# Patient Record
Sex: FEMALE | Race: WHITE
[De-identification: names, ages, dates, MRNs, and addresses within clinical notes are randomized per-mention and may not be internally consistent; named-entity substitution may affect disease eponyms.]

---

## 2017-01-17 ENCOUNTER — RX ONLY (RX ONLY)
Age: 52
End: 2017-01-17

## 2017-01-17 ENCOUNTER — DOCTOR'S OFFICE (OUTPATIENT)
Dept: URBAN - METROPOLITAN AREA CLINIC 136 | Facility: CLINIC | Age: 52
Setting detail: OPHTHALMOLOGY
End: 2017-01-17
Payer: COMMERCIAL

## 2017-01-17 DIAGNOSIS — H52.4: ICD-10-CM

## 2017-01-17 PROCEDURE — 92014 COMPRE OPH EXAM EST PT 1/>: CPT | Performed by: OPTOMETRIST

## 2017-01-17 ASSESSMENT — REFRACTION_OUTSIDERX
OS_CYLINDER: -0.50
OD_VA3: 20/
OS_SPHERE: PLANO
OD_SPHERE: +2.00
OD_CYLINDER: -0.50
OS_SPHERE: +2.00
OD_VA1: 20/20
OS_VA2: 20/
OS_VA3: PRA: -0.50
OD_AXIS: 180
OD_SPHERE: PLANO
OD_VA2: 20/
OD_VA2: 20/20 OU
OD_VA3: NRA: +0.50
OD_AXIS: 180
OU_VA: 20/20
OS_VA1: 20/
OS_VA1: 20/20
OU_VA: 20/
OD_VA1: 20/
OS_CYLINDER: -0.50
OD_CYLINDER: -0.50
OS_AXIS: 160
OS_ADD: +2.00
OD_ADD: +2.00
OS_VA3: 20/
OS_AXIS: 160
OS_VA2: 20/

## 2017-01-17 ASSESSMENT — AXIALLENGTH_DERIVED
OS_AL: 23.37
OD_AL: 23.1885

## 2017-01-17 ASSESSMENT — VISUAL ACUITY
OS_BCVA: 20/20-1
OD_BCVA: 20/20

## 2017-01-17 ASSESSMENT — REFRACTION_CURRENTRX
OS_OVR_VA: 20/
OS_CYLINDER: SPHERE
OS_OVR_VA: 20/
OD_OVR_VA: 20/
OD_OVR_VA: 20/
OD_VPRISM_DIRECTION: SV
OS_VPRISM_DIRECTION: SV
OD_SPHERE: +1.75
OS_SPHERE: +1.75
OD_CYLINDER: SPHERE
OS_OVR_VA: 20/
OD_OVR_VA: 20/

## 2017-01-17 ASSESSMENT — REFRACTION_MANIFEST
OD_VA2: 20/
OS_VA1: 20/
OS_VA3: 20/
OS_VA2: 20/
OU_VA: 20/
OD_VA3: 20/
OD_VA1: 20/

## 2017-01-17 ASSESSMENT — KERATOMETRY
METHOD_AUTO_MANUAL: AUTO
OD_K2POWER_DIOPTERS: 45.50
OS_K2POWER_DIOPTERS: 45.00
OD_K1POWER_DIOPTERS: 44.00
OS_K1POWER_DIOPTERS: 43.75

## 2017-01-17 ASSESSMENT — CONFRONTATIONAL VISUAL FIELD TEST (CVF)
OS_FINDINGS: FULL
OD_FINDINGS: FULL

## 2017-01-17 ASSESSMENT — TEAR BREAK UP TIME (TBUT)
OS_TBUT: 4S
OD_TBUT: 5S

## 2017-01-17 ASSESSMENT — SPHEQUIV_DERIVED
OS_SPHEQUIV: -0.25
OD_SPHEQUIV: -0.125

## 2017-01-17 ASSESSMENT — DECREASING TEAR LAKE - SEVERITY SCORE
OS_DEC_TEARLAKE: T
OD_DEC_TEARLAKE: T

## 2017-01-17 ASSESSMENT — SUPERFICIAL PUNCTATE KERATITIS (SPK)
OD_SPK: ABSENT
OS_SPK: ABSENT

## 2017-01-17 ASSESSMENT — REFRACTION_AUTOREFRACTION
OD_AXIS: 003
OS_CYLINDER: -1.00
OD_SPHERE: +0.25
OS_SPHERE: +0.25
OD_CYLINDER: -0.75
OS_AXIS: 164

## 2017-03-29 ENCOUNTER — GENERIC CONVERSION - ENCOUNTER (OUTPATIENT)
Dept: OTHER | Facility: OTHER | Age: 52
End: 2017-03-29

## 2017-04-03 ENCOUNTER — RX ONLY (RX ONLY)
Age: 52
End: 2017-04-03

## 2017-04-03 ENCOUNTER — DOCTOR'S OFFICE (OUTPATIENT)
Dept: URBAN - METROPOLITAN AREA CLINIC 136 | Facility: CLINIC | Age: 52
Setting detail: OPHTHALMOLOGY
End: 2017-04-03
Payer: COMMERCIAL

## 2017-04-03 DIAGNOSIS — H40.033: ICD-10-CM

## 2017-04-03 DIAGNOSIS — H04.123: ICD-10-CM

## 2017-04-03 DIAGNOSIS — H10.503: ICD-10-CM

## 2017-04-03 DIAGNOSIS — H52.4: ICD-10-CM

## 2017-04-03 PROCEDURE — 92014 COMPRE OPH EXAM EST PT 1/>: CPT | Performed by: OPHTHALMOLOGY

## 2017-04-03 PROCEDURE — 92133 CPTRZD OPH DX IMG PST SGM ON: CPT | Performed by: OPHTHALMOLOGY

## 2017-04-03 ASSESSMENT — REFRACTION_CURRENTRX
OS_OVR_VA: 20/
OD_VPRISM_DIRECTION: SV
OD_CYLINDER: SPHERE
OD_OVR_VA: 20/
OD_SPHERE: +1.75
OD_OVR_VA: 20/
OS_SPHERE: +1.75
OS_CYLINDER: SPHERE
OS_VPRISM_DIRECTION: SV
OS_OVR_VA: 20/
OS_OVR_VA: 20/
OD_OVR_VA: 20/

## 2017-04-03 ASSESSMENT — LID EXAM ASSESSMENTS
OD_BLEPHARITIS: 1+
OS_BLEPHARITIS: 1+

## 2017-04-03 ASSESSMENT — REFRACTION_OUTSIDERX
OS_CYLINDER: -0.50
OS_CYLINDER: -0.50
OU_VA: 20/20
OS_ADD: +2.00
OD_CYLINDER: -0.50
OD_AXIS: 180
OD_VA1: 20/20
OD_ADD: +2.00
OS_VA2: 20/
OS_AXIS: 160
OS_VA1: 20/20
OD_AXIS: 180
OD_CYLINDER: -0.50
OS_VA3: 20/
OS_VA1: 20/
OS_VA2: 20/
OS_SPHERE: +2.00
OD_VA2: 20/
OD_VA1: 20/
OD_SPHERE: PLANO
OD_VA3: NRA: +0.50
OD_SPHERE: +2.00
OU_VA: 20/
OD_VA2: 20/20 OU
OS_VA3: PRA: -0.50
OD_VA3: 20/
OS_SPHERE: PLANO
OS_AXIS: 160

## 2017-04-03 ASSESSMENT — TEAR BREAK UP TIME (TBUT)
OD_TBUT: 5S
OS_TBUT: 4S

## 2017-04-03 ASSESSMENT — VISUAL ACUITY
OS_BCVA: 20/20
OD_BCVA: 20/20

## 2017-04-03 ASSESSMENT — DECREASING TEAR LAKE - SEVERITY SCORE
OS_DEC_TEARLAKE: T
OD_DEC_TEARLAKE: T

## 2017-04-03 ASSESSMENT — REFRACTION_MANIFEST
OU_VA: 20/
OS_VA2: 20/
OS_VA3: 20/
OD_VA3: 20/
OD_VA2: 20/
OD_VA1: 20/
OS_VA1: 20/

## 2017-04-03 ASSESSMENT — SPHEQUIV_DERIVED
OD_SPHEQUIV: -0.125
OS_SPHEQUIV: -0.25

## 2017-04-03 ASSESSMENT — CONFRONTATIONAL VISUAL FIELD TEST (CVF)
OS_FINDINGS: FULL
OD_FINDINGS: FULL

## 2017-04-03 ASSESSMENT — SUPERFICIAL PUNCTATE KERATITIS (SPK)
OD_SPK: ABSENT
OS_SPK: ABSENT

## 2017-04-03 ASSESSMENT — REFRACTION_AUTOREFRACTION
OS_CYLINDER: -1.00
OD_CYLINDER: -0.75
OS_AXIS: 164
OS_SPHERE: +0.25
OD_SPHERE: +0.25
OD_AXIS: 003

## 2017-04-26 ENCOUNTER — AMBUL SURGICAL CARE (OUTPATIENT)
Dept: URBAN - METROPOLITAN AREA SURGERY 32 | Facility: SURGERY | Age: 52
Setting detail: OPHTHALMOLOGY
End: 2017-04-26
Payer: COMMERCIAL

## 2017-04-26 DIAGNOSIS — H40.031: ICD-10-CM

## 2017-04-26 PROCEDURE — 66761 REVISION OF IRIS: CPT | Performed by: OPHTHALMOLOGY

## 2017-04-26 PROCEDURE — G8918 PT W/O PREOP ORDER IV AB PRO: HCPCS | Performed by: OPHTHALMOLOGY

## 2017-04-26 PROCEDURE — G8907 PT DOC NO EVENTS ON DISCHARG: HCPCS | Performed by: OPHTHALMOLOGY

## 2017-04-26 ASSESSMENT — REFRACTION_OUTSIDERX
OS_VA2: 20/
OD_ADD: +2.00
OS_SPHERE: +2.00
OS_AXIS: 160
OD_VA2: 20/20 OU
OD_SPHERE: +2.00
OS_VA3: 20/
OS_VA3: PRA: -0.50
OD_VA2: 20/
OS_VA1: 20/
OS_ADD: +2.00
OD_CYLINDER: -0.50
OD_VA3: NRA: +0.50
OS_VA2: 20/
OS_SPHERE: PLANO
OS_VA1: 20/20
OU_VA: 20/
OS_CYLINDER: -0.50
OS_CYLINDER: -0.50
OD_VA3: 20/
OD_AXIS: 180
OD_SPHERE: PLANO
OU_VA: 20/20
OD_VA1: 20/
OS_AXIS: 160
OD_VA1: 20/20
OD_AXIS: 180
OD_CYLINDER: -0.50

## 2017-04-26 ASSESSMENT — REFRACTION_MANIFEST
OD_VA3: 20/
OU_VA: 20/
OS_VA3: 20/
OD_VA2: 20/
OS_VA2: 20/
OD_VA1: 20/
OS_VA1: 20/

## 2017-04-26 ASSESSMENT — REFRACTION_AUTOREFRACTION
OS_CYLINDER: -1.00
OD_SPHERE: +0.25
OS_AXIS: 164
OD_CYLINDER: -0.75
OS_SPHERE: +0.25
OD_AXIS: 003

## 2017-04-26 ASSESSMENT — REFRACTION_CURRENTRX
OD_VPRISM_DIRECTION: SV
OD_SPHERE: +1.75
OD_OVR_VA: 20/
OD_CYLINDER: SPHERE
OS_OVR_VA: 20/
OD_OVR_VA: 20/
OS_CYLINDER: SPHERE
OS_SPHERE: +1.75
OS_OVR_VA: 20/
OS_OVR_VA: 20/
OS_VPRISM_DIRECTION: SV
OD_OVR_VA: 20/

## 2017-04-26 ASSESSMENT — SUPERFICIAL PUNCTATE KERATITIS (SPK)
OD_SPK: ABSENT
OS_SPK: ABSENT

## 2017-04-26 ASSESSMENT — DECREASING TEAR LAKE - SEVERITY SCORE
OD_DEC_TEARLAKE: T
OS_DEC_TEARLAKE: T

## 2017-04-26 ASSESSMENT — TEAR BREAK UP TIME (TBUT)
OD_TBUT: 5S
OS_TBUT: 4S

## 2017-04-26 ASSESSMENT — VISUAL ACUITY
OS_BCVA: 20/20
OD_BCVA: 20/20

## 2017-04-26 ASSESSMENT — SPHEQUIV_DERIVED
OD_SPHEQUIV: -0.125
OS_SPHEQUIV: -0.25

## 2017-05-10 ENCOUNTER — AMBUL SURGICAL CARE (OUTPATIENT)
Dept: URBAN - METROPOLITAN AREA SURGERY 32 | Facility: SURGERY | Age: 52
Setting detail: OPHTHALMOLOGY
End: 2017-05-10
Payer: COMMERCIAL

## 2017-05-10 DIAGNOSIS — H40.032: ICD-10-CM

## 2017-05-10 PROCEDURE — G8918 PT W/O PREOP ORDER IV AB PRO: HCPCS | Performed by: OPHTHALMOLOGY

## 2017-05-10 PROCEDURE — 66761 REVISION OF IRIS: CPT | Performed by: OPHTHALMOLOGY

## 2017-05-10 PROCEDURE — G8907 PT DOC NO EVENTS ON DISCHARG: HCPCS | Performed by: OPHTHALMOLOGY

## 2017-05-10 ASSESSMENT — REFRACTION_OUTSIDERX
OD_VA1: 20/20
OS_SPHERE: +2.00
OS_VA2: 20/
OS_VA2: 20/
OS_VA1: 20/
OD_VA2: 20/
OS_CYLINDER: -0.50
OS_VA3: 20/
OD_VA3: 20/
OD_SPHERE: +2.00
OU_VA: 20/
OS_VA3: PRA: -0.50
OS_AXIS: 160
OS_SPHERE: PLANO
OS_ADD: +2.00
OD_ADD: +2.00
OD_AXIS: 180
OU_VA: 20/20
OD_CYLINDER: -0.50
OD_SPHERE: PLANO
OS_CYLINDER: -0.50
OD_VA2: 20/20 OU
OD_CYLINDER: -0.50
OD_VA3: NRA: +0.50
OD_AXIS: 180
OD_VA1: 20/
OS_VA1: 20/20
OS_AXIS: 160

## 2017-05-10 ASSESSMENT — REFRACTION_MANIFEST
OU_VA: 20/
OD_VA2: 20/
OD_VA1: 20/
OD_VA3: 20/
OS_VA1: 20/
OS_VA2: 20/
OS_VA3: 20/

## 2017-05-10 ASSESSMENT — REFRACTION_AUTOREFRACTION
OS_CYLINDER: -1.00
OS_AXIS: 164
OD_SPHERE: +0.25
OD_AXIS: 003
OS_SPHERE: +0.25
OD_CYLINDER: -0.75

## 2017-05-10 ASSESSMENT — REFRACTION_CURRENTRX
OD_SPHERE: +1.75
OD_OVR_VA: 20/
OD_CYLINDER: SPHERE
OS_SPHERE: +1.75
OS_OVR_VA: 20/
OD_VPRISM_DIRECTION: SV
OS_VPRISM_DIRECTION: SV
OS_OVR_VA: 20/
OS_CYLINDER: SPHERE
OD_OVR_VA: 20/
OS_OVR_VA: 20/
OD_OVR_VA: 20/

## 2017-05-10 ASSESSMENT — VISUAL ACUITY
OS_BCVA: 20/20
OD_BCVA: 20/20

## 2017-05-10 ASSESSMENT — TEAR BREAK UP TIME (TBUT)
OD_TBUT: 5S
OS_TBUT: 4S

## 2017-05-10 ASSESSMENT — DECREASING TEAR LAKE - SEVERITY SCORE
OS_DEC_TEARLAKE: T
OD_DEC_TEARLAKE: T

## 2017-05-10 ASSESSMENT — SPHEQUIV_DERIVED
OD_SPHEQUIV: -0.125
OS_SPHEQUIV: -0.25

## 2017-05-10 ASSESSMENT — SUPERFICIAL PUNCTATE KERATITIS (SPK)
OD_SPK: ABSENT
OS_SPK: ABSENT

## 2017-12-12 ENCOUNTER — ALLSCRIPTS OFFICE VISIT (OUTPATIENT)
Dept: OTHER | Facility: OTHER | Age: 52
End: 2017-12-12

## 2017-12-13 ENCOUNTER — TRANSCRIBE ORDERS (OUTPATIENT)
Dept: SLEEP CENTER | Facility: CLINIC | Age: 52
End: 2017-12-13

## 2017-12-13 DIAGNOSIS — E66.01 MORBID OBESITY (HCC): Primary | ICD-10-CM

## 2017-12-14 DIAGNOSIS — E66.01 MORBID (SEVERE) OBESITY DUE TO EXCESS CALORIES (HCC): ICD-10-CM

## 2017-12-14 DIAGNOSIS — J45.909 UNCOMPLICATED ASTHMA: ICD-10-CM

## 2017-12-14 DIAGNOSIS — R63.5 ABNORMAL WEIGHT GAIN: ICD-10-CM

## 2017-12-14 DIAGNOSIS — E78.2 MIXED HYPERLIPIDEMIA: ICD-10-CM

## 2017-12-14 DIAGNOSIS — R73.03 PREDIABETES: ICD-10-CM

## 2017-12-14 DIAGNOSIS — K21.9 GASTRO-ESOPHAGEAL REFLUX DISEASE WITHOUT ESOPHAGITIS: ICD-10-CM

## 2017-12-15 ENCOUNTER — GENERIC CONVERSION - ENCOUNTER (OUTPATIENT)
Dept: OTHER | Facility: OTHER | Age: 52
End: 2017-12-15

## 2018-01-03 ENCOUNTER — GENERIC CONVERSION - ENCOUNTER (OUTPATIENT)
Dept: OTHER | Facility: OTHER | Age: 53
End: 2018-01-03

## 2018-01-17 ENCOUNTER — DOCTOR'S OFFICE (OUTPATIENT)
Dept: URBAN - METROPOLITAN AREA CLINIC 136 | Facility: CLINIC | Age: 53
Setting detail: OPHTHALMOLOGY
End: 2018-01-17
Payer: COMMERCIAL

## 2018-01-17 DIAGNOSIS — H52.4: ICD-10-CM

## 2018-01-17 PROCEDURE — 92014 COMPRE OPH EXAM EST PT 1/>: CPT | Performed by: OPTOMETRIST

## 2018-01-17 ASSESSMENT — REFRACTION_MANIFEST
OD_VA1: 20/
OS_VA2: 20/
OS_VA1: 20/
OS_VA3: 20/
OD_VA2: 20/
OU_VA: 20/
OD_VA3: 20/

## 2018-01-17 ASSESSMENT — CONFRONTATIONAL VISUAL FIELD TEST (CVF)
OD_FINDINGS: FULL
OS_FINDINGS: FULL

## 2018-01-17 ASSESSMENT — REFRACTION_AUTOREFRACTION
OS_CYLINDER: -1.00
OS_SPHERE: +0.25
OS_AXIS: 165
OD_CYLINDER: -0.75
OD_AXIS: 012
OD_SPHERE: PLANO

## 2018-01-17 ASSESSMENT — REFRACTION_OUTSIDERX
OD_SPHERE: +2.00
OD_ADD: +2.25
OD_CYLINDER: -0.50
OD_VA2: 20/20 OU
OS_VA3: 20/
OS_SPHERE: +2.25
OS_CYLINDER: -0.75
OS_AXIS: 160
OS_VA1: 20/20
OS_SPHERE: +0.25
OS_AXIS: 160
OD_VA1: 20/
OD_CYLINDER: -0.50
OS_VA2: 20/
OD_VA3: NRA: +0.50
OS_VA3: PRA: -0.50
OS_ADD: +2.25
OS_VA2: 20/
OS_CYLINDER: -0.75
OD_VA2: 20/
OD_AXIS: 010
OU_VA: 20/
OS_VA1: 20/
OD_AXIS: 010
OD_SPHERE: PLANO
OU_VA: 20/20
OD_VA3: 20/
OD_VA1: 20/20

## 2018-01-17 ASSESSMENT — SUPERFICIAL PUNCTATE KERATITIS (SPK)
OD_SPK: ABSENT
OS_SPK: ABSENT

## 2018-01-17 ASSESSMENT — KERATOMETRY
OS_K1POWER_DIOPTERS: 43.75
METHOD_AUTO_MANUAL: AUTO
OS_K2POWER_DIOPTERS: 45.00
OD_K2POWER_DIOPTERS: 45.50
OD_K1POWER_DIOPTERS: 44.00

## 2018-01-17 ASSESSMENT — REFRACTION_CURRENTRX
OS_VPRISM_DIRECTION: SV
OD_SPHERE: +1.75
OD_OVR_VA: 20/
OD_VPRISM_DIRECTION: SV
OS_OVR_VA: 20/
OS_OVR_VA: 20/
OS_SPHERE: +1.75
OD_OVR_VA: 20/
OS_CYLINDER: SPHERE
OD_CYLINDER: SPHERE
OD_OVR_VA: 20/
OS_OVR_VA: 20/

## 2018-01-17 ASSESSMENT — SPHEQUIV_DERIVED: OS_SPHEQUIV: -0.25

## 2018-01-17 ASSESSMENT — DECREASING TEAR LAKE - SEVERITY SCORE
OS_DEC_TEARLAKE: T
OD_DEC_TEARLAKE: T

## 2018-01-17 ASSESSMENT — LID EXAM ASSESSMENTS
OD_BLEPHARITIS: 1+
OS_BLEPHARITIS: 1+

## 2018-01-17 ASSESSMENT — AXIALLENGTH_DERIVED: OS_AL: 23.37

## 2018-01-17 ASSESSMENT — TEAR BREAK UP TIME (TBUT)
OS_TBUT: 4S
OD_TBUT: 5S

## 2018-01-17 ASSESSMENT — VISUAL ACUITY
OD_BCVA: 20/25+2
OS_BCVA: 20/20-1

## 2018-01-23 VITALS
TEMPERATURE: 96.8 F | SYSTOLIC BLOOD PRESSURE: 116 MMHG | DIASTOLIC BLOOD PRESSURE: 84 MMHG | BODY MASS INDEX: 37.19 KG/M2 | RESPIRATION RATE: 16 BRPM | WEIGHT: 202.1 LBS | HEIGHT: 62 IN | HEART RATE: 92 BPM

## 2018-01-23 NOTE — RESULT NOTES
Message  Reviewed notes from GI  Patient had endoscopy in October of 2016 which was positive for H pylori  Stool for H pylori negative in March of 2017  She does have a history of Banks's esophagus without dysplasia  And his CT scan of the abdomen did show fatty liver

## 2018-01-23 NOTE — MISCELLANEOUS
Message  Spoke with the patient  She saw her PCP and had labs done yesterday  She was diagnosed with diabetes, hyperlipidemia, and hypertension and was started on medication for all three  She requested that the lab results be copy to us and will request the last office note be sent to our office  Past sent to insurance coordinators see if she has a candidate for bariatric surgery        Signatures   Electronically signed by : LUZ Hdez; Hernan  3 2018  3:51PM EST                       (Author)

## 2018-01-23 NOTE — MISCELLANEOUS
Message  Reviewed labs from March  A1c was 6 4%  We will recheck with a CMP and TSH  Her cholesterol was also elevated  She will follow up with her PCP for further evaluation and management  She is currently interested in bariatric surgery, however with out primary diagnosis she does not qualify  For now she will schedule an appointment for menu planning with the dietitian and follow in the conservative program  She was referred for sleep medicine consult as well  Plan  Abnormal weight gain, Hyperlipidemia, mixed, Prediabetes    · (1) TSH WITH FT4 REFLEX; Status:Active; Requested for:[id number];   Acid reflux, Asthma, Hyperlipidemia, mixed, Prediabetes, Severe obesity (BMI 35 0-39  9)    · (1) COMPREHENSIVE METABOLIC PANEL; Status:Active; Requested for:[id number];    · (1) HEMOGLOBIN A1C; Status:Active;  Requested for:[id number];     Signatures   Electronically signed by : Kim Leal, [facility]; Dec 14 2017  9:56AM EST                       (Author)

## 2018-01-23 NOTE — CONSULTS
Chief Complaint  Chief Complaint Free Text Note Form: New patient here for MWM consult; Stop [place] Garber   History of Present Illness  Free Text HPI: Obesity-  Severity: Severe  Onset: 15 years ago  Modifiers: follows a healthy diet  frequent steroid use due to asthma  Tried [employer], [employer], [place] [employer], paleo diet, and vegan diet and was unable to lose weight  Associated Symptoms: worsening asthma, GERD, fatigue  Goals: become more active, improve comorbid conditions, feel better/more energized, weigh 135 lbs    Drinks 8-9 glasses of water  Drinks 2-3 cups of black coffee  B: two eggs + 1 piece whole grain bread  L: salad and small chili  S: occasional fruit  D: protein + salad +starch  S: fruit  Past Medical History    1  No pertinent past medical history  Active Problems And Past Medical History Reviewed: The active problems and past medical history were reviewed and updated today  Assessment    1  Severe obesity (BMI 35 0-39 9) (278 01) (E66 01)   2  Asthma (493 90) (J45 909)   3  Acid reflux (530 81) (K21 9)   4  Prediabetes (790 29) (R73 03)    Plan   1  *1 -  [facility] Co-Management  *+STOPBAN/8  Status: Hold For -   Scheduling  Requested for: [id number]  Care Summary provided  : Yes    Discussion/Summary  Discussion Summary:   46 yo female w/pre diabetes, asthma, GERD, +STOP BANG, and severe obesity here to pursue medical weight management to improve weight and health  Obesity class 2:  -discussed options of conservative vs ANA MARIA program +/- meal replacement vs VLCD  -discussed the role of weight loss medication  -initial focus of 5-10% weight loss over 3-6 mos for improved health  -screening labs:  Will request from patient's PCP    Prediabetes:  -will request lab results from PCP    Asthma: Severe  -on daily prednisone, singular, and Ventolin  -continue management with pulmonary    GERD:  -?Banks's: will request notes studies from GI  -not well controlled  -taking omeprazole    Osteopenia:  -referred to rheumatology by PCP    +STOPBANG:  -referral to sleep medicine provided    Per patient, her PCP and specialist have recommended surgery  Reviewed indications for surgery  For now, she is interested in Healthy Core or VLCD  She will review her options and call to schedule  Goals and Barriers: The patient has the current Goals:     Goals:  1  Food log www  [employer] <[email or web address]>  Patient's Capacity to Self-Care: Patient is able to Self-Care  Understands and agrees with treatment plan: The treatment plan was reviewed with the patient/guardian  The patient/guardian understands and agrees with the treatment plan   Self Referrals:   Self Referrals: No      Review of Systems  Focused-Female:   Constitutional: no fever and no chills  ENT: no sore throat  Cardiovascular: no palpitations  Respiratory: shortness of breath, cough and Managed by Pulmonary  Gastrointestinal: GERD poorly controlled, but no abdominal pain, no nausea, no vomiting and no diarrhea  Genitourinary: no dysuria  Musculoskeletal: no arthralgias  Integumentary: no rashes  The patient presents with complaints of headache (managed by PCP)  Other Symptoms: PSYCH: denies depression and anxiety  ROS Reviewed:   ROS reviewed  Surgical History    1  Denied: History of Recent Surgery  Surgical History Reviewed: The surgical history was reviewed and updated today  Family History  Mother    1  Family history of diabetes mellitus (V18 0) (Z83 3)   2  Family history of hypothyroidism (V18 19) (Z83 49)  Family History Reviewed: The family history was reviewed and updated today  Social History    · Never a smoker   · No alcohol use  Social History Reviewed: The social history was reviewed and updated today  Current Meds  Medication List Reviewed: The medication list was reviewed and updated today        Vitals  Vital Signs    Recorded: [id number] 03:16PM Temperature 96 8 F   Heart Rate 92   Respiration 16   Systolic 583   Diastolic 84   Height 5 ft 2 in   Weight 202 lb 1 6 oz   BMI Calculated 36 96   BSA Calculated 1 92   Waist Circumference 49 75   Neck Circumference 14 5     Physical Exam    Constitutional   General appearance: Abnormal   well developed and obese  Eyes No conjunctival pallor  Ears, Nose, Mouth, and Throat Oral mucosa moist    Pulmonary   Respiratory effort: Abnormal   (Appears short of breath) Respiratory rate: normal    Auscultation of lungs: Abnormal   Auscultation of the lungs revealed decreased breath sounds diffusely, diffuse wheezing, expiratory wheezing and inspiratory wheezing  Cardiovascular   Auscultation of heart: Normal rate and rhythm, normal S1 and S2, without murmurs  Examination of extremities for edema and/or varicosities: Normal   no edema  Abdomen   Abdomen: Abnormal   The abdomen was obese  Bowel sounds were normal  The abdomen was soft and nontender     Musculoskeletal   Gait and station: Normal     Psychiatric   Orientation to person, place, and time: Normal          Results/Data  STOP BANG Questionnaire [id number] 03:28PM User, Ahs     Test Name Result Flag Reference   STOP BANG Questionnaire Risk of ART High Risk     Snoring: Yes  Tired: Yes  Observed: Yes  Blood Pressure: Yes  BMI: Yes  Age: Yes  Neck Circumference: No  Gender: No   STOP BANG Questionnaire ART Total Score 6     Snoring: Yes  Tired: Yes  Observed: Yes  Blood Pressure: Yes  BMI: Yes  Age: Yes  Neck Circumference: No  Gender: No       Signatures   Electronically signed by : LUZ Stanford; Dec 12 2017  4:03PM EST                       (Author)    Electronically signed by : SIDNEY Graves ; Dec 13 2017  9:11AM EST                       (Co-author)

## 2018-03-06 ENCOUNTER — RX ONLY (RX ONLY)
Age: 53
End: 2018-03-06

## 2018-03-06 ENCOUNTER — DOCTOR'S OFFICE (OUTPATIENT)
Dept: URBAN - METROPOLITAN AREA CLINIC 136 | Facility: CLINIC | Age: 53
Setting detail: OPHTHALMOLOGY
End: 2018-03-06
Payer: COMMERCIAL

## 2018-03-06 DIAGNOSIS — H04.123: ICD-10-CM

## 2018-03-06 PROCEDURE — 99213 OFFICE O/P EST LOW 20 MIN: CPT | Performed by: OPTOMETRIST

## 2018-03-06 PROCEDURE — 83861 MICROFLUID ANALY TEARS: CPT | Performed by: OPTOMETRIST

## 2018-03-06 ASSESSMENT — REFRACTION_OUTSIDERX
OD_VA3: NRA: +0.50
OD_ADD: +2.25
OD_VA2: 20/
OS_CYLINDER: -0.75
OD_VA1: 20/
OS_ADD: +2.25
OD_CYLINDER: -0.50
OD_VA2: 20/20 OU
OD_CYLINDER: -0.50
OD_AXIS: 010
OS_SPHERE: +2.25
OD_SPHERE: +2.00
OS_CYLINDER: -0.75
OU_VA: 20/20
OD_AXIS: 010
OD_SPHERE: PLANO
OD_VA1: 20/20
OS_VA3: 20/
OS_VA3: PRA: -0.50
OS_AXIS: 160
OS_VA1: 20/20
OS_AXIS: 160
OS_VA1: 20/
OS_SPHERE: +0.25
OS_VA2: 20/
OD_VA3: 20/
OU_VA: 20/
OS_VA2: 20/

## 2018-03-06 ASSESSMENT — REFRACTION_AUTOREFRACTION
OS_CYLINDER: -0.75
OD_SPHERE: PLANO
OS_AXIS: 162
OS_SPHERE: PLANO
OD_AXIS: 017
OD_CYLINDER: -0.50

## 2018-03-06 ASSESSMENT — DECREASING TEAR LAKE - SEVERITY SCORE
OS_DEC_TEARLAKE: T
OD_DEC_TEARLAKE: T

## 2018-03-06 ASSESSMENT — SUPERFICIAL PUNCTATE KERATITIS (SPK)
OS_SPK: ABSENT
OD_SPK: ABSENT

## 2018-03-06 ASSESSMENT — REFRACTION_MANIFEST
OS_VA1: 20/
OD_VA3: 20/
OD_VA1: 20/
OS_VA3: 20/
OD_VA2: 20/
OU_VA: 20/
OS_VA2: 20/

## 2018-03-06 ASSESSMENT — CONFRONTATIONAL VISUAL FIELD TEST (CVF)
OS_FINDINGS: FULL
OD_FINDINGS: FULL

## 2018-03-06 ASSESSMENT — LID EXAM ASSESSMENTS
OD_BLEPHARITIS: 1+
OS_BLEPHARITIS: 1+

## 2018-03-06 ASSESSMENT — REFRACTION_CURRENTRX
OS_CYLINDER: SPHERE
OD_OVR_VA: 20/
OS_OVR_VA: 20/
OD_OVR_VA: 20/
OD_VPRISM_DIRECTION: SV
OD_CYLINDER: SPHERE
OS_SPHERE: +1.75
OD_SPHERE: +1.75
OD_OVR_VA: 20/
OS_OVR_VA: 20/
OS_OVR_VA: 20/
OS_VPRISM_DIRECTION: SV

## 2018-03-06 ASSESSMENT — KERATOMETRY
OS_K2POWER_DIOPTERS: 45.00
OS_K1POWER_DIOPTERS: 43.75
OD_K2POWER_DIOPTERS: 45.50
OD_K1POWER_DIOPTERS: 44.00
METHOD_AUTO_MANUAL: AUTO

## 2018-03-06 ASSESSMENT — VISUAL ACUITY
OD_BCVA: 20/25
OS_BCVA: 20/20

## 2018-03-06 ASSESSMENT — TEAR BREAK UP TIME (TBUT)
OD_TBUT: 3+ 2 SEC
OS_TBUT: 3+ 2 SEC

## 2018-06-29 ENCOUNTER — DOCTOR'S OFFICE (OUTPATIENT)
Dept: URBAN - METROPOLITAN AREA CLINIC 136 | Facility: CLINIC | Age: 53
Setting detail: OPHTHALMOLOGY
End: 2018-06-29
Payer: COMMERCIAL

## 2018-06-29 DIAGNOSIS — H04.123: ICD-10-CM

## 2018-06-29 PROBLEM — H40.033 NARROW ANGLE; BOTH EYES: Status: ACTIVE | Noted: 2017-04-03

## 2018-06-29 PROBLEM — H52.4 PRESBYOPIA: Status: ACTIVE | Noted: 2017-04-03

## 2018-06-29 PROBLEM — H10.503 BLEPHAROCONJUNCTIVITS NOS; BOTH EYES: Status: ACTIVE | Noted: 2017-04-03

## 2018-06-29 PROCEDURE — 99213 OFFICE O/P EST LOW 20 MIN: CPT | Performed by: OPTOMETRIST

## 2018-06-29 ASSESSMENT — REFRACTION_MANIFEST
OS_VA3: 20/
OD_VA1: 20/
OD_VA2: 20/
OD_VA3: 20/
OS_VA1: 20/
OS_VA2: 20/
OU_VA: 20/

## 2018-06-29 ASSESSMENT — REFRACTION_OUTSIDERX
OD_AXIS: 010
OD_VA3: NRA: +0.50
OD_CYLINDER: -0.50
OS_VA2: 20/
OD_VA1: 20/20
OS_VA2: 20/
OD_VA2: 20/
OD_SPHERE: +2.00
OD_SPHERE: PLANO
OS_VA1: 20/20
OD_ADD: +2.25
OD_VA3: 20/
OS_VA3: 20/
OS_CYLINDER: -0.75
OS_VA1: 20/
OD_CYLINDER: -0.50
OS_VA3: PRA: -0.50
OS_ADD: +2.25
OS_AXIS: 160
OS_AXIS: 160
OD_AXIS: 010
OD_VA1: 20/
OS_CYLINDER: -0.75
OU_VA: 20/20
OS_SPHERE: +2.25
OD_VA2: 20/20 OU
OS_SPHERE: +0.25
OU_VA: 20/

## 2018-06-29 ASSESSMENT — REFRACTION_AUTOREFRACTION
OD_SPHERE: PLANO
OD_AXIS: 017
OS_SPHERE: PLANO
OS_AXIS: 162
OD_CYLINDER: -0.50
OS_CYLINDER: -0.75

## 2018-06-29 ASSESSMENT — DECREASING TEAR LAKE - SEVERITY SCORE
OD_DEC_TEARLAKE: T
OS_DEC_TEARLAKE: T

## 2018-06-29 ASSESSMENT — REFRACTION_CURRENTRX
OD_SPHERE: +1.75
OS_VPRISM_DIRECTION: SV
OS_CYLINDER: SPHERE
OD_CYLINDER: SPHERE
OD_VPRISM_DIRECTION: SV
OS_SPHERE: +1.75
OD_OVR_VA: 20/
OS_OVR_VA: 20/

## 2018-06-29 ASSESSMENT — TEAR BREAK UP TIME (TBUT)
OS_TBUT: 3+ 2 SEC
OD_TBUT: 3+ 2 SEC

## 2018-06-29 ASSESSMENT — SUPERFICIAL PUNCTATE KERATITIS (SPK)
OD_SPK: ABSENT
OS_SPK: ABSENT

## 2018-06-29 ASSESSMENT — CONFRONTATIONAL VISUAL FIELD TEST (CVF)
OD_FINDINGS: FULL
OS_FINDINGS: FULL

## 2018-06-29 ASSESSMENT — LID EXAM ASSESSMENTS
OD_BLEPHARITIS: 1+
OS_BLEPHARITIS: 1+

## 2018-06-29 ASSESSMENT — VISUAL ACUITY
OS_BCVA: 20/20
OD_BCVA: 20/20-1

## 2019-08-09 ENCOUNTER — TELEPHONE (OUTPATIENT)
Dept: FAMILY MEDICINE CLINIC | Facility: CLINIC | Age: 54
End: 2019-08-09

## 2019-08-09 PROBLEM — J32.4 CHRONIC PANSINUSITIS: Status: ACTIVE | Noted: 2019-08-09

## 2019-08-09 PROBLEM — Z01.818 PREOPERATIVE TESTING: Status: ACTIVE | Noted: 2019-08-09

## 2019-08-09 PROBLEM — R51.9 SEVERE FRONTAL HEADACHES: Status: ACTIVE | Noted: 2019-08-09

## 2019-08-09 PROBLEM — Z01.812 PRE-OPERATIVE LABORATORY EXAMINATION: Status: ACTIVE | Noted: 2019-08-09

## 2019-08-09 NOTE — TELEPHONE ENCOUNTER
[facility] from Dr Maddi Hall office called  Patient is coming in to see you to est care  She was a former pt of [facility]  [facility] would like to know if you would be willing to also do a pre op clearance on her that day  They wanted to schedule her for surgery on 8/27  Please advise and have MA call [facility] back    [phone]-

## 2019-08-09 NOTE — H&P (VIEW-ONLY)
Assessment/Plan:    Based upon the history given and the current physical findings I have recommended that the patient undergo bilateral image-guided functional endoscopic sinus surgery (FESS)  All risks, benefits, alternatives and complications of the procedure have been reviewed in detail  A risk sheet was signed with the patient  The risks of the surgery include but are not limited to:  Bleeding, infection, need for further surgery or treatment, recurrence of disease, inability to complete surgery (residual disease), injury to the olfactory nerve (inability to smell, phantom smell), injury to the orbit and muscles around the eye (double vision, decreased vision, blindness, orbital abscess), injury to the intracranial contents (CSF leak, brain injury, meningitis, encephalitis, brain abscess), cheek pain or numbness, scar formation  The patient / parent understands and accepts the risks of the surgical procedure  Pre-operative testing has been ordered  Medical clearance is required  Post operative medication and instructions have been given and the medication may be filled prior to the procedure  A post operative appointment has been scheduled  If the patient / parent has any questions prior to the date of the procedure they may call the office and I will be glad to discuss any questions or concerns with them  We discussed the possible role of a DRAF procedure and if this surgery is not successful or I am unable to complete this she will need to have this procedure  In the meantime she will use the Budesonide directly in the nose in the sniff position on the left side - this may help with the headache  The current headache is severe and I will start her on oral Gabapentin - she will also have some narcotic given for after the surgery  Encounter Diagnosis     ICD-10-CM    1  Chronic pansinusitis J32 4    2  Severe frontal headaches R51               Patient ID: Kusum Tapia is a 46 y  o  female  Kirkwood Mcardle has a long history of chronic sinusitis  She underwent endoscopic sinus surgery at [address] [place] [address] approximately age 44 with Dr Nabila Stuart  She was doing well up until approximately 5 years ago when she began to develop chronic sinus infections once again  She was developing approximately 8-9 infections per year which would respond to antibiotics but the symptoms would return almost immediately  She has recently been under the care of Dr Paco Washington at [facility] over the past year so and has had multiple endoscopies completed demonstrating polyp disease as well as pus  Cultures have been obtained in the past and were noted in the chart work  Her last exam by Dr Paco Washington was on 03/11/2019 at which point he noted that after a course of oral Bactrim and budesonide rinses there was smaller polyps on the left and the right sides but there was still pus in the right side as well  He recommended a CT scan of the sinuses and stated that if the polyps were only intranasal that she could have an in office polypectomy but if there was any other findings she might need revision endoscopic sinus surgery in the operating room  It appears as though she did not follow up with him after that point - she stopped going there because her asthma worsened and she started to see her allergist (Dr Faye Whitney)  She was recently in urgent care and was given a course of Bactrim for 10 days based on the prior culture  She is not senstive to asprin  She had a CT scan of the sinuses completed on 07/26/2019 which demonstrated polyps in the maxillary sinuses bilaterally with secretions in each of the sinuses well  There is evidence of a partial ethmoidectomy with some thickening in the unopened posterior ethmoids as well as the sphenoids that demonstrated a sphenoidotomy  Her left frontal sinus is completely opacified  Films were available to review today with the patient via disc    Prior to the visit today she was taking Advil sinus, tylenol because of a severe frontal headache over the left eye  It even hurts when she lies down  She has not had a sense of smell in the past three to four months  She has felt better when she was on the steroids  The following portions of the patient's history were reviewed and updated as appropriate: allergies, current medications, past family history, past medical history, past social history, past surgical history and problem list       Past Medical History:   Diagnosis Date    Asthma     Banks's esophagus     Diabetes ([address] )     GERD (gastroesophageal reflux disease)     Hyperlipidemia     Migraine     Nasal polyposis        Past Surgical History:   Procedure Laterality Date    FUNCTIONAL ENDOSCOPIC SINUS SURGERY  2006    [address] - Dr Liz Pires       Social History     Tobacco Use    Smoking status: Never Smoker    Smokeless tobacco: Never Used   Substance Use Topics    Alcohol use:  Yes     Alcohol/week: 1 0 standard drinks     Types: 1 Glasses of wine per week     Frequency: Monthly or less     Comment: No Use per [employer]    Drug use: Never     Comment: No use       Current Outpatient Medications on File Prior to Visit   Medication Sig Dispense Refill    ADVAIR DISKUS 500-50 MCG/DOSE inhaler INL 1 PUFF PO BID  3    albuterol (2 5 mg/3 mL) 0 083 % nebulizer solution TK 3 ML BY NEBULIZATION BID PRN FOR WHEEZING  11    albuterol (ACCUNEB) 1 25 MG/3ML nebulizer solution Inhale      BREO ELLIPTA 200-25 MCG/INH inhaler INL 1 PUFF PO D  6    budesonide (PULMICORT) 0 5 mg/2 mL nebulizer solution TK 2 ML VIA NEBLUIZER BID  6    DEXILANT 60 MG capsule TK 1 C PO 30 MIN B JOSELUIS  5    dupilumab (DUPIXENT) subcutaneous injection Inject under the skin once      EPINEPHrine (EPIPEN) 0 3 mg/0 3 mL SOAJ INJECT AT THE START OF A SEVERE ALLERGY REACTION  3    fexofenadine (ALLEGRA) 180 MG tablet Take 180 mg by mouth daily      fluticasone (FLONASE) 50 mcg/act nasal spray into each nostril      methylprednisolone (MEDROL) 4 mg tablet Take 1 tablet by mouth daily      montelukast (SINGULAIR) 10 mg tablet TK 1 T PO QD  0    SPIRIVA RESPIMAT 2 5 MCG/ACT AERS inhaler INL 2 PFS PO D  5    VENTOLIN  (90 Base) MCG/ACT inhaler INL 2 PFS PO Q 4 H PRF WHZ  5    amoxicillin (AMOXIL) 875 mg tablet TK 1 T PO BID  0    amoxicillin-clavulanate (AUGMENTIN) 875-125 mg per tablet TK 1 T PO IN THE MORNING AND IN THE RUEL WF  0    atorvastatin (LIPITOR) 20 mg tablet   3    clarithromycin (BIAXIN) 250 mg tablet TK 1 T PO QD  5    metFORMIN (GLUCOPHAGE) 500 mg tablet   5    montelukast (SINGULAIR) 10 mg tablet Take by mouth      Omeprazole 20 MG TBDD Take by mouth      predniSONE 10 mg tablet   0    ranitidine (ZANTAC) 150 mg tablet TK 1 T PO HS  6     No current facility-administered medications on file prior to visit  Allergies   Allergen Reactions    Cephalexin GI Intolerance    Clindamycin     Levofloxacin Hives    Metronidazole            Review of Systems   Constitutional: Negative for activity change, appetite change, fatigue, fever and unexpected weight change  HENT: Positive for congestion, postnasal drip, rhinorrhea, sinus pressure and sinus pain  Negative for ear discharge, ear pain, hearing loss, nosebleeds, sneezing, sore throat, tinnitus, trouble swallowing and voice change  Decreased sense of smell   Eyes: Negative  Negative for photophobia, pain, itching and visual disturbance  Respiratory: Negative  Negative for cough, chest tightness and shortness of breath  Cardiovascular: Negative  Negative for chest pain  Gastrointestinal: Negative  Negative for abdominal pain  Endocrine: Negative  Musculoskeletal: Negative  Negative for gait problem, neck pain and neck stiffness  Skin: Negative  Allergic/Immunologic: Negative  Negative for environmental allergies  Neurological: Negative    Negative for dizziness, speech difficulty, light-headedness and headaches  Hematological: Negative  Negative for adenopathy  Psychiatric/Behavioral: Negative  Negative for sleep disturbance  The patient is not nervous/anxious  /72   Pulse 68   Ht 5' 8" (1 727 m)   Wt 87 1 kg (192 lb)   BMI 29 19 kg/m²       PHYSICAL  EXAMINATION    CONSTITUTION:    Appears appropriate for age  No evidence of any acute distress  Communicates normally  Voice quality is clear  Alert and oriented  HEAD/FACE:    Atraumatic, normocephalic on inspection  No scars present  Salivary glands are normal in texture and size without any asymmetry  Facial nerve function is symmetric and normal     EYES:    Extraocular muscles intact in both eyes, normal gaze bilaterally and no evidence of nystagmus  Pupils equal, round, and accommodate to light bilaterally  EARS:    External ears normal     External canals are clear and dry  Tympanic membranes intact with normal mobility, no effusion, no retraction, no perforation  Post auricular area is normal    NOSE:    External nose without deformity  Internal mucosa pink and moist     Septum with a slight deflection to the left side    Inferior nasal turbinates normal in color and size bilaterally    ORAL CAVITY:    Lips normal and healthy in appearance  Dentition normal     Gums healthy, pink and moist     Tongue appears pink and moist with no lesions  Floor of mouth pink, moist, and smooth  Submandibular ducts patent with clear saliva  Parotid ducts patent with clear saliva  Oral mucosa pink and moist     Hard palate normal in appearance without any lesions  OROPHARYNX:    Soft palate pink and moist without any lesions  Uvula midline without any lesions  Tonsils grade 1 bilaterally  Posterior pharynx pink and moist without any lesions    NECK:    Supple and symmetric  No masses noted  Trachea midline      No thyromegaly or nodules noted     LYMPH:    No palpable adenopathy in left or right neck    SKIN:    No rashes  No lesions noted  HEART:   Regular rate and rhythm    LUNGS:  Clear to auscultation bilaterally    Nasal Endoscopy (Post Surgical)    Because only the anterior portion of the turbinates and the anterior septum could be visualized, it was elected to proceed with nasal endoscopy for visualization of the posterior nasal chamber, middle meatal area, sphenoid recess and nasopharynx  Verbal consent was obtained from the patient / parent  The nasal cavity was sprayed with topical phenylephrine:lidocaine (1:1)  The bilateral nasal cavity was sprayed  After waiting an appropriate time for the medication to take effect the nasal cavity was examined using an endoscope with the following findings:    Floor of Nose:  Normally mucosalized/unobstructed    Septum: slightly deviated to the left side    Inferior Turbinate:  Normal mucosa     Middle Turbinate:  Normal mucosa     Middle Meatus and Osteomeatal Unit:  Normal mucosa without mucopus or polyp formation - some thickening of mucosa    Maxillary Sinus:  Patent without any evidence of polyp formation - there is a glob of thick mucous or pus in the right side  Ethmoid Sinus:  Patent without any evidence of mucopus or polyp formation    Frontal Recess / Sinus:  Unable to see into either frontal sinus - large polyp left side blocking area and the right side with some scarring in the area  Sphenoid Os:   No obstruction or discharge from the site    Sphenoid Sinus:  Patent without any evidence of mucopus or polyp formation    Superior Turbinate:  Normal mucosa    Eustachian Tube Orifice:  Normal mucosa without any obstruction or edema    Nasopharynx:  Normal mucosa  No evidence of adenoid tissue, mass, ulcer, mucopus    After careful evaluation of the above structures, the scope was removed from the nasal cavity  The patient tolerated the procedure well

## 2019-08-12 NOTE — TELEPHONE ENCOUNTER
Yes, I would but it might be best to move her apt up in case we come across any issues and have her go for her blood work ordered by ENT before her apt  Apt would need to be updated to include pre op and an EKG done  Thank you

## 2019-08-13 NOTE — TELEPHONE ENCOUNTER
There are limited availability  Keep appt as is and if need be ENT can put off her surgery if something comes up

## 2019-08-20 ENCOUNTER — HOSPITAL ENCOUNTER (OUTPATIENT)
Dept: CT IMAGING | Facility: HOSPITAL | Age: 54
Discharge: HOME | End: 2019-08-20
Payer: COMMERCIAL

## 2019-08-20 DIAGNOSIS — J32.4 CHRONIC PANSINUSITIS: ICD-10-CM

## 2019-08-20 PROCEDURE — 70486 CT MAXILLOFACIAL W/O DYE: CPT

## 2019-08-22 ENCOUNTER — OFFICE VISIT (OUTPATIENT)
Dept: FAMILY MEDICINE CLINIC | Facility: CLINIC | Age: 54
End: 2019-08-22
Payer: COMMERCIAL

## 2019-08-22 VITALS
SYSTOLIC BLOOD PRESSURE: 132 MMHG | HEART RATE: 72 BPM | BODY MASS INDEX: 29.55 KG/M2 | DIASTOLIC BLOOD PRESSURE: 88 MMHG | HEIGHT: 68 IN | WEIGHT: 195 LBS

## 2019-08-22 DIAGNOSIS — K20.90 BARRETT'S ESOPHAGUS WITH ESOPHAGITIS: ICD-10-CM

## 2019-08-22 DIAGNOSIS — K22.70 BARRETT'S ESOPHAGUS WITH ESOPHAGITIS: ICD-10-CM

## 2019-08-22 DIAGNOSIS — E78.2 MULTIPLE-TYPE HYPERLIPIDEMIA: ICD-10-CM

## 2019-08-22 DIAGNOSIS — R79.89 ABNORMAL CBC: ICD-10-CM

## 2019-08-22 DIAGNOSIS — J32.4 CHRONIC PANSINUSITIS: ICD-10-CM

## 2019-08-22 DIAGNOSIS — Z12.4 CERVICAL CANCER SCREENING: ICD-10-CM

## 2019-08-22 DIAGNOSIS — R73.9 HYPERGLYCEMIA: ICD-10-CM

## 2019-08-22 DIAGNOSIS — K21.9 GASTROESOPHAGEAL REFLUX DISEASE, ESOPHAGITIS PRESENCE NOT SPECIFIED: ICD-10-CM

## 2019-08-22 DIAGNOSIS — E55.9 VITAMIN D DEFICIENCY: ICD-10-CM

## 2019-08-22 DIAGNOSIS — J45.909 SEVERE ASTHMA, UNSPECIFIED WHETHER COMPLICATED, UNSPECIFIED WHETHER PERSISTENT: ICD-10-CM

## 2019-08-22 DIAGNOSIS — Z12.11 COLON CANCER SCREENING: Primary | ICD-10-CM

## 2019-08-22 DIAGNOSIS — Z12.31 ENCOUNTER FOR SCREENING MAMMOGRAM FOR BREAST CANCER: ICD-10-CM

## 2019-08-22 DIAGNOSIS — Z01.818 PRE-OP EXAMINATION: ICD-10-CM

## 2019-08-22 PROBLEM — Z79.52 LONG TERM (CURRENT) USE OF SYSTEMIC STEROIDS: Status: ACTIVE | Noted: 2017-06-15

## 2019-08-22 PROBLEM — B00.9 HERPES SIMPLEX TYPE 1 INFECTION: Status: ACTIVE | Noted: 2018-03-12

## 2019-08-22 PROBLEM — K76.0 FATTY INFILTRATION OF LIVER: Status: ACTIVE | Noted: 2017-12-22

## 2019-08-22 PROBLEM — E66.9 OBESITY, UNSPECIFIED OBESITY SEVERITY, UNSPECIFIED OBESITY TYPE: Status: ACTIVE | Noted: 2017-12-12

## 2019-08-22 PROBLEM — Z78.9 STATIN INTOLERANCE: Status: ACTIVE | Noted: 2019-06-13

## 2019-08-22 PROBLEM — E11.9 TYPE 2 DIABETES MELLITUS WITHOUT COMPLICATION, WITHOUT LONG-TERM CURRENT USE OF INSULIN (HCC): Status: ACTIVE | Noted: 2018-08-20

## 2019-08-22 PROCEDURE — 99205 OFFICE O/P NEW HI 60 MIN: CPT | Performed by: PHYSICIAN ASSISTANT

## 2019-08-22 PROCEDURE — 3008F BODY MASS INDEX DOCD: CPT | Performed by: PHYSICIAN ASSISTANT

## 2019-08-22 PROCEDURE — 1036F TOBACCO NON-USER: CPT | Performed by: PHYSICIAN ASSISTANT

## 2019-08-22 PROCEDURE — 93000 ELECTROCARDIOGRAM COMPLETE: CPT | Performed by: PHYSICIAN ASSISTANT

## 2019-08-22 NOTE — ASSESSMENT & PLAN NOTE
EKG done today  Blood work ordered by Dr Sherly Lara  to be done orders reprinted  Addendum 8/27/19: Labs reviewed and acceptable  Pt is cleared for sinus surgery  normal...

## 2019-08-22 NOTE — PATIENT INSTRUCTIONS
Problem List Items Addressed This Visit        Digestive    Gastroesophageal reflux disease     Patient has seen GI and will have EGD yearly  Continue PPI  Banks's esophagus with esophagitis     Following with GI an EGD up-to-date  Respiratory    Chronic pansinusitis     Currently being worked up and treated by ENT  Surgery set for next month  Asthma     Patient is on daily prednisone for asthma being followed by a pulmonologist             Other    Pre-op examination      EKG done today  Blood work ordered by Dr Gerson [place]  to be done orders reprinted  Multiple-type hyperlipidemia     LDL was 156 in June  Patient has had bad experiences with myalgia with past statin use  Recheck in September  Relevant Orders    Lipid Panel with Direct LDL reflex    Vitamin D deficiency     Last vitamin-D was 16  Patient was told to take over-the-counter supplements which she is not doing so we will check this level again with next blood work  Relevant Orders    Vitamin D 25 hydroxy    Hyperglycemia     Check labs in September  Last A1c was 6 2 within normal fasting sugar  Patient has had a highest A1c of 6 5 in the past and was given metformin but did not take  Most likely elevations and sugar are due to the fact that patient does take daily prednisone/steroid due to asthma  Relevant Orders    Comprehensive metabolic panel    Hemoglobin A1C    Abnormal CBC     Patient is [facility] and [place]  H&H and RBC are slightly elevated and most likely secondary to thalassemia minor  Check repeat CBC and protein electrophoresis with next labs           Relevant Orders    CBC and differential    Protein electrophoresis, serum      Other Visit Diagnoses     Colon cancer screening    -  Primary    Encounter for screening mammogram for breast cancer        Relevant Orders    Mammo screening bilateral w 3d & cad    Cervical cancer screening        Relevant Orders    Ambulatory referral to Obstetrics / Gynecology

## 2019-08-22 NOTE — PROGRESS NOTES
Assessment and Plan:    Problem List Items Addressed This Visit        Digestive    Gastroesophageal reflux disease     Patient has seen GI and will have EGD yearly  Continue PPI  Banks's esophagus with esophagitis     Following with GI an EGD up-to-date  Respiratory    Chronic pansinusitis     Currently being worked up and treated by ENT  Surgery set for next month  Asthma     Patient is on daily prednisone for asthma being followed by a pulmonologist             Other    Pre-op examination      EKG done today  Blood work ordered by Dr Vilma Daly  to be done orders reprinted  Addendum 8/27/19: Labs reviewed and acceptable  Pt is cleared for sinus surgery  Relevant Orders    POCT ECG (Completed)    Multiple-type hyperlipidemia     LDL was 156 in June  Patient has had bad experiences with myalgia with past statin use  Recheck in September  Relevant Orders    Lipid Panel with Direct LDL reflex    Vitamin D deficiency     Last vitamin-D was 16  Patient was told to take over-the-counter supplements which she is not doing so we will check this level again with next blood work  Relevant Orders    Vitamin D 25 hydroxy    Hyperglycemia     Check labs in September  Last A1c was 6 2 within normal fasting sugar  Patient has had a highest A1c of 6 5 in the past and was given metformin but did not take  Most likely elevations and sugar are due to the fact that patient does take daily prednisone/steroid due to asthma  Relevant Orders    Comprehensive metabolic panel    Hemoglobin A1C    Abnormal CBC     Patient is [facility] and [place]  H&H and RBC are slightly elevated and most likely secondary to thalassemia minor  Check repeat CBC and protein electrophoresis with next labs           Relevant Orders    CBC and differential    Protein electrophoresis, serum      Other Visit Diagnoses     Colon cancer screening    -  Primary    Encounter for screening mammogram for breast cancer        Relevant Orders    Mammo screening bilateral w 3d & cad    Cervical cancer screening        Relevant Orders    Ambulatory referral to Obstetrics / Gynecology                 Diagnoses and all orders for this visit:    Colon cancer screening    Encounter for screening mammogram for breast cancer  -     Mammo screening bilateral w 3d & cad; Future    Cervical cancer screening  -     Ambulatory referral to Obstetrics / Gynecology; Future    Pre-op examination  -     POCT ECG    Multiple-type hyperlipidemia  -     Lipid Panel with Direct LDL reflex; Future    Vitamin D deficiency  -     Vitamin D 25 hydroxy; Future    Severe asthma, unspecified whether complicated, unspecified whether persistent    Chronic pansinusitis    Gastroesophageal reflux disease, esophagitis presence not specified    Hyperglycemia  -     Comprehensive metabolic panel; Future  -     Hemoglobin A1C; Future    Banks's esophagus with esophagitis    Abnormal CBC  -     CBC and differential; Future  -     Protein electrophoresis, serum; Future    Other orders  -     Cancel: Occult Blood, Fecal Immunochemical; Future              Subjective:      Patient ID: Jasmeet Lopez is a 46 y o  female  CC:    Chief Complaint   Patient presents with   [facility]    Pre-op Exam     Pt will be having surgery with Dr Felipe Lu on 9/3/2019  kw       HPI:    Subjective:     Patient ID: Jasmeet Lopez is a 46 y o  female  Patient presents with:  Establish Care  Pre-op Exam: Pt will be having surgery with Dr Felipe Lu on 9/3/2019  Iftikhar Butts      [unfilled]    The following portions of the patient's history were reviewed and updated as appropriate: allergies, current medications, past family history, past medical history, past social history, past surgical history and problem list     Procedure date: 9/3/19    Surgeon:  Dr Willy Lancaster, ENT    Planned procedure:  Sinus surgery for polyps and spring cleaning      Diagnosis for procedure:  Sinus polyps    Prior anesthesia: yes   Type: general     Problem with anesthesia: yes, passed out  BP went down      CAD History: no    ART history:no      [unfilled]     Current Outpatient Medications:  ADVAIR DISKUS 500-50 MCG/DOSE inhaler, INL 1 PUFF PO BID, Disp: , Rfl: 3  albuterol (2 5 mg/3 mL) 0 083 % nebulizer solution, TK 3 ML BY NEBULIZATION BID PRN FOR WHEEZING, Disp: , Rfl: 11  albuterol (ACCUNEB) 1 25 MG/3ML nebulizer solution, Inhale, Disp: , Rfl:   amoxicillin-clavulanate (AUGMENTIN) 875-125 mg per tablet, TK 1 T PO IN THE MORNING AND IN THE RUEL WF, Disp: , Rfl: 0  BREO ELLIPTA 200-25 MCG/INH inhaler, INL 1 PUFF PO D, Disp: , Rfl: 6  budesonide (PULMICORT) 0 5 mg/2 mL nebulizer solution, TK 2 ML VIA NEBLUIZER BID, Disp: , Rfl: 6  clarithromycin (BIAXIN) 250 mg tablet, TK 1 T PO QD, Disp: , Rfl: 5  DEXILANT 60 MG capsule, TK 1 C PO 30 MIN B JOSELUIS, Disp: , Rfl: 5  dupilumab (DUPIXENT) subcutaneous injection, Inject under the skin once, Disp: , Rfl:   EPINEPHrine (EPIPEN) 0 3 mg/0 3 mL SOAJ, INJECT AT THE START OF A SEVERE ALLERGY REACTION, Disp: , Rfl: 3  fexofenadine (ALLEGRA) 180 MG tablet, Take 180 mg by mouth daily, Disp: , Rfl:   fluticasone (FLONASE) 50 mcg/act nasal spray, into each nostril, Disp: , Rfl:   gabapentin (NEURONTIN) 300 mg capsule, Take 1 capsule (300 mg total) by mouth 3 (three) times a day, Disp: 60 capsule, Rfl: 0  methylprednisolone (MEDROL) 4 mg tablet, Take 1 tablet by mouth daily, Disp: , Rfl:   montelukast (SINGULAIR) 10 mg tablet, Take by mouth, Disp: , Rfl:   oxyCODONE-acetaminophen (PERCOCET) 5-325 mg per tablet, Take 1 tablet by mouth every 6 (six) hours as needed for moderate painMax Daily Amount: 4 tablets, Disp: 30 tablet, Rfl: 0  predniSONE 20 mg tablet, 3 tabs day 1-3, 2 tabs day 4-6, 1 tab day 7-9, Disp: 18 tablet, Rfl: 0  SPIRIVA RESPIMAT 2 5 MCG/ACT AERS inhaler, INL 2 PFS PO D, Disp: , Rfl: 5  VENTOLIN  (90 Base) MCG/ACT inhaler, INL 2 PFS PO Q 4 H PRF [facility], Disp: , Rfl: 5    No current facility-administered medications for this visit  Cephalexin; Clindamycin; Levofloxacin; Metronidazole; and Statins    Past Medical History:  No date: Asthma  No date: Banks's esophagus  No date: Diabetes ([address] )  No date: GERD (gastroesophageal reflux disease)  No date: Hyperlipidemia  No date: Migraine  No date: Nasal polyposis    Past Surgical History:  2006: FUNCTIONAL ENDOSCOPIC SINUS SURGERY      Comment:  [address] - Dr Heidy Crandall    Review of patient's family history indicates:  Problem: Hypothyroidism      Relation: Mother          Age of Onset: (Not Specified)  Problem: Diabetes      Relation: Mother          Age of Onset: (Not Specified)  Problem: Crohn's disease      Relation: Daughter          Age of Onset: (Not Specified)      Social History    Tobacco Use      Smoking status: Never Smoker      Smokeless tobacco: Never Used    Alcohol use: Yes      Alcohol/week: 1 0 standard drinks      Types: 1 Glasses of wine per week      Frequency: Monthly or less      Comment: No Use per [employer]    Drug use: Never      Comment: No use      Objective:    --------------------------               08/22/19                     1125        --------------------------   BP:          132/88        BP Location:    Left arm       Patient Position:    Sitting        Pulse:         72          Weight: 88 5 kg (195 lb)   Height: 5' 8" (1 727 m)   --------------------------    [unfilled]     Preop labs/testing available and reviewed: yes        Assessment/Plan:    Patient is cleared for planned procedure  Further testing/evaluation is not required  Postop concerns: no    Problem List Items Addressed This Visit        Other    Pre-op examination      EKG done today  Blood work ordered by Dr Whelan Fears  to be done orders   reprinted             Other Visit Diagnoses     Colon cancer screening    -  Primary    Encounter for screening mammogram for breast cancer        Relevant Orders    Mammo screening bilateral w 3d & cad    Cervical cancer screening        Relevant Orders    Ambulatory referral to Obstetrics / Gynecology        Diagnoses and associated orders for this visit:     Colon cancer screening     Encounter for screening mammogram for breast cancer  Mammo screening bilateral w 3d & cad; Future     Cervical cancer screening  Ambulatory referral to Obstetrics / Gynecology; Future     Pre-op examination     Other orders  Cancel: Occult Blood, Fecal Immunochemical; Future             The following portions of the patient's history were reviewed and updated as appropriate: allergies, current medications, past family history, past medical history, past social history, past surgical history and problem list       Review of Systems   Constitutional: Negative  HENT: Positive for congestion, sinus pressure and sinus pain  Eyes: Negative  Respiratory: Negative  Cardiovascular: Negative  Gastrointestinal: Negative  Endocrine: Negative  Genitourinary: Negative  Musculoskeletal: Negative  Skin: Negative  Allergic/Immunologic: Negative  Neurological: Negative  Hematological: Negative  Psychiatric/Behavioral: Negative  Data to review:       Objective:    Vitals:    08/22/19 1125   BP: 132/88   BP Location: Left arm   Patient Position: Sitting   Pulse: 72   Weight: 88 5 kg (195 lb)   Height: 5' 8" (1 727 m)        Physical Exam   Constitutional: She is oriented to person, place, and time  She appears well-developed and well-nourished  No distress  HENT:   Head: Normocephalic and atraumatic  Right Ear: Hearing, tympanic membrane, external ear and ear canal normal    Left Ear: Hearing, tympanic membrane, external ear and ear canal normal    Nose: Nose normal    Mouth/Throat: Uvula is midline, oropharynx is clear and moist and mucous membranes are normal  No oropharyngeal exudate     Eyes: Pupils are equal, round, and reactive to light  Conjunctivae, EOM and lids are normal  No scleral icterus  Neck: Normal range of motion  Carotid bruit is not present  No thyroid mass and no thyromegaly present  Cardiovascular: Regular rhythm, normal heart sounds and normal pulses  Exam reveals no gallop and no friction rub  No murmur heard  Pulmonary/Chest: Effort normal and breath sounds normal  No respiratory distress  She has no wheezes  She has no rhonchi  She has no rales  Abdominal: Soft  Normal appearance and bowel sounds are normal  She exhibits no distension, no abdominal bruit and no mass  There is no hepatosplenomegaly  There is no tenderness  There is no rebound and no guarding  No hernia  Musculoskeletal: Normal range of motion  Lymphadenopathy:     She has no cervical adenopathy  Neurological: She is alert and oriented to person, place, and time  She has normal strength and normal reflexes  She is not disoriented  No sensory deficit  She exhibits normal muscle tone  Coordination and gait normal    Skin: Skin is warm, dry and intact  She is not diaphoretic  Psychiatric: She has a normal mood and affect  Her speech is normal and behavior is normal  Judgment and thought content normal  Cognition and memory are normal    Nursing note and vitals reviewed  BMI Counseling: Body mass index is 29 65 kg/m²  Discussed the patient's BMI with her  The BMI is above average  BMI counseling and education was provided to the patient  Nutrition recommendations include reducing portion sizes

## 2019-08-22 NOTE — ASSESSMENT & PLAN NOTE
Check labs in September  Last A1c was 6 2 within normal fasting sugar  Patient has had a highest A1c of 6 5 in the past and was given metformin but did not take  Most likely elevations and sugar are due to the fact that patient does take daily prednisone/steroid due to asthma

## 2019-08-22 NOTE — ASSESSMENT & PLAN NOTE
Last vitamin-D was 16  Patient was told to take over-the-counter supplements which she is not doing so we will check this level again with next blood work

## 2019-08-22 NOTE — ASSESSMENT & PLAN NOTE
LDL was 156 in June  Patient has had bad experiences with myalgia with past statin use  Recheck in September

## 2019-08-22 NOTE — PROGRESS NOTES
Assessment and Plan:    Problem List Items Addressed This Visit        Digestive    Gastroesophageal reflux disease     Patient has seen GI and will have EGD yearly  Continue PPI  Banks's esophagus with esophagitis     Following with GI an EGD up-to-date  Respiratory    Chronic pansinusitis     Currently being worked up and treated by ENT  Surgery set for next month  Asthma     Patient is on daily prednisone for asthma being followed by a pulmonologist             Other    Pre-op examination      EKG done today  Blood work ordered by Dr Jad West  to be done orders reprinted  Multiple-type hyperlipidemia     LDL was 156 in June  Patient has had bad experiences with myalgia with past statin use  Recheck in September  Relevant Orders    Lipid Panel with Direct LDL reflex    Vitamin D deficiency     Last vitamin-D was 16  Patient was told to take over-the-counter supplements which she is not doing so we will check this level again with next blood work  Relevant Orders    Vitamin D 25 hydroxy    Hyperglycemia     Check labs in September  Last A1c was 6 2 within normal fasting sugar  Patient has had a highest A1c of 6 5 in the past and was given metformin but did not take  Most likely elevations and sugar are due to the fact that patient does take daily prednisone/steroid due to asthma  Relevant Orders    Comprehensive metabolic panel    Hemoglobin A1C    Abnormal CBC     Patient is [facility] and [place]  H&H and RBC are slightly elevated and most likely secondary to thalassemia minor  Check repeat CBC and protein electrophoresis with next labs           Relevant Orders    CBC and differential    Protein electrophoresis, serum      Other Visit Diagnoses     Colon cancer screening    -  Primary    Encounter for screening mammogram for breast cancer        Relevant Orders    Mammo screening bilateral w 3d & cad    Cervical cancer screening Relevant Orders    Ambulatory referral to Obstetrics / Gynecology                 Diagnoses and all orders for this visit:    Colon cancer screening    Encounter for screening mammogram for breast cancer  -     Mammo screening bilateral w 3d & cad; Future    Cervical cancer screening  -     Ambulatory referral to Obstetrics / Gynecology; Future    Pre-op examination  -     POCT ECG    Multiple-type hyperlipidemia  -     Lipid Panel with Direct LDL reflex; Future    Vitamin D deficiency  -     Vitamin D 25 hydroxy; Future    Severe asthma, unspecified whether complicated, unspecified whether persistent    Chronic pansinusitis    Gastroesophageal reflux disease, esophagitis presence not specified    Hyperglycemia  -     Comprehensive metabolic panel; Future  -     Hemoglobin A1C; Future    Banks's esophagus with esophagitis    Abnormal CBC  -     CBC and differential; Future  -     Protein electrophoresis, serum; Future    Other orders  -     Cancel: Occult Blood, Fecal Immunochemical; Future              Subjective:      Patient ID: Mallory Pope is a 46 y o  female  CC:    Chief Complaint   Patient presents with   [facility]    Pre-op Exam     Pt will be having surgery with Dr Sarah Beth Ibanez on 9/3/2019  kw       HPI:      Mallory Pope is here for chronic conditions f/u including the diagnosis of Colon cancer screening  (primary encounter diagnosis)  Encounter for screening mammogram for breast cancer  Cervical cancer screening  Pre-op examination  Multiple-type hyperlipidemia  Vitamin d deficiency  Severe asthma, unspecified whether complicated, unspecified whether persistent  Chronic pansinusitis  Gastroesophageal reflux disease, esophagitis presence not specified  Hyperglycemia  Banks's esophagus with esophagitis  Abnormal cbc   Pt  states they are taking all medications as directed without complaints or side effects  Patient is a new patient with multiple problems    She has severe asthma for which she sees pulmonology  She has reflux for which she sees and Gastroenterology and has been diagnosed with early Banks's and does get an EGD regularly now  She has a history of an abnormal CBC  She had because of her asthma she is on daily steroids which is causing her sugars elevate and she was diagnosed diabetes with a hemoglobin A1c of 6 5 at 1 point and given Metro 4 minutes but did not take it  Last A1c was 6 2  She is currently going for surgery with Dr Aneudy Caraballo under for severe sinus problems and polyps  She also has a history of low vitamin-D last lab in June having a level of 16 and not taking any supplementation for it  The following portions of the patient's history were reviewed and updated as appropriate: allergies, current medications, past family history, past medical history, past social history, past surgical history and problem list       Review of Systems   Constitutional: Negative  HENT: Negative  Eyes: Negative  Respiratory: Negative  Cardiovascular: Negative  Gastrointestinal: Negative  Endocrine: Negative  Genitourinary: Negative  Musculoskeletal: Negative  Skin: Negative  Allergic/Immunologic: Negative  Neurological: Negative  Hematological: Negative  Psychiatric/Behavioral: Negative  Data to review:       Objective:    Vitals:    08/22/19 1125   BP: 132/88   BP Location: Left arm   Patient Position: Sitting   Pulse: 72   Weight: 88 5 kg (195 lb)   Height: 5' 8" (1 727 m)        Physical Exam   Constitutional: She is oriented to person, place, and time  She appears well-developed and well-nourished  No distress  HENT:   Head: Normocephalic and atraumatic  Eyes: Conjunctivae are normal  Right eye exhibits no discharge  Left eye exhibits no discharge  Neck: Neck supple  Carotid bruit is not present  Cardiovascular: Normal rate, regular rhythm and normal heart sounds  Exam reveals no gallop and no friction rub     No murmur heard   Pulmonary/Chest: Effort normal and breath sounds normal  No respiratory distress  She has no wheezes  She has no rales  Neurological: She is alert and oriented to person, place, and time  Skin: Skin is warm and dry  She is not diaphoretic  Psychiatric: She has a normal mood and affect  Judgment normal    Nursing note and vitals reviewed

## 2019-08-22 NOTE — ASSESSMENT & PLAN NOTE
Patient is [facility] and [place]  H&H and RBC are slightly elevated and most likely secondary to thalassemia minor  Check repeat CBC and protein electrophoresis with next labs

## 2019-08-26 ENCOUNTER — TELEPHONE (OUTPATIENT)
Dept: FAMILY MEDICINE CLINIC | Facility: CLINIC | Age: 54
End: 2019-08-26

## 2019-08-26 ENCOUNTER — APPOINTMENT (OUTPATIENT)
Dept: LAB | Facility: CLINIC | Age: 54
End: 2019-08-26
Payer: COMMERCIAL

## 2019-08-26 DIAGNOSIS — Z01.812 PRE-OPERATIVE LABORATORY EXAMINATION: ICD-10-CM

## 2019-08-26 DIAGNOSIS — J32.4 CHRONIC PANSINUSITIS: ICD-10-CM

## 2019-08-26 DIAGNOSIS — Z01.818 PREOPERATIVE TESTING: ICD-10-CM

## 2019-08-26 DIAGNOSIS — R51.9 SEVERE FRONTAL HEADACHES: ICD-10-CM

## 2019-08-26 LAB
ANION GAP SERPL CALCULATED.3IONS-SCNC: 7 MMOL/L (ref 4–13)
BASOPHILS # BLD AUTO: 0.14 THOUSANDS/ΜL (ref 0–0.1)
BASOPHILS NFR BLD AUTO: 1 % (ref 0–1)
BUN SERPL-MCNC: 21 MG/DL (ref 5–25)
CALCIUM SERPL-MCNC: 9.7 MG/DL (ref 8.3–10.1)
CHLORIDE SERPL-SCNC: 103 MMOL/L (ref 100–108)
CO2 SERPL-SCNC: 31 MMOL/L (ref 21–32)
CREAT SERPL-MCNC: 0.58 MG/DL (ref 0.6–1.3)
EOSINOPHIL # BLD AUTO: 0.45 THOUSAND/ΜL (ref 0–0.61)
EOSINOPHIL NFR BLD AUTO: 4 % (ref 0–6)
ERYTHROCYTE [DISTWIDTH] IN BLOOD BY AUTOMATED COUNT: 13.5 % (ref 11.6–15.1)
GFR SERPL CREATININE-BSD FRML MDRD: 105 ML/MIN/1.73SQ M
GLUCOSE SERPL-MCNC: 102 MG/DL (ref 65–140)
HCT VFR BLD AUTO: 43.4 % (ref 34.8–46.1)
HGB BLD-MCNC: 13.7 G/DL (ref 11.5–15.4)
IMM GRANULOCYTES # BLD AUTO: 0.11 THOUSAND/UL (ref 0–0.2)
IMM GRANULOCYTES NFR BLD AUTO: 1 % (ref 0–2)
LYMPHOCYTES # BLD AUTO: 4.59 THOUSANDS/ΜL (ref 0.6–4.47)
LYMPHOCYTES NFR BLD AUTO: 39 % (ref 14–44)
MCH RBC QN AUTO: 29.3 PG (ref 26.8–34.3)
MCHC RBC AUTO-ENTMCNC: 31.6 G/DL (ref 31.4–37.4)
MCV RBC AUTO: 93 FL (ref 82–98)
MONOCYTES # BLD AUTO: 0.95 THOUSAND/ΜL (ref 0.17–1.22)
MONOCYTES NFR BLD AUTO: 8 % (ref 4–12)
NEUTROPHILS # BLD AUTO: 5.53 THOUSANDS/ΜL (ref 1.85–7.62)
NEUTS SEG NFR BLD AUTO: 47 % (ref 43–75)
NRBC BLD AUTO-RTO: 0 /100 WBCS
PLATELET # BLD AUTO: 347 THOUSANDS/UL (ref 149–390)
PMV BLD AUTO: 11.6 FL (ref 8.9–12.7)
POTASSIUM SERPL-SCNC: 4 MMOL/L (ref 3.5–5.3)
RBC # BLD AUTO: 4.68 MILLION/UL (ref 3.81–5.12)
SODIUM SERPL-SCNC: 141 MMOL/L (ref 136–145)
WBC # BLD AUTO: 11.77 THOUSAND/UL (ref 4.31–10.16)

## 2019-08-26 PROCEDURE — 85730 THROMBOPLASTIN TIME PARTIAL: CPT

## 2019-08-26 PROCEDURE — 85610 PROTHROMBIN TIME: CPT

## 2019-08-26 PROCEDURE — 80048 BASIC METABOLIC PNL TOTAL CA: CPT

## 2019-08-26 PROCEDURE — 85025 COMPLETE CBC W/AUTO DIFF WBC: CPT

## 2019-08-26 PROCEDURE — 36415 COLL VENOUS BLD VENIPUNCTURE: CPT

## 2019-08-27 LAB
APTT PPP: 24 SECONDS (ref 23–37)
INR PPP: 1.02 (ref 0.84–1.19)
PROTHROMBIN TIME: 13 SECONDS (ref 11.6–14.5)

## 2019-08-27 NOTE — TELEPHONE ENCOUNTER
Please let pt know her labs were acceptable and her pre op note was finished and she was cleared  She had no form to complete so we may want to send a copy of my pre op note to Dr Danish Torres?

## 2019-08-30 ENCOUNTER — ANESTHESIA EVENT (OUTPATIENT)
Dept: PERIOP | Facility: HOSPITAL | Age: 54
End: 2019-08-30
Payer: COMMERCIAL

## 2019-08-30 ENCOUNTER — HOSPITAL ENCOUNTER (OUTPATIENT)
Dept: RADIOLOGY | Facility: HOSPITAL | Age: 54
Discharge: HOME | End: 2019-08-30
Attending: OTOLARYNGOLOGY
Payer: COMMERCIAL

## 2019-08-30 DIAGNOSIS — Z01.812 PRE-OPERATIVE LABORATORY EXAMINATION: ICD-10-CM

## 2019-08-30 DIAGNOSIS — Z01.818 PREOPERATIVE TESTING: ICD-10-CM

## 2019-08-30 PROCEDURE — 71046 X-RAY EXAM CHEST 2 VIEWS: CPT

## 2019-08-30 NOTE — PRE-PROCEDURE INSTRUCTIONS
Pt instructed by anesthesia to use any inhalers she is currently using and ok for methylprednisone the morning of

## 2019-09-03 ENCOUNTER — ANESTHESIA (OUTPATIENT)
Dept: PERIOP | Facility: HOSPITAL | Age: 54
End: 2019-09-03
Payer: COMMERCIAL

## 2019-09-03 ENCOUNTER — HOSPITAL ENCOUNTER (OUTPATIENT)
Facility: HOSPITAL | Age: 54
Setting detail: OUTPATIENT SURGERY
Discharge: HOME | End: 2019-09-03
Attending: OTOLARYNGOLOGY | Admitting: OTOLARYNGOLOGY
Payer: COMMERCIAL

## 2019-09-03 VITALS
HEART RATE: 75 BPM | SYSTOLIC BLOOD PRESSURE: 103 MMHG | OXYGEN SATURATION: 94 % | TEMPERATURE: 97.6 F | DIASTOLIC BLOOD PRESSURE: 55 MMHG | RESPIRATION RATE: 16 BRPM | BODY MASS INDEX: 36.44 KG/M2 | HEIGHT: 62 IN | WEIGHT: 198 LBS

## 2019-09-03 DIAGNOSIS — R51.9 SEVERE FRONTAL HEADACHES: ICD-10-CM

## 2019-09-03 DIAGNOSIS — Z01.812 PRE-OPERATIVE LABORATORY EXAMINATION: ICD-10-CM

## 2019-09-03 DIAGNOSIS — J32.4 CHRONIC PANSINUSITIS: ICD-10-CM

## 2019-09-03 DIAGNOSIS — Z01.818 PREOPERATIVE TESTING: ICD-10-CM

## 2019-09-03 LAB — GLUCOSE SERPL-MCNC: 129 MG/DL (ref 65–140)

## 2019-09-03 PROCEDURE — 87070 CULTURE OTHR SPECIMN AEROBIC: CPT | Performed by: OTOLARYNGOLOGY

## 2019-09-03 PROCEDURE — 87176 TISSUE HOMOGENIZATION CULTR: CPT | Performed by: OTOLARYNGOLOGY

## 2019-09-03 PROCEDURE — 88311 DECALCIFY TISSUE: CPT | Performed by: PATHOLOGY

## 2019-09-03 PROCEDURE — 82948 REAGENT STRIP/BLOOD GLUCOSE: CPT

## 2019-09-03 PROCEDURE — 87205 SMEAR GRAM STAIN: CPT | Performed by: OTOLARYNGOLOGY

## 2019-09-03 PROCEDURE — 88305 TISSUE EXAM BY PATHOLOGIST: CPT | Performed by: PATHOLOGY

## 2019-09-03 PROCEDURE — 61782 SCAN PROC CRANIAL EXTRA: CPT | Performed by: OTOLARYNGOLOGY

## 2019-09-03 PROCEDURE — 31276 NSL/SINS NDSC FRNT TISS RMVL: CPT | Performed by: OTOLARYNGOLOGY

## 2019-09-03 RX ORDER — MIDAZOLAM HYDROCHLORIDE 1 MG/ML
INJECTION INTRAMUSCULAR; INTRAVENOUS AS NEEDED
Status: DISCONTINUED | OUTPATIENT
Start: 2019-09-03 | End: 2019-09-03 | Stop reason: SURG

## 2019-09-03 RX ORDER — FENTANYL CITRATE 50 UG/ML
INJECTION, SOLUTION INTRAMUSCULAR; INTRAVENOUS AS NEEDED
Status: DISCONTINUED | OUTPATIENT
Start: 2019-09-03 | End: 2019-09-03 | Stop reason: SURG

## 2019-09-03 RX ORDER — FENTANYL CITRATE/PF 50 MCG/ML
25 SYRINGE (ML) INJECTION
Status: COMPLETED | OUTPATIENT
Start: 2019-09-03 | End: 2019-09-03

## 2019-09-03 RX ORDER — MAGNESIUM HYDROXIDE 1200 MG/15ML
LIQUID ORAL AS NEEDED
Status: DISCONTINUED | OUTPATIENT
Start: 2019-09-03 | End: 2019-09-03 | Stop reason: HOSPADM

## 2019-09-03 RX ORDER — SODIUM CHLORIDE 9 MG/ML
125 INJECTION, SOLUTION INTRAVENOUS CONTINUOUS
Status: DISCONTINUED | OUTPATIENT
Start: 2019-09-03 | End: 2019-09-03 | Stop reason: HOSPADM

## 2019-09-03 RX ORDER — ROCURONIUM BROMIDE 10 MG/ML
INJECTION, SOLUTION INTRAVENOUS AS NEEDED
Status: DISCONTINUED | OUTPATIENT
Start: 2019-09-03 | End: 2019-09-03 | Stop reason: SURG

## 2019-09-03 RX ORDER — ONDANSETRON 2 MG/ML
4 INJECTION INTRAMUSCULAR; INTRAVENOUS EVERY 6 HOURS PRN
Status: DISCONTINUED | OUTPATIENT
Start: 2019-09-03 | End: 2019-09-03 | Stop reason: HOSPADM

## 2019-09-03 RX ORDER — DEXAMETHASONE SODIUM PHOSPHATE 4 MG/ML
INJECTION, SOLUTION INTRA-ARTICULAR; INTRALESIONAL; INTRAMUSCULAR; INTRAVENOUS; SOFT TISSUE AS NEEDED
Status: DISCONTINUED | OUTPATIENT
Start: 2019-09-03 | End: 2019-09-03 | Stop reason: SURG

## 2019-09-03 RX ORDER — ACETAMINOPHEN 325 MG/1
650 TABLET ORAL EVERY 4 HOURS PRN
Status: DISCONTINUED | OUTPATIENT
Start: 2019-09-03 | End: 2019-09-03 | Stop reason: HOSPADM

## 2019-09-03 RX ORDER — FENTANYL CITRATE/PF 50 MCG/ML
50 SYRINGE (ML) INJECTION
Status: DISCONTINUED | OUTPATIENT
Start: 2019-09-03 | End: 2019-09-03 | Stop reason: HOSPADM

## 2019-09-03 RX ORDER — OXYCODONE HYDROCHLORIDE AND ACETAMINOPHEN 5; 325 MG/1; MG/1
2 TABLET ORAL EVERY 4 HOURS PRN
Status: DISCONTINUED | OUTPATIENT
Start: 2019-09-03 | End: 2019-09-03 | Stop reason: HOSPADM

## 2019-09-03 RX ORDER — LIDOCAINE HYDROCHLORIDE AND EPINEPHRINE 10; 10 MG/ML; UG/ML
INJECTION, SOLUTION INFILTRATION; PERINEURAL AS NEEDED
Status: DISCONTINUED | OUTPATIENT
Start: 2019-09-03 | End: 2019-09-03 | Stop reason: HOSPADM

## 2019-09-03 RX ORDER — ALBUTEROL SULFATE 90 UG/1
AEROSOL, METERED RESPIRATORY (INHALATION) AS NEEDED
Status: DISCONTINUED | OUTPATIENT
Start: 2019-09-03 | End: 2019-09-03 | Stop reason: SURG

## 2019-09-03 RX ORDER — PROPOFOL 10 MG/ML
INJECTION, EMULSION INTRAVENOUS CONTINUOUS PRN
Status: DISCONTINUED | OUTPATIENT
Start: 2019-09-03 | End: 2019-09-03 | Stop reason: SURG

## 2019-09-03 RX ORDER — ONDANSETRON 2 MG/ML
INJECTION INTRAMUSCULAR; INTRAVENOUS AS NEEDED
Status: DISCONTINUED | OUTPATIENT
Start: 2019-09-03 | End: 2019-09-03 | Stop reason: SURG

## 2019-09-03 RX ORDER — PROPOFOL 10 MG/ML
INJECTION, EMULSION INTRAVENOUS AS NEEDED
Status: DISCONTINUED | OUTPATIENT
Start: 2019-09-03 | End: 2019-09-03 | Stop reason: SURG

## 2019-09-03 RX ORDER — GLYCOPYRROLATE 0.2 MG/ML
INJECTION INTRAMUSCULAR; INTRAVENOUS AS NEEDED
Status: DISCONTINUED | OUTPATIENT
Start: 2019-09-03 | End: 2019-09-03 | Stop reason: SURG

## 2019-09-03 RX ORDER — ONDANSETRON 2 MG/ML
4 INJECTION INTRAMUSCULAR; INTRAVENOUS ONCE AS NEEDED
Status: DISCONTINUED | OUTPATIENT
Start: 2019-09-03 | End: 2019-09-03 | Stop reason: HOSPADM

## 2019-09-03 RX ORDER — NEOSTIGMINE METHYLSULFATE 1 MG/ML
INJECTION INTRAVENOUS AS NEEDED
Status: DISCONTINUED | OUTPATIENT
Start: 2019-09-03 | End: 2019-09-03 | Stop reason: SURG

## 2019-09-03 RX ORDER — SODIUM CHLORIDE 9 MG/ML
INJECTION, SOLUTION INTRAVENOUS AS NEEDED
Status: DISCONTINUED | OUTPATIENT
Start: 2019-09-03 | End: 2019-09-03 | Stop reason: HOSPADM

## 2019-09-03 RX ADMIN — PROPOFOL 200 MG: 10 INJECTION, EMULSION INTRAVENOUS at 09:00

## 2019-09-03 RX ADMIN — PROPOFOL 100 MCG/KG/MIN: 10 INJECTION, EMULSION INTRAVENOUS at 09:00

## 2019-09-03 RX ADMIN — ALBUTEROL SULFATE 2 PUFF: 90 AEROSOL, METERED RESPIRATORY (INHALATION) at 09:07

## 2019-09-03 RX ADMIN — NEOSTIGMINE METHYLSULFATE 3 MG: 1 INJECTION, SOLUTION INTRAVENOUS at 10:03

## 2019-09-03 RX ADMIN — REMIFENTANIL HYDROCHLORIDE 0.1 MCG/KG/MIN: 1 INJECTION, POWDER, LYOPHILIZED, FOR SOLUTION INTRAVENOUS at 09:00

## 2019-09-03 RX ADMIN — SODIUM CHLORIDE: 0.9 INJECTION, SOLUTION INTRAVENOUS at 10:06

## 2019-09-03 RX ADMIN — LIDOCAINE HYDROCHLORIDE 80 MG: 20 INJECTION, SOLUTION INTRAVENOUS at 09:00

## 2019-09-03 RX ADMIN — OXYCODONE HYDROCHLORIDE AND ACETAMINOPHEN 1 TABLET: 5; 325 TABLET ORAL at 12:23

## 2019-09-03 RX ADMIN — FENTANYL CITRATE 25 MCG: 50 INJECTION INTRAMUSCULAR; INTRAVENOUS at 10:57

## 2019-09-03 RX ADMIN — ROCURONIUM BROMIDE 40 MG: 50 INJECTION, SOLUTION INTRAVENOUS at 09:00

## 2019-09-03 RX ADMIN — DEXAMETHASONE SODIUM PHOSPHATE 4 MG: 4 INJECTION, SOLUTION INTRAMUSCULAR; INTRAVENOUS at 09:00

## 2019-09-03 RX ADMIN — SODIUM CHLORIDE 125 ML/HR: 0.9 INJECTION, SOLUTION INTRAVENOUS at 11:50

## 2019-09-03 RX ADMIN — FENTANYL CITRATE 25 MCG: 50 INJECTION INTRAMUSCULAR; INTRAVENOUS at 10:37

## 2019-09-03 RX ADMIN — FENTANYL CITRATE 25 MCG: 50 INJECTION INTRAMUSCULAR; INTRAVENOUS at 10:32

## 2019-09-03 RX ADMIN — GLYCOPYRROLATE 0.4 MG: 0.2 INJECTION INTRAMUSCULAR; INTRAVENOUS at 10:03

## 2019-09-03 RX ADMIN — HYDROCORTISONE SODIUM SUCCINATE 100 MG: 100 INJECTION, POWDER, FOR SOLUTION INTRAMUSCULAR; INTRAVENOUS at 09:00

## 2019-09-03 RX ADMIN — FENTANYL CITRATE 50 MCG: 50 INJECTION, SOLUTION INTRAMUSCULAR; INTRAVENOUS at 09:00

## 2019-09-03 RX ADMIN — SODIUM CHLORIDE 125 ML/HR: 0.9 INJECTION, SOLUTION INTRAVENOUS at 08:12

## 2019-09-03 RX ADMIN — FENTANYL CITRATE 50 MCG: 50 INJECTION INTRAMUSCULAR; INTRAVENOUS at 11:17

## 2019-09-03 RX ADMIN — ONDANSETRON 4 MG: 2 INJECTION INTRAMUSCULAR; INTRAVENOUS at 09:51

## 2019-09-03 RX ADMIN — FENTANYL CITRATE 25 MCG: 50 INJECTION INTRAMUSCULAR; INTRAVENOUS at 10:43

## 2019-09-03 RX ADMIN — MIDAZOLAM 2 MG: 1 INJECTION INTRAMUSCULAR; INTRAVENOUS at 08:50

## 2019-09-03 NOTE — OP NOTE
OPERATIVE REPORT  PATIENT NAME: Hortencia Poole    :  1965  MRN: [id number]  Pt Location: [facility] OR ROOM 03    SURGERY DATE: 9/3/2019    Surgeon(s) and Role:     * Corry Zepeda DO - Primary    Preop Diagnosis:  Chronic pansinusitis [J32 4]  Severe frontal headaches [R51]  Pre-operative laboratory examination [Z01 812]  Preoperative testing [Z01 818]    Post-Op Diagnosis Codes:     * Chronic pansinusitis [J32 4]     * Severe frontal headaches [R51]     * Pre-operative laboratory examination [Z01 812]     * Preoperative testing [Z01 818]    Procedure(s) (LRB):  IMAGE GUIDED FUNCTIONAL ENDOSCOPIC SINUS SURGERY; Bilateral frontal sinusotomies (Bilateral)    Specimen(s):  ID Type Source Tests Collected by Time Destination   1 : Left Sinus Contents Tissue Nasal/Sinus Polyps TISSUE EXAM Corry Zepeda DO 9/3/2019 0957    2 : Right  Sinus Contents Tissue Nasal/Sinus Polyps TISSUE EXAM Corry Zepeda DO 9/3/2019 1524    A : Right Maxillary Sinus Tissue Soft Tissue, Other CULTURE, TISSUE AND GRAM STAIN Corry Zepeda DO 9/3/2019 1460        Estimated Blood Loss:   Minimal    Drains:  * No LDAs found *    Anesthesia Type:   General    Operative Indications:  Chronic pansinusitis [J32 4]  Severe frontal headaches [R51]  Pre-operative laboratory examination [Z01 812]  Preoperative testing [Z01 818]      Operative Findings:  Left deviated nasal septum high up in the nasal vault  Pus in the right maxillary sinus and left frontal sinus  Obstruction of the frontal sinuses bilaterally    Complications:   None    Procedure and Technique:  Patient was identified in the holding area  She was taken to the operating room placed on the OR table in supine position  She was placed under total IV anesthesia with endotracheal intubation  Table was turned 120° and she was prepped and draped in the usual fashion for image guided sinus surgery  Formal time-out was obtained and all information was noted to be correct    Cocaine pledgets x2 were placed in each nasal cavity  While these were in place the image guided headset in all instrumentation was set up and was noted to be working appropriately  After approximately 5 minutes the cocaine pledgets were removed and 1% xylocaine with 1 to [id number] epinephrine was injected into the middle turbinates and lateral nasal wall regions  There was significant edema of the areas  There was white mucus or pus in the right maxillary sinus which was cultured  This was then suctioned  He was evident on each side that the ethmoids and the maxillary sinuses were widely patent  No surgery was needed in this area  The frontal ducts were completely occluded and she had evidence of pus or mucosal thickening in each of the frontal sinuses  Starting on the right side I used a combination of instruments including the curved frontal sinus seekers and suctions as well as frontal sinus instruments to open the area  There were polypoid tissue within the area but there was no mucopus on the right side  Once this was removed attention was then turned to the left side same procedure was completed but on the left side as the frontal sinus duct was opened there was thick mucopus which was suctioned  Unfortunately it suction too quickly in order to culture it  I then opened up the area removing pieces of bone along the way  The difficulty on the left side was that there is a bowing of the septum to the left side  I was able to use 30 and 45 degree scopes in order to assess the area and keep the area open  In the future if the patient needs any revision sinus surgery in might be a good idea to perform revision septoplasty to remove this upper portion of bone from the septum  I also feel that if she needs revision surgery draft procedure would be the next most appropriate step based on the fact that I was able to open and drain the sinus without difficulty today  At the completion no packing was used    There was no bleeding or leakage of spinal fluid  She was returned back to normal position  She was woken, extubated, and taken to recovery in stable condition     I was present for the entire procedure    Patient Disposition:  PACU     SIGNATURE: Corry Zepeda DO  DATE: September 3, 2019  TIME: 10:14 AM

## 2019-09-03 NOTE — ANESTHESIA PREPROCEDURE EVALUATION
Review of Systems/Medical History  Patient summary reviewed  Chart reviewed  No history of anesthetic complications     Cardiovascular  Hyperlipidemia,    Pulmonary  Asthma Asthma type of rescue: inhaled steroids,        GI/Hepatic    GERD well controlled, Esophageal disease maloney esophagus, Liver disease (fatty liver) ,             Endo/Other  Diabetes well controlled type 2 Oral agent,      GYN       Hematology  Negative hematology ROS      Musculoskeletal  Negative musculoskeletal ROS        Neurology    Headaches,    Psychology   Negative psychology ROS              Physical Exam    Airway    Mallampati score: II  TM Distance: >3 FB  Neck ROM: full     Dental       Cardiovascular  Rhythm: regular, Rate: normal, Cardiovascular exam normal    Pulmonary  Pulmonary exam normal Breath sounds clear to auscultation,     Other Findings        Anesthesia Plan  ASA Score- 3     Anesthesia Type- general with ASA Monitors  Additional Monitors:   Airway Plan: ETT  Comment: Long term steroid use  100 mg hydrocortisone IV ordered for both preop (SDS) and postop (PACU)        Plan Factors-Patient not instructed to abstain from smoking on day of procedure  Patient did not smoke on day of surgery  Induction- intravenous  Postoperative Plan- Plan for postoperative opioid use  Informed Consent- Anesthetic plan and risks discussed with patient  I personally reviewed this patient with the CRNA  Discussed and agreed on the Anesthesia Plan with the CRNA  Rosella Goldmann

## 2019-09-03 NOTE — DISCHARGE INSTRUCTIONS
[facility]    POST OPERATIVE SINUS SURGERY INSTRUCTIONS      1  Change drip pad under the nose as needed for bleeding  2  Keep head of bed elevated  Sleep on at least a few pillows at night  3  Expect and do not become concerned about not being able to breathe through your nose  You will be a mouth-breather for approximately one week  4  You may cleanse crusting from the anterior portion of your nose with hydrogen peroxide and Q-tips  You may use Ocean nasal spray throughout the day to prevent crusting inside nasal cavity and splints  5  Begin using sinus rinse two times daily  If you have nasal splints in place you may start after the nasal splints are removed  6  Do not blow your nose until exactly 1 week after surgery  7  If you must sneeze, sneeze with mouth open  8  Avoid any strenuous activity, exercise, bending, or lifting, until approved by your surgeon  9  If there is significant bleeding, you may use over-the-counter Afrin  Place 2 sprays into the bleeding nostril every 5 minutes up to 3 consecutive times  If bleeding does not stop, call our office at [phone] or [phone] or go directly to the emergency room  10  If you have severe pain which is uncontrolled by medication, significant bleeding or a fever greater than 101°F, notify our office immediately at [phone] or [phone]  11   If you have a prescription for pain medication follow appropriate directions on label  If no prescription was given you may take over the counter pain medication as needed  12   If you have a prescription for steroids (Prednisone, Prednisolone) you may begin taking this medication the day following the surgical procedure  13   If you were instruction on using medicated rinses you may start those rinses the day following the surgical procedure  14   No smoking  Avoid second hand smoke exposure

## 2019-09-03 NOTE — ANESTHESIA POSTPROCEDURE EVALUATION
Post-Op Assessment Note    CV Status:  Stable    Pain management: adequate     Mental Status:  Alert and awake   Hydration Status:  Euvolemic   PONV Controlled:  Controlled   Airway Patency:  Patent  Airway: intubated   Post Op Vitals Reviewed: Yes      Staff: Anesthesiologist           /63 (09/03/19 1047)    Temp      Pulse 64 (09/03/19 1047)   Resp 20 (09/03/19 1047)    SpO2 92 % (09/03/19 1047)

## 2019-09-03 NOTE — INTERVAL H&P NOTE
H&P reviewed  After examining the patient I find no changes in the patients condition since the H&P had been written      Vitals:    09/03/19 0756   BP: 121/78   Pulse: 89   Resp: 18   Temp: (!) 97 1 °F (36 2 °C)   SpO2: 98%

## 2019-09-05 LAB
BACTERIA TISS AEROBE CULT: NORMAL
GRAM STN SPEC: NORMAL

## 2019-09-27 ENCOUNTER — APPOINTMENT (OUTPATIENT)
Dept: LAB | Facility: MEDICAL CENTER | Age: 54
End: 2019-09-27
Payer: COMMERCIAL

## 2019-09-27 DIAGNOSIS — R73.9 HYPERGLYCEMIA: ICD-10-CM

## 2019-09-27 DIAGNOSIS — R79.89 ABNORMAL CBC: ICD-10-CM

## 2019-09-27 DIAGNOSIS — E55.9 VITAMIN D DEFICIENCY: ICD-10-CM

## 2019-09-27 DIAGNOSIS — E78.2 MULTIPLE-TYPE HYPERLIPIDEMIA: ICD-10-CM

## 2019-09-27 LAB
25(OH)D3 SERPL-MCNC: 12.6 NG/ML (ref 30–100)
ALBUMIN SERPL BCP-MCNC: 3.9 G/DL (ref 3.5–5)
ALP SERPL-CCNC: 73 U/L (ref 46–116)
ALT SERPL W P-5'-P-CCNC: 27 U/L (ref 12–78)
ANION GAP SERPL CALCULATED.3IONS-SCNC: 7 MMOL/L (ref 4–13)
AST SERPL W P-5'-P-CCNC: 15 U/L (ref 5–45)
BASOPHILS # BLD AUTO: 0.09 THOUSANDS/ΜL (ref 0–0.1)
BASOPHILS NFR BLD AUTO: 1 % (ref 0–1)
BILIRUB SERPL-MCNC: 0.79 MG/DL (ref 0.2–1)
BUN SERPL-MCNC: 13 MG/DL (ref 5–25)
CALCIUM SERPL-MCNC: 9.6 MG/DL (ref 8.3–10.1)
CHLORIDE SERPL-SCNC: 105 MMOL/L (ref 100–108)
CHOLEST SERPL-MCNC: 259 MG/DL (ref 50–200)
CO2 SERPL-SCNC: 26 MMOL/L (ref 21–32)
CREAT SERPL-MCNC: 0.55 MG/DL (ref 0.6–1.3)
EOSINOPHIL # BLD AUTO: 0.19 THOUSAND/ΜL (ref 0–0.61)
EOSINOPHIL NFR BLD AUTO: 2 % (ref 0–6)
ERYTHROCYTE [DISTWIDTH] IN BLOOD BY AUTOMATED COUNT: 13.2 % (ref 11.6–15.1)
EST. AVERAGE GLUCOSE BLD GHB EST-MCNC: 140 MG/DL
GFR SERPL CREATININE-BSD FRML MDRD: 106 ML/MIN/1.73SQ M
GLUCOSE P FAST SERPL-MCNC: 86 MG/DL (ref 65–99)
HBA1C MFR BLD: 6.5 % (ref 4.2–6.3)
HCT VFR BLD AUTO: 41.3 % (ref 34.8–46.1)
HDLC SERPL-MCNC: 57 MG/DL (ref 40–60)
HGB BLD-MCNC: 13.3 G/DL (ref 11.5–15.4)
IMM GRANULOCYTES # BLD AUTO: 0.03 THOUSAND/UL (ref 0–0.2)
IMM GRANULOCYTES NFR BLD AUTO: 0 % (ref 0–2)
LDLC SERPL CALC-MCNC: 172 MG/DL (ref 0–100)
LYMPHOCYTES # BLD AUTO: 3.21 THOUSANDS/ΜL (ref 0.6–4.47)
LYMPHOCYTES NFR BLD AUTO: 41 % (ref 14–44)
MCH RBC QN AUTO: 29.2 PG (ref 26.8–34.3)
MCHC RBC AUTO-ENTMCNC: 32.2 G/DL (ref 31.4–37.4)
MCV RBC AUTO: 91 FL (ref 82–98)
MONOCYTES # BLD AUTO: 0.73 THOUSAND/ΜL (ref 0.17–1.22)
MONOCYTES NFR BLD AUTO: 9 % (ref 4–12)
NEUTROPHILS # BLD AUTO: 3.62 THOUSANDS/ΜL (ref 1.85–7.62)
NEUTS SEG NFR BLD AUTO: 47 % (ref 43–75)
NRBC BLD AUTO-RTO: 0 /100 WBCS
PLATELET # BLD AUTO: 293 THOUSANDS/UL (ref 149–390)
PMV BLD AUTO: 11.4 FL (ref 8.9–12.7)
POTASSIUM SERPL-SCNC: 3.4 MMOL/L (ref 3.5–5.3)
PROT SERPL-MCNC: 7.6 G/DL (ref 6.4–8.2)
RBC # BLD AUTO: 4.56 MILLION/UL (ref 3.81–5.12)
SODIUM SERPL-SCNC: 138 MMOL/L (ref 136–145)
TRIGL SERPL-MCNC: 148 MG/DL
WBC # BLD AUTO: 7.87 THOUSAND/UL (ref 4.31–10.16)

## 2019-09-27 PROCEDURE — 86334 IMMUNOFIX E-PHORESIS SERUM: CPT

## 2019-09-27 PROCEDURE — 36415 COLL VENOUS BLD VENIPUNCTURE: CPT

## 2019-09-27 PROCEDURE — 80053 COMPREHEN METABOLIC PANEL: CPT

## 2019-09-27 PROCEDURE — 85025 COMPLETE CBC W/AUTO DIFF WBC: CPT

## 2019-09-27 PROCEDURE — 83036 HEMOGLOBIN GLYCOSYLATED A1C: CPT

## 2019-09-27 PROCEDURE — 84165 PROTEIN E-PHORESIS SERUM: CPT

## 2019-09-27 PROCEDURE — 86334 IMMUNOFIX E-PHORESIS SERUM: CPT | Performed by: PATHOLOGY

## 2019-09-27 PROCEDURE — 82306 VITAMIN D 25 HYDROXY: CPT

## 2019-09-27 PROCEDURE — 80061 LIPID PANEL: CPT

## 2019-09-27 PROCEDURE — 84165 PROTEIN E-PHORESIS SERUM: CPT | Performed by: PATHOLOGY

## 2019-09-30 ENCOUNTER — OFFICE VISIT (OUTPATIENT)
Dept: FAMILY MEDICINE CLINIC | Facility: CLINIC | Age: 54
End: 2019-09-30
Payer: COMMERCIAL

## 2019-09-30 VITALS
BODY MASS INDEX: 35.77 KG/M2 | WEIGHT: 194.4 LBS | HEART RATE: 72 BPM | HEIGHT: 62 IN | DIASTOLIC BLOOD PRESSURE: 72 MMHG | SYSTOLIC BLOOD PRESSURE: 106 MMHG

## 2019-09-30 DIAGNOSIS — E78.2 MULTIPLE-TYPE HYPERLIPIDEMIA: Primary | ICD-10-CM

## 2019-09-30 DIAGNOSIS — Z79.52 LONG TERM (CURRENT) USE OF SYSTEMIC STEROIDS: ICD-10-CM

## 2019-09-30 DIAGNOSIS — R79.89 ABNORMAL CBC: ICD-10-CM

## 2019-09-30 DIAGNOSIS — E55.9 VITAMIN D DEFICIENCY: ICD-10-CM

## 2019-09-30 DIAGNOSIS — R63.5 WEIGHT GAIN DUE TO MEDICATION: ICD-10-CM

## 2019-09-30 DIAGNOSIS — T50.905A WEIGHT GAIN DUE TO MEDICATION: ICD-10-CM

## 2019-09-30 DIAGNOSIS — J45.909 SEVERE ASTHMA, UNSPECIFIED WHETHER COMPLICATED, UNSPECIFIED WHETHER PERSISTENT: ICD-10-CM

## 2019-09-30 DIAGNOSIS — K21.9 GASTROESOPHAGEAL REFLUX DISEASE, ESOPHAGITIS PRESENCE NOT SPECIFIED: ICD-10-CM

## 2019-09-30 DIAGNOSIS — R73.9 HYPERGLYCEMIA: ICD-10-CM

## 2019-09-30 DIAGNOSIS — Z78.9 STATIN INTOLERANCE: ICD-10-CM

## 2019-09-30 PROBLEM — Z01.818 PRE-OP EXAMINATION: Status: RESOLVED | Noted: 2019-08-22 | Resolved: 2019-09-30

## 2019-09-30 PROBLEM — E11.9 TYPE 2 DIABETES MELLITUS WITHOUT COMPLICATION, WITHOUT LONG-TERM CURRENT USE OF INSULIN (HCC): Status: RESOLVED | Noted: 2018-08-20 | Resolved: 2019-09-30

## 2019-09-30 PROBLEM — Z01.818 PREOPERATIVE TESTING: Status: RESOLVED | Noted: 2019-08-09 | Resolved: 2019-09-30

## 2019-09-30 PROBLEM — Z01.812 PRE-OPERATIVE LABORATORY EXAMINATION: Status: RESOLVED | Noted: 2019-08-09 | Resolved: 2019-09-30

## 2019-09-30 PROCEDURE — 99214 OFFICE O/P EST MOD 30 MIN: CPT | Performed by: PHYSICIAN ASSISTANT

## 2019-09-30 PROCEDURE — 3008F BODY MASS INDEX DOCD: CPT | Performed by: PHYSICIAN ASSISTANT

## 2019-09-30 RX ORDER — COLESEVELAM 180 1/1
625 TABLET ORAL 2 TIMES DAILY WITH MEALS
Qty: 180 TABLET | Refills: 1 | Status: SHIPPED | OUTPATIENT
Start: 2019-09-30 | End: 2022-01-28 | Stop reason: ALTCHOICE

## 2019-09-30 RX ORDER — ERGOCALCIFEROL 1.25 MG/1
50000 CAPSULE ORAL WEEKLY
Qty: 4 CAPSULE | Refills: 0 | Status: SHIPPED | OUTPATIENT
Start: 2019-09-30 | End: 2022-01-28

## 2019-09-30 RX ORDER — ERGOCALCIFEROL 1.25 MG/1
50000 CAPSULE ORAL WEEKLY
Qty: 12 CAPSULE | Refills: 0 | Status: SHIPPED | OUTPATIENT
Start: 2019-09-30 | End: 2019-09-30 | Stop reason: SDUPTHER

## 2019-09-30 RX ORDER — CLARITHROMYCIN 250 MG/1
TABLET, FILM COATED ORAL EVERY 12 HOURS SCHEDULED
COMMUNITY
End: 2022-01-28

## 2019-09-30 NOTE — ASSESSMENT & PLAN NOTE
Due to chronic steroid use for Asthma/sinus diagnosis  Fasting glucose is 86 with a hemoglobin A1c of 6 5  I am starting pt on Welchol for her elevated LDL chol  Which should also help to bring her glucose numbers down  Recheck in 4 months

## 2019-09-30 NOTE — PROGRESS NOTES
Assessment and Plan:    Problem List Items Addressed This Visit        Digestive    Gastroesophageal reflux disease       Respiratory    Asthma     Continue with pulmonary f/u  Other    Multiple-type hyperlipidemia - Primary       Patient is intolerant to statin medication but LDL is elevated at 172  I will trial Welchol 625 mg twice daily with meals and recheck in 4 months  Relevant Medications    colesevelam (WELCHOL) 625 mg tablet    Other Relevant Orders    Lipid Panel with Direct LDL reflex    Long term (current) use of systemic steroids    Statin intolerance     Pt does not remember why this is a diagnosis or even being on a statin in the past           Vitamin D deficiency     Vitamin-D is 12 6  Patient needs 50,000 units once   Weekly and recheck in 4 months  Relevant Medications    ergocalciferol (VITAMIN D2) 50,000 units    Other Relevant Orders    Vitamin D 25 hydroxy    Hyperglycemia     Due to chronic steroid use for Asthma/sinus diagnosis  Fasting glucose is 86 with a hemoglobin A1c of 6 5  I am starting pt on Welchol for her elevated LDL chol  Which should also help to bring her glucose numbers down  Recheck in 4 months  Relevant Orders    Hemoglobin A1C    Comprehensive metabolic panel    Abnormal CBC     Repeat CBC WNL  Serum electrophoresis is still pending  Weight gain due to medication      Patient is experiencing weight gain secondary to steroid use for her medical conditions  She was prompted to undergo weight loss surgery in the past but I really do not think this is appropriate for this patient  Once patient is off steroids and feeling better after her surgery at the next visit if patient is still having problems with weight then I could possibly recommend medications  Patient did lose 25 lb at the beginning of this year before steroid use just by limiting her carbs                        Diagnoses and all orders for this visit:    Multiple-type hyperlipidemia  -     Lipid Panel with Direct LDL reflex; Future  -     colesevelam (WELCHOL) 625 mg tablet; Take 1 tablet (625 mg total) by mouth 2 (two) times a day with meals    Vitamin D deficiency  -     Discontinue: ergocalciferol (VITAMIN D2) 50,000 units; Take 1 capsule (50,000 Units total) by mouth once a week  -     Vitamin D 25 hydroxy; Future  -     ergocalciferol (VITAMIN D2) 50,000 units; Take 1 capsule (50,000 Units total) by mouth once a week    Hyperglycemia  -     Hemoglobin A1C; Future  -     Comprehensive metabolic panel; Future    Abnormal CBC    Gastroesophageal reflux disease, esophagitis presence not specified    Severe asthma, unspecified whether complicated, unspecified whether persistent    Long term (current) use of systemic steroids    Statin intolerance    Weight gain due to medication    Other orders  -     Cancel: metFORMIN (GLUCOPHAGE) 500 mg tablet; Take 1 tablet (500 mg total) by mouth daily with breakfast  -     clarithromycin (BIAXIN) 250 mg tablet; Take by mouth every 12 (twelve) hours              Subjective:      Patient ID: Jaspal Terrazas is a 53 y o  female  CC:    Chief Complaint   Patient presents with    Follow-up     Follow up and discuss lab results  kw       HPI:      Jaspal Terrazas is here for chronic conditions f/u including the diagnosis of Multiple-type hyperlipidemia  (primary encounter diagnosis)  Vitamin d deficiency  Hyperglycemia  Abnormal cbc  Gastroesophageal reflux disease, esophagitis presence not specified  Severe asthma, unspecified whether complicated, unspecified whether persistent  Long term (current) use of systemic steroids   Pt  states they are taking all medications as directed without complaints or side effects   Pt  had labs done prior to today's visit which included Recent Results (from the past 672 hour(s))  -Culture, tissue and Gram stain  Collection Time: 09/03/19  9:31 AM       Result Value             Ref Range           Tissue Culture                                                2+ Growth of - Mixed Oralpharyngeal milady including        Gram Stain Result                                             No Polys or Bacteria seen  -Tissue Exam  Collection Time: 09/03/19  9:57 AM       Result                      Value             Ref Range           Case Report                                                   Surgical Pathology Report                         Case: [id number]                                  Authorizing Provider:  Aneudy Roca DO          Collected:           09/03/2019 0957             Ordering Location:     [facility]        Received:            09/03/2019 [address] [place] [address] Operating Room                                                    Pathologist:           Phyllis Wall MD                                                             Specimens:   A) - Frontal Sinus, Left Sinus Contents                                                            B) - Frontal Sinus, Right Sinus Contents                                                     Final Diagnosis                                               A  Frontal Sinus, Left Sinus Contents: -Benign respiratory type mucosa with underlying bone showing stromal edema  and moderate chronic inflammation with eosinophilic infiltrate, consistent  with chronic sinusitis  B  Frontal Sinus, Right Sinus Contents: -Benign respiratory type mucosa with underlying bone showing stromal edema  and moderate chronic inflammation, consistent with chronic sinusitis   (FORMATTING REMOVED)       Note                                                          Interpretation performed at [address] ([address])       Additional Information                                        All controls performed with the immunohistochemical stains reported above  reacted appropriately  These tests were developed and their performance  characteristics determined by [facility] or  [address] [place], [place] [address]n [place]  They may not be cleared or approved by the [place]  Food and Drug Administration  The FDA has determined that such clearance  or approval is not necessary  These tests are used for clinical purposes  They should not be regarded as investigational or for research  This  laboratory has been approved by [facility], designated as a high-complexity  laboratory and is qualified to perform these tests  (FORMATTING REMOVED)       Gross Description                                             A  The specimen is received in formalin, labeled with the patient's name  and hospital number, and is designated "left sinus content"  The specimen  consists of multiple colorless to tan soft tissue fragments measuring in  aggregate of 2 5 x 0 9 x 0 3 cm  There are focal hard tissue fragments  After proper fixation the specimen is placed into DeCal   The specimen is  drained into an embedding bag  Entirely submitted  One cassette  B  The specimen is received in formalin, labeled with the patient's name  and hospital number, and is designated "right sinus contents"  The  specimen consists of multiple tan white rubbery soft tissue fragments  measuring in aggregate of 1 0 x 0 8 x 0 2 cm  The specimen is drained  into an embedding bag  Entirely submitted  One cassette  Note: The estimated total formalin fixation time based upon information  provided by the submitting clinician and the standard processing schedule  is under 72 hours   Danny    (FORMATTING REMOVED)  -Fingerstick Glucose (POCT)  Collection Time: 09/03/19 10:29 AM       Result                      Value             Ref Range           POC Glucose                 129               65 - 140 mg/*  -Comprehensive metabolic panel  Collection Time: 09/27/19  5:56 PM       Result                      Value             Ref Range           Sodium                      138               136 - 145 mm*       Potassium                   3 4 (L)           3 5 - 5 3 mm*       Chloride                    105               100 - 108 mm*       CO2                         26                21 - 32 mmol*       ANION GAP                   7                 4 - 13 mmol/L       BUN                         13                5 - 25 mg/dL        Creatinine                  0 55 (L)          0 60 - 1 30 *       Glucose, Fasting            86                65 - 99 mg/dL       Calcium                     9 6               8 3 - 10 1 m*       AST                         15                5 - 45 U/L          ALT                         27                12 - 78 U/L         Alkaline Phosphatase        73                46 - 116 U/L        Total Protein               7 6               6 4 - 8 2 g/*       Albumin                     3 9               3 5 - 5 0 g/*       Total Bilirubin             0 79              0 20 - 1 00 *       eGFR                        106               ml/min/1 73s*  -Hemoglobin A1C  Collection Time: 09/27/19  5:56 PM       Result                      Value             Ref Range           Hemoglobin A1C              6 5 (H)           4 2 - 6 3 %         EAG                         140               mg/dl          -Lipid Panel with Direct LDL reflex  Collection Time: 09/27/19  5:56 PM       Result                      Value             Ref Range           Cholesterol                 259 (H)           50 - 200 mg/*       Triglycerides               148               <=150 mg/dL         HDL, Direct                 57                40 - 60 mg/dL       LDL Calculated              172 (H)           0 - 100 mg/dL  -Vitamin D 25 hydroxy  Collection Time: 09/27/19  5:56 PM       Result                      Value             Ref Range           Vit D, 25-Hydroxy           12 6 (L)          30 0 - 100 0*  -CBC and differential  Collection Time: 09/27/19  5:56 PM       Result                      Value             Ref Range           WBC                         7 87              4 31 - 10 16*       RBC                         4 56              3 81 - 5 12 *       Hemoglobin                  13 3              11 5 - 15 4 *       Hematocrit                  41 3              34 8 - 46 1 %       MCV                         91                82 - 98 fL          MCH                         29 2              26 8 - 34 3 *       MCHC                        32 2              31 4 - 37 4 *       RDW                         13 2              11 6 - 15 1 %       MPV                         11 4              8 9 - 12 7 fL       Platelets                   293               149 - 390 Th*       nRBC                        0                 /100 WBCs           Neutrophils Relative        47                43 - 75 %           Immat GRANS %               0                 0 - 2 %             Lymphocytes Relative        41                14 - 44 %           Monocytes Relative          9                 4 - 12 %            Eosinophils Relative        2                 0 - 6 %             Basophils Relative          1                 0 - 1 %             Neutrophils Absolute        3 62              1 85 - 7 62 *       Immature Grans Absolute     0 03              0 00 - 0 20 *       Lymphocytes Absolute        3 21              0 60 - 4 47 *       Monocytes Absolute          0 73              0 17 - 1 22 *       Eosinophils Absolute        0 19              0 00 - 0 61 *       Basophils Absolute          0 09              0 00 - 0 10 *      Pt  does not remember why her allergy list has statin on it  She does not remember having a reaction or even being on a med for this in the past  She did have recent sinus surgery and is still having headaches         The following portions of the patient's history were reviewed and updated as appropriate: allergies, current medications, past family history, past medical history, past social history, past surgical history and problem list       Review of Systems   Constitutional: Negative  HENT: Negative  Eyes: Negative  Respiratory: Negative  Cardiovascular: Negative  Gastrointestinal: Negative  Endocrine: Negative  Genitourinary: Negative  Musculoskeletal: Negative  Skin: Negative  Allergic/Immunologic: Negative  Neurological: Negative  Hematological: Negative  Psychiatric/Behavioral: Negative  Data to review:       Objective:    Vitals:    09/30/19 1104   BP: 106/72   BP Location: Left arm   Patient Position: Sitting   Pulse: 72   Weight: 88 2 kg (194 lb 6 4 oz)   Height: 5' 2" (1 575 m)        Physical Exam   Constitutional: She is oriented to person, place, and time  She appears well-developed and well-nourished  No distress  HENT:   Head: Normocephalic and atraumatic  Eyes: Conjunctivae are normal  Right eye exhibits no discharge  Left eye exhibits no discharge  Neck: Neck supple  Carotid bruit is not present  Cardiovascular: Normal rate, regular rhythm and normal heart sounds  Exam reveals no gallop and no friction rub  No murmur heard  Pulmonary/Chest: Effort normal and breath sounds normal  No respiratory distress  She has no wheezes  She has no rales  Neurological: She is alert and oriented to person, place, and time  Skin: Skin is warm and dry  She is not diaphoretic  Psychiatric: She has a normal mood and affect  Judgment normal    Nursing note and vitals reviewed

## 2019-09-30 NOTE — ASSESSMENT & PLAN NOTE
Patient is intolerant to statin medication but LDL is elevated at 172  I will trial Welchol 625 mg twice daily with meals and recheck in 4 months

## 2019-09-30 NOTE — ASSESSMENT & PLAN NOTE
Patient is experiencing weight gain secondary to steroid use for her medical conditions  She was prompted to undergo weight loss surgery in the past but I really do not think this is appropriate for this patient  Once patient is off steroids and feeling better after her surgery at the next visit if patient is still having problems with weight then I could possibly recommend medications  Patient did lose 25 lb at the beginning of this year before steroid use just by limiting her carbs

## 2019-09-30 NOTE — PATIENT INSTRUCTIONS
Problem List Items Addressed This Visit        Digestive    Gastroesophageal reflux disease       Respiratory    Asthma     Continue with pulmonary f/u  Other    Multiple-type hyperlipidemia - Primary       Patient is intolerant to statin medication but LDL is elevated at 172  I will trial Welchol 625 mg twice daily with meals and recheck in 4 months  Relevant Medications    colesevelam (WELCHOL) 625 mg tablet    Other Relevant Orders    Lipid Panel with Direct LDL reflex    Long term (current) use of systemic steroids    Statin intolerance     Pt does not remember why this is a diagnosis or even being on a statin in the past           Vitamin D deficiency     Vitamin-D is 12 6  Patient needs 50,000 units once   Weekly and recheck in 4 months  Relevant Medications    ergocalciferol (VITAMIN D2) 50,000 units    Other Relevant Orders    Vitamin D 25 hydroxy    Hyperglycemia     Due to chronic steroid use for Asthma/sinus diagnosis  Fasting glucose is 86 with a hemoglobin A1c of 6 5  I am starting pt on Welchol for her elevated LDL chol  Which should also help to bring her glucose numbers down  Recheck in 4 months  Relevant Orders    Hemoglobin A1C    Comprehensive metabolic panel    Abnormal CBC     Repeat CBC WNL  Serum electrophoresis is still pending  Hyperglycemia, Non-Diabetic   WHAT YOU NEED TO KNOW:   What is hyperglycemia? Hyperglycemia is a blood glucose (sugar) level that is higher than normal  Hyperglycemia can be short-term, or it can become a long-term condition that leads to diabetes  What increases my risk for hyperglycemia?    · Medical problems, such as a stroke or heart attack, or pancreatitis (inflammation of your pancreas)     · Trauma, such as a burn or injury    · Infections, such as pneumonia or a urinary tract infection     · Medicines, such as steroids and diuretics, or illegal drugs, such as cocaine and ecstasy     · Surgery · Polycystic ovary syndrome (PCOS) or a history of gestational diabetes     · Family history of diabetes     · Obesity or a sedentary lifestyle  What are the signs and symptoms of hyperglycemia? · More thirst than usual     · Frequent urination     · Weight loss without trying     · Blurred vision     · Nausea and vomiting     · Abdominal pain  How is hyperglycemia diagnosed? · A random blood glucose test  may be done at any time of day to test the amount of sugar in your blood  · A fasting plasma glucose  tests the amount of sugar in your blood after you have fasted for 8 hours  · An oral glucose tolerance test  checks how much your blood sugar level increases over a few hours  After you have fasted for 8 hours, you are given a glucose drink  Your blood sugar level is checked after 1 hour and again 2 hours after you drink the glucose  Healthcare providers look at how much your blood sugar level increases from the first check  · An A1c test  shows the average amount of sugar in your blood over the past 2 to 3 months  How is hyperglycemia treated? Treatment depends on your blood sugar level  You may need any of the following:  · Hypoglycemic medicine  helps to decrease the amount of sugar in your blood  This medicine helps your body move the sugar to your cells, where it is needed for energy  Your healthcare provider will tell you how often to take this medicine and how long to take it  · Insulin  helps to decrease the amount of sugar in your blood  You may need 1 or more shots of insulin each day  You or a family member will be taught how to give the insulin shots  Your healthcare provider will tell you how often you need to inject insulin each day  He will also tell you how long you will need to take it  What are the risks of hyperglycemia? · Treatment may cause your blood sugar level to become too low  The levels of your electrolytes (minerals) may become too high or too low   For example, your potassium level may decrease  · Without treatment, high blood sugar levels can lead to severe dehydration  If you have surgery, you may develop an infection in your surgery wound, or it may not heal well  You may get a blood clot in your leg or arm  The clot may travel to your heart or brain and cause life-threatening problems, such as a heart attack or stroke  Hyperglycemia may cause pancreatitis  Hyperglycemia can also lead to diabetes  Hyperglycemia can damage your nerves, veins, arteries, and organs over time  Damage to arteries may increase your risk for a heart attack or stroke  How can I manage my hyperglycemia? Ask your healthcare provider about these and other ways to help lower your blood sugar level or keep it steady:  · Exercise regularly  This can help to lower your blood sugar levels  It can also improve your heart health and help you stay at a healthy weight  Get at least 30 minutes of exercise 5 days each week  Ask your healthcare provider about the best exercise plan for you  · Lose weight if you are overweight  Even a small loss of 5% to 10% of your body weight can help to decrease your blood sugar levels  Weight loss can also improve your heart health  · Eat healthy foods  Include foods that are high in fiber, such as vegetables, fruits, whole grains, and beans  Also include foods that are low in fat, such as low-fat dairy (milk, yogurt, and cheese), fish, and lean meat  Limit foods that are high in calories and sugar, such as sweet desserts, potato chips, and candy  Limit foods that are high in sodium, such as table salt and salty foods  Your healthcare provider may suggest that you limit carbohydrates to lower your blood sugar levels  · Do not smoke  If you smoke, it is never too late to quit  Smoking can worsen the problems that can occur with hyperglycemia  Ask your healthcare provider for information if you need help quitting  · Limit alcohol    Women should limit alcohol to 1 drink a day  Men should limit alcohol to 2 drinks a day  A drink of alcohol is 12 ounces of beer, 5 ounces of wine, or 1½ ounces of liquor  Do I need to check my blood sugar level? You may need to check your blood sugar level with a blood glucose meter  If you take insulin, you may need to check your blood sugar level at least 3 times each day  Ask your healthcare provider when and how often to check during the day  Ask what your blood sugar levels should be before and after you eat  You may need to check for ketones in your urine if your blood sugar level is high  Write down your results and show them to your healthcare provider  When should I contact my healthcare provider? · You have a fever  · Your blood sugar levels continue to be higher than you were told they should be  · You continue to urinate more often than usual      · You continue to be more thirsty than usual      · You continue to have nausea and vomiting  · You have a wound that has signs of infection, such as redness, swelling, and pus  · You have questions or concerns about your condition or care  When should I seek immediate care or call 911? · Your arm or leg feels warm, tender, and painful  It may look swollen and red  · You feel lightheaded, short of breath, and have chest pain  · You cough up blood  CARE AGREEMENT:   You have the right to help plan your care  Learn about your health condition and how it may be treated  Discuss treatment options with your caregivers to decide what care you want to receive  You always have the right to refuse treatment  The above information is an  only  It is not intended as medical advice for individual conditions or treatments  Talk to your doctor, nurse or pharmacist before following any medical regimen to see if it is safe and effective for you    © 2017 [facility] Information is for End User's use only and may not be sold, redistributed or otherwise used for commercial purposes  All illustrations and images included in CareNotes® are the copyrighted property of [employer] M [employer]  or Dieudonne Hernández  Hyperlipidemia   WHAT YOU NEED TO KNOW:   What is hyperlipidemia? Hyperlipidemia is a high level of lipids (fats) in your blood  These lipids include cholesterol or triglycerides  Lipids are made by your body  They also come from the foods you eat  Your body needs lipids to work properly, but high levels increase your risk for heart disease, heart attack, and stroke  What increases my risk for hyperlipidemia? · Family history of high lipid levels    · Diet high in saturated fats, cholesterol, or calories     · High alcohol intake or smoking    · Lack of regular physical activity    · Medical conditions such as hypothyroidism, obesity, or type 2 diabetes    · Certain medicines, such as blood pressure medicines, hormones, and steroids  How is hyperlipidemia managed and treated? Your healthcare provider may first recommend that you make lifestyle changes to help decrease your lipid levels  You may also need to take medicine to lower your lipid levels  Some of the lifestyle changes you may need to make include the following:  · Maintain a healthy weight  Ask your healthcare provider how much you should weigh  Ask him or her to help you create a weight loss plan if you are overweight  Weight loss can decrease your cholesterol and triglyceride levels  · Exercise as directed  Exercise lowers your cholesterol levels and helps you maintain a healthy weight  Get 40 minutes or more of moderate exercise 3 to 4 days each week  You can split your exercise into four 10-minute workouts instead of 40 minutes at one time  Examples of moderate exercises include walking briskly, swimming, or riding a bike  Work with your healthcare provider to plan the best exercise program for you  · Do not smoke    Nicotine and other chemicals in cigarettes and cigars can increase your risk for a heart attack and stroke  Ask your healthcare provider for information if you currently smoke and need help to quit  E-cigarettes or smokeless tobacco still contain nicotine  Talk to your healthcare provider before you use these products  · Eat heart-healthy foods  Talk to your dietitian about a heart-healthy diet  The following will help you manage hyperlipidemia:     ¨ Decrease the total amount of fat you eat  Choose lean meats, fat-free or 1% fat milk, and low-fat dairy products, such as yogurt and cheese  Limit or do not eat red meat  Red meats are high in fat and cholesterol  ¨ Replace unhealthy fats with healthy fats  Unhealthy fats include saturated fat, trans fat, and cholesterol  Choose soft margarines that are low in saturated fat and have little or no trans fat  Monounsaturated fats are healthy fats  These are found in olive oil, canola oil, avocado, and nuts  Polyunsaturated fats are also healthy  These are found in fish, flaxseed, walnuts, and soybeans  ¨ Eat fruits and vegetables every day  They are low in calories and fat and a good source of essential vitamins  Include dark green, red, and orange vegetables  Examples include spinach, kale, broccoli, and carrots  ¨ Eat foods high in fiber  Choose whole grain, high-fiber foods  Good choices include whole-wheat breads or cereals, beans, peas, fruits, and vegetables  · Ask your healthcare provider if it is safe for you to drink alcohol  Alcohol can increase your cholesterol and triglyceride levels  A drink of alcohol is 12 ounces of beer, 5 ounces of wine, or 1½ ounces of liquor    Call 911 for any of the following:   · You have any of the following signs of a heart attack:      ¨ Squeezing, pressure, or pain in your chest that lasts longer than 5 minutes or returns    ¨ Discomfort or pain in your back, neck, jaw, stomach, or arm     ¨ Trouble breathing    ¨ Nausea or vomiting    ¨ Lightheadedness or a sudden cold sweat, especially with chest pain or trouble breathing    · You have any of the following signs of a stroke:      ¨ Numbness or drooping on one side of your face     ¨ Weakness in an arm or leg    ¨ Confusion or difficulty speaking    ¨ Dizziness, a severe headache, or vision loss  When should I contact my healthcare provider? · You have questions or concerns about your condition or care  CARE AGREEMENT:   You have the right to help plan your care  Learn about your health condition and how it may be treated  Discuss treatment options with your caregivers to decide what care you want to receive  You always have the right to refuse treatment  The above information is an  only  It is not intended as medical advice for individual conditions or treatments  Talk to your doctor, nurse or pharmacist before following any medical regimen to see if it is safe and effective for you  © 2017 [address] Information is for End User's use only and may not be sold, redistributed or otherwise used for commercial purposes  All illustrations and images included in CareNotes® are the copyrighted property of A [employer] A M [employer] Inc  or Dieudonne Hernández

## 2019-10-01 LAB
ALBUMIN SERPL ELPH-MCNC: 4.28 G/DL (ref 3.5–5)
ALBUMIN SERPL ELPH-MCNC: 57 % (ref 52–65)
ALPHA1 GLOB SERPL ELPH-MCNC: 0.29 G/DL (ref 0.1–0.4)
ALPHA1 GLOB SERPL ELPH-MCNC: 3.9 % (ref 2.5–5)
ALPHA2 GLOB SERPL ELPH-MCNC: 0.86 G/DL (ref 0.4–1.2)
ALPHA2 GLOB SERPL ELPH-MCNC: 11.4 % (ref 7–13)
BETA GLOB ABNORMAL SERPL ELPH-MCNC: 0.47 G/DL (ref 0.4–0.8)
BETA1 GLOB SERPL ELPH-MCNC: 6.2 % (ref 5–13)
BETA2 GLOB SERPL ELPH-MCNC: 7.1 % (ref 2–8)
BETA2+GAMMA GLOB SERPL ELPH-MCNC: 0.53 G/DL (ref 0.2–0.5)
GAMMA GLOB ABNORMAL SERPL ELPH-MCNC: 1.08 G/DL (ref 0.5–1.6)
GAMMA GLOB SERPL ELPH-MCNC: 14.4 % (ref 12–22)
IGG/ALB SER: 1.33 {RATIO} (ref 1.1–1.8)
INTERPRETATION UR IFE-IMP: NORMAL
M PROTEIN 1 MFR SERPL ELPH: 3.3 %
M PROTEIN 1 SERPL ELPH-MCNC: 0.25 G/DL
PROT PATTERN SERPL ELPH-IMP: ABNORMAL
PROT SERPL-MCNC: 7.5 G/DL (ref 6.4–8.2)

## 2019-10-03 DIAGNOSIS — D47.2 IGG GAMMOPATHY: Primary | ICD-10-CM

## 2019-11-18 ENCOUNTER — TELEPHONE (OUTPATIENT)
Dept: HEMATOLOGY ONCOLOGY | Facility: CLINIC | Age: 54
End: 2019-11-18

## 2019-11-18 NOTE — TELEPHONE ENCOUNTER
Patient called to cancel her 11/19 Consult with Dr Orquidea Ortiz due to a set back with her insurance  She stated that she will call back to reschedule

## 2020-01-24 ENCOUNTER — OFFICE VISIT (OUTPATIENT)
Dept: FAMILY MEDICINE CLINIC | Facility: CLINIC | Age: 55
End: 2020-01-24
Payer: COMMERCIAL

## 2020-01-24 VITALS
TEMPERATURE: 97.1 F | HEIGHT: 62 IN | WEIGHT: 203 LBS | DIASTOLIC BLOOD PRESSURE: 76 MMHG | BODY MASS INDEX: 37.36 KG/M2 | SYSTOLIC BLOOD PRESSURE: 120 MMHG | HEART RATE: 86 BPM

## 2020-01-24 DIAGNOSIS — J06.9 UPPER RESPIRATORY TRACT INFECTION, UNSPECIFIED TYPE: ICD-10-CM

## 2020-01-24 DIAGNOSIS — J32.1 FRONTAL SINUSITIS, UNSPECIFIED CHRONICITY: ICD-10-CM

## 2020-01-24 DIAGNOSIS — J30.9 ALLERGIC RHINITIS, UNSPECIFIED SEASONALITY, UNSPECIFIED TRIGGER: ICD-10-CM

## 2020-01-24 DIAGNOSIS — Z11.4 SCREENING FOR HIV (HUMAN IMMUNODEFICIENCY VIRUS): Primary | ICD-10-CM

## 2020-01-24 DIAGNOSIS — J45.909 SEVERE ASTHMA, UNSPECIFIED WHETHER COMPLICATED, UNSPECIFIED WHETHER PERSISTENT: ICD-10-CM

## 2020-01-24 DIAGNOSIS — Z01.419 ENCOUNTER FOR GYNECOLOGICAL EXAMINATION: ICD-10-CM

## 2020-01-24 PROCEDURE — 99214 OFFICE O/P EST MOD 30 MIN: CPT | Performed by: PHYSICIAN ASSISTANT

## 2020-01-24 PROCEDURE — 1036F TOBACCO NON-USER: CPT | Performed by: PHYSICIAN ASSISTANT

## 2020-01-24 PROCEDURE — 3008F BODY MASS INDEX DOCD: CPT | Performed by: PHYSICIAN ASSISTANT

## 2020-01-24 RX ORDER — DOXYCYCLINE 100 MG/1
100 TABLET ORAL 2 TIMES DAILY
Qty: 20 TABLET | Refills: 0 | Status: SHIPPED | OUTPATIENT
Start: 2020-01-24 | End: 2020-02-03

## 2020-01-24 RX ORDER — FLUTICASONE PROPIONATE 50 MCG
1 SPRAY, SUSPENSION (ML) NASAL DAILY
Qty: 3 BOTTLE | Refills: 3 | Status: SHIPPED | OUTPATIENT
Start: 2020-01-24 | End: 2022-01-28 | Stop reason: SDUPTHER

## 2020-01-24 RX ORDER — ATORVASTATIN CALCIUM 20 MG/1
20 TABLET, FILM COATED ORAL DAILY
COMMUNITY
Start: 2019-01-04 | End: 2022-01-28 | Stop reason: ALTCHOICE

## 2020-01-24 NOTE — PATIENT INSTRUCTIONS
Problem List Items Addressed This Visit        Respiratory    Asthma     Currently exacerbated  Patient does see an allergist and is on a plethora of oral and inhalant with neb  Continue all as directed  Atopic rhinitis       Restart Flonase  Refills given  Relevant Medications    fluticasone (FLONASE) 50 mcg/act nasal spray    Frontal sinusitis       Antibiotic as directed  Patient is status post surgery in September with Dr Carla Schaffer under screw  She did have quite a bit of complications with remaining clot headaches and continued left-sided congestion  Patient to make an appointment with her old ENT who is now back on her insurance list   Patient may want to consider generic Mucinex D as directed  Relevant Medications    doxycycline (ADOXA) 100 MG tablet    fluticasone (FLONASE) 50 mcg/act nasal spray    Upper respiratory tract infection       Doxycycline as directed  Increase fluids           Relevant Medications    doxycycline (ADOXA) 100 MG tablet      Other Visit Diagnoses     Screening for HIV (human immunodeficiency virus)    -  Primary    Relevant Orders    [facility] HIV 1/2 AG-AB combo    Encounter for gynecological examination        Relevant Orders    Ambulatory referral to Obstetrics / Gynecology

## 2020-01-24 NOTE — ASSESSMENT & PLAN NOTE
Antibiotic as directed  Patient is status post surgery in September with Dr Young Reveal under screw  She did have quite a bit of complications with remaining clot headaches and continued left-sided congestion  Patient to make an appointment with her old ENT who is now back on her insurance list   Patient may want to consider generic Mucinex D as directed

## 2020-01-24 NOTE — PROGRESS NOTES
Assessment and Plan:    Problem List Items Addressed This Visit        Respiratory    Asthma     Currently exacerbated  Patient does see an allergist and is on a plethora of oral and inhalant with neb  Continue all as directed  Atopic rhinitis       Restart Flonase  Refills given  Relevant Medications    fluticasone (FLONASE) 50 mcg/act nasal spray    Frontal sinusitis       Antibiotic as directed  Patient is status post surgery in September with Dr Otto Gustafson under screw  She did have quite a bit of complications with remaining clot headaches and continued left-sided congestion  Patient to make an appointment with her old ENT who is now back on her insurance list   Patient may want to consider generic Mucinex D as directed  Relevant Medications    doxycycline (ADOXA) 100 MG tablet    fluticasone (FLONASE) 50 mcg/act nasal spray    Upper respiratory tract infection       Doxycycline as directed  Increase fluids  Relevant Medications    doxycycline (ADOXA) 100 MG tablet      Other Visit Diagnoses     Screening for HIV (human immunodeficiency virus)    -  Primary    Relevant Orders    [facility] HIV 1/2 AG-AB combo    Encounter for gynecological examination        Relevant Orders    Ambulatory referral to Obstetrics / Gynecology                 Diagnoses and all orders for this visit:    Screening for HIV (human immunodeficiency virus)  -     [facility] HIV 1/2 AG-AB combo; Future    Upper respiratory tract infection, unspecified type  -     doxycycline (ADOXA) 100 MG tablet; Take 1 tablet (100 mg total) by mouth 2 (two) times a day for 10 days    Frontal sinusitis, unspecified chronicity  -     doxycycline (ADOXA) 100 MG tablet;  Take 1 tablet (100 mg total) by mouth 2 (two) times a day for 10 days  -     fluticasone (FLONASE) 50 mcg/act nasal spray; 1 spray into each nostril daily    Allergic rhinitis, unspecified seasonality, unspecified trigger  -     fluticasone (FLONASE) 50 mcg/act nasal spray; 1 spray into each nostril daily    Severe asthma, unspecified whether complicated, unspecified whether persistent    Encounter for gynecological examination  -     Ambulatory referral to Obstetrics / Gynecology; Future    Other orders  -     atorvastatin (LIPITOR) 20 mg tablet; Take 20 mg by mouth daily              Subjective:      Patient ID: Reji Navas is a 53 y o  female  CC:    Chief Complaint   Patient presents with    Sinus infection       HPI:    Patient here today because she in the past 3-4 days has been experiencing more than usual head congestion ear pain bilaterally nasal congestion postnasal drip cough and wheezing despite doing all of her multiple nebulizers and inhalers and oral medications and injections  She does have an allergist   She did have sinus surgery in September which had a lot of complications with clotting and continued swelling  She does not like Dr Joey hurtado and would never go back and she was only there because of her insurance  Her insurance changed this year now she can go back to her normal ENT which she did see the PA were advanced provider on Monday and they did absolutely nothing for her  She does do nebulizers regularly and did 1 before coming in today  She is not experiencing any fever nausea vomiting or diarrhea  She was told that she would stop all of her nasal inhalers for her surgery and then asked if she could restart them but was never told the answer on Monday even know the provider said that they were going to check with that doctor  Mucus is colored and copious  The following portions of the patient's history were reviewed and updated as appropriate: allergies, current medications, past family history, past medical history, past social history, past surgical history and problem list       Review of Systems   Constitutional: Negative      HENT: Positive for congestion, ear pain, rhinorrhea, sinus pressure and sinus pain  Eyes: Negative  Respiratory: Positive for cough, chest tightness, shortness of breath and wheezing  Cardiovascular: Negative  Gastrointestinal: Negative  Endocrine: Negative  Genitourinary: Negative  Musculoskeletal: Negative  Skin: Negative  Allergic/Immunologic: Negative  Neurological: Negative  Hematological: Negative  Psychiatric/Behavioral: Negative  Data to review:       Objective:    Vitals:    01/24/20 0958   BP: 120/76   BP Location: Left arm   Patient Position: Sitting   Cuff Size: Adult   Pulse: 86   Temp: (!) 97 1 °F (36 2 °C)   TempSrc: Tympanic   Weight: 92 1 kg (203 lb)   Height: 5' 2" (1 575 m)        Physical Exam   Constitutional: She is oriented to person, place, and time  She appears well-developed and well-nourished  No distress  HENT:   Head: Normocephalic and atraumatic  Right Ear: Hearing, external ear and ear canal normal  Tympanic membrane is erythematous  A middle ear effusion is present  Left Ear: Hearing, external ear and ear canal normal  Tympanic membrane is erythematous  A middle ear effusion is present  Nose: Mucosal edema and rhinorrhea present  Mouth/Throat: Uvula is midline  Posterior oropharyngeal edema and posterior oropharyngeal erythema present  Patient has bilateral retracted serous erythematous TMs  Bilateral nasal turbinates extremely edematous and erythematous more so on the left than the right  In passable and tight  Eyes: Conjunctivae are normal  Right eye exhibits no discharge  Left eye exhibits no discharge  Neck: Neck supple  Carotid bruit is not present  Cardiovascular: Normal rate, regular rhythm and normal heart sounds  Exam reveals no gallop and no friction rub  No murmur heard  Pulmonary/Chest: Effort normal  No respiratory distress   She has wheezes in the right upper field, the right middle field, the right lower field, the left upper field, the left middle field and the left lower field  She has no rales  End expiratory wheeze all lung fields posteriorly  Lymphadenopathy:     She has cervical adenopathy  Right cervical: Superficial cervical adenopathy present  Left cervical: Superficial cervical adenopathy present  Neurological: She is alert and oriented to person, place, and time  Skin: Skin is warm and dry  She is not diaphoretic  Psychiatric: She has a normal mood and affect  Judgment normal    Nursing note and vitals reviewed

## 2020-01-24 NOTE — ASSESSMENT & PLAN NOTE
Currently exacerbated  Patient does see an allergist and is on a plethora of oral and inhalant with neb  Continue all as directed

## 2020-08-26 ENCOUNTER — TELEPHONE (OUTPATIENT)
Dept: FAMILY MEDICINE CLINIC | Facility: CLINIC | Age: 55
End: 2020-08-26

## 2020-08-26 NOTE — TELEPHONE ENCOUNTER
On 4/22/2020 patient established care with Kaylin Green, [facility] [address], [address] [address]   [phone] (Fax)

## 2020-09-08 NOTE — TELEPHONE ENCOUNTER
09/08/20 3:54 PM     Thank you for your request  Your request has been received, reviewed, and the patient chart updated  The PCP has successfully been removed with a patient attribution note  This message will now be completed      Thank you  Elham Goldman

## 2020-12-04 DIAGNOSIS — R73.9 HYPERGLYCEMIA: ICD-10-CM

## 2020-12-04 DIAGNOSIS — R79.89 ABNORMAL CBC: ICD-10-CM

## 2020-12-04 DIAGNOSIS — E55.9 VITAMIN D DEFICIENCY: Primary | ICD-10-CM

## 2020-12-04 DIAGNOSIS — E78.2 MULTIPLE-TYPE HYPERLIPIDEMIA: ICD-10-CM

## 2020-12-04 RX ORDER — COLESEVELAM 180 1/1
TABLET ORAL
Qty: 180 TABLET | Refills: 1 | OUTPATIENT
Start: 2020-12-04

## 2020-12-04 NOTE — TELEPHONE ENCOUNTER
Patient has not had blood work done in over 1 year and needs blood work and office visit before refills  I did place orders for blood work

## 2021-03-10 DIAGNOSIS — Z23 ENCOUNTER FOR IMMUNIZATION: ICD-10-CM

## 2022-01-27 PROBLEM — J06.9 UPPER RESPIRATORY TRACT INFECTION: Status: RESOLVED | Noted: 2020-01-24 | Resolved: 2022-01-27

## 2022-01-27 PROBLEM — J32.1 FRONTAL SINUSITIS: Status: RESOLVED | Noted: 2020-01-24 | Resolved: 2022-01-27

## 2022-01-27 PROBLEM — I82.401 ACUTE DEEP VEIN THROMBOSIS (DVT) OF RIGHT LOWER EXTREMITY (HCC): Status: ACTIVE | Noted: 2020-07-09

## 2022-01-28 ENCOUNTER — OFFICE VISIT (OUTPATIENT)
Dept: FAMILY MEDICINE CLINIC | Facility: CLINIC | Age: 57
End: 2022-01-28
Payer: COMMERCIAL

## 2022-01-28 VITALS
WEIGHT: 208 LBS | BODY MASS INDEX: 36.86 KG/M2 | HEART RATE: 96 BPM | HEIGHT: 63 IN | DIASTOLIC BLOOD PRESSURE: 82 MMHG | SYSTOLIC BLOOD PRESSURE: 120 MMHG | TEMPERATURE: 97.6 F

## 2022-01-28 DIAGNOSIS — E78.2 MULTIPLE-TYPE HYPERLIPIDEMIA: ICD-10-CM

## 2022-01-28 DIAGNOSIS — J32.1 FRONTAL SINUSITIS, UNSPECIFIED CHRONICITY: ICD-10-CM

## 2022-01-28 DIAGNOSIS — Z00.00 HEALTHCARE MAINTENANCE: ICD-10-CM

## 2022-01-28 DIAGNOSIS — Z11.4 SCREENING FOR HIV (HUMAN IMMUNODEFICIENCY VIRUS): ICD-10-CM

## 2022-01-28 DIAGNOSIS — Z12.31 ENCOUNTER FOR SCREENING MAMMOGRAM FOR MALIGNANT NEOPLASM OF BREAST: ICD-10-CM

## 2022-01-28 DIAGNOSIS — Z11.59 NEED FOR HEPATITIS C SCREENING TEST: ICD-10-CM

## 2022-01-28 DIAGNOSIS — J30.9 ALLERGIC RHINITIS, UNSPECIFIED SEASONALITY, UNSPECIFIED TRIGGER: ICD-10-CM

## 2022-01-28 DIAGNOSIS — R68.89 HEAT INTOLERANCE: ICD-10-CM

## 2022-01-28 DIAGNOSIS — K21.9 GASTROESOPHAGEAL REFLUX DISEASE, UNSPECIFIED WHETHER ESOPHAGITIS PRESENT: Primary | ICD-10-CM

## 2022-01-28 DIAGNOSIS — R73.9 HYPERGLYCEMIA: ICD-10-CM

## 2022-01-28 DIAGNOSIS — K22.70 BARRETT'S ESOPHAGUS WITH ESOPHAGITIS: ICD-10-CM

## 2022-01-28 DIAGNOSIS — Z91.018 ALLERGY TO PINEAPPLE: ICD-10-CM

## 2022-01-28 DIAGNOSIS — M81.0 AGE-RELATED OSTEOPOROSIS WITHOUT CURRENT PATHOLOGICAL FRACTURE: ICD-10-CM

## 2022-01-28 DIAGNOSIS — E55.9 VITAMIN D DEFICIENCY: ICD-10-CM

## 2022-01-28 DIAGNOSIS — J45.909 SEVERE ASTHMA, UNSPECIFIED WHETHER COMPLICATED, UNSPECIFIED WHETHER PERSISTENT: ICD-10-CM

## 2022-01-28 DIAGNOSIS — K20.90 BARRETT'S ESOPHAGUS WITH ESOPHAGITIS: ICD-10-CM

## 2022-01-28 PROCEDURE — 99214 OFFICE O/P EST MOD 30 MIN: CPT | Performed by: PHYSICIAN ASSISTANT

## 2022-01-28 PROCEDURE — 3725F SCREEN DEPRESSION PERFORMED: CPT | Performed by: PHYSICIAN ASSISTANT

## 2022-01-28 PROCEDURE — 3008F BODY MASS INDEX DOCD: CPT | Performed by: PHYSICIAN ASSISTANT

## 2022-01-28 PROCEDURE — 1036F TOBACCO NON-USER: CPT | Performed by: PHYSICIAN ASSISTANT

## 2022-01-28 RX ORDER — FEXOFENADINE HCL 180 MG/1
180 TABLET ORAL DAILY
Qty: 90 TABLET | Refills: 3 | Status: SHIPPED | OUTPATIENT
Start: 2022-01-28

## 2022-01-28 RX ORDER — ALENDRONATE SODIUM 70 MG/1
70 TABLET ORAL WEEKLY
COMMUNITY
Start: 2021-12-20 | End: 2022-01-28 | Stop reason: ALTCHOICE

## 2022-01-28 RX ORDER — EPINEPHRINE 0.3 MG/.3ML
0.3 INJECTION SUBCUTANEOUS ONCE
Qty: 2 EACH | Refills: 3 | Status: SHIPPED | OUTPATIENT
Start: 2022-01-28 | End: 2022-07-08

## 2022-01-28 RX ORDER — FLUTICASONE PROPIONATE 50 MCG
1 SPRAY, SUSPENSION (ML) NASAL DAILY
Qty: 16 G | Refills: 11 | Status: SHIPPED | OUTPATIENT
Start: 2022-01-28

## 2022-01-28 RX ORDER — METHYLPREDNISOLONE 4 MG/1
4 TABLET ORAL DAILY
Start: 2022-01-28

## 2022-01-28 NOTE — ASSESSMENT & PLAN NOTE
Sees gastro on PPI daily  Meena Smiley wants pt to talk to PCP to loose weight  IF she is DM than she will be referred to [facility]

## 2022-01-28 NOTE — PATIENT INSTRUCTIONS
Problem List Items Addressed This Visit        Digestive    Gastroesophageal reflux disease - Primary    Banks's esophagus with esophagitis     Stable with PPI daily  Has gastro  S/p EGD  Relevant Orders    CBC and differential       Respiratory    Asthma     Sees asthma and allergist and is now on Dupixant every two weeks, jarrett sanchez Marceil Spillers  Pt  is still on methylprednisone every other day  Relevant Medications    methylprednisolone (MEDROL) 4 mg tablet    Atopic rhinitis     Sees asthma and allergist and is now on Dupixant every two weeks, jarrett sanchez Marceil Spillers  Relevant Medications    fexofenadine (ALLEGRA) 180 MG tablet    fluticasone (FLONASE) 50 mcg/act nasal spray    methylprednisolone (MEDROL) 4 mg tablet       Musculoskeletal and Integument    Age-related osteoporosis without current pathological fracture     DEXA done 7/30/2021 with other PCP and showed new onset osteoporosis  Will refer to endo  1,200 mg calcium daily (600 twice aday) Check D  And phos levels  Relevant Orders    Phosphorus    PTH, intact    Ambulatory referral to Endocrinology       Other    Multiple-type hyperlipidemia    Relevant Orders    Lipid Panel with Direct LDL reflex    Vitamin D deficiency     Check vitamin-D  Relevant Orders    Vitamin D 25 hydroxy    Hyperglycemia     Patient due for fasting blood work will order A1c and CMP in July of 2021 her A1c was 6 9 with a fasting sugar of 96 which technically needed she should be diabetic  Will await blood work results to determine  Relevant Orders    Comprehensive metabolic panel    Hemoglobin A1C    Allergy to pineapple     Refill of epipen  Relevant Medications    EPINEPHrine (EPIPEN) 0 3 mg/0 3 mL SOAJ    Heat intolerance     Check TSH  Consider black cohosh if normal  Also could be SE from prednisone            Relevant Orders    TSH, 3rd generation      Other Visit Diagnoses     Healthcare maintenance Relevant Orders    Ambulatory referral to Obstetrics / Gynecology    Encounter for screening mammogram for malignant neoplasm of breast        Relevant Orders    Mammo screening bilateral w 3d & cad    Frontal sinusitis, unspecified chronicity        Relevant Medications    fluticasone (FLONASE) 50 mcg/act nasal spray    Need for hepatitis C screening test        Relevant Orders    Hepatitis C Antibody ([facility])    Screening for HIV (human immunodeficiency virus)        Relevant Orders    HIV 1/2 Antigen/Antibody (4th Generation) w Reflex [facility]          Osteoporosis Education   Osteoporosis  is a long-term medical condition that causes your bones to become weak, brittle, and more likely to fracture  Osteoporosis occurs when your body absorbs more bone than it makes  It is also caused by a lack of calcium and estrogen (female hormone)  Common symptoms include the following: You may not have any signs or symptoms  You may break a bone after a muscle strain, bump, or fall  A break usually occurs in the hip, spine, or wrist  A collapsed vertebra (bone in your spine) may cause severe back pain or loss of height from bent posture  Call your doctor if:   ·  You have severe pain  ·  You have increasing pain after a fall  ·  You have pain when you do your daily activities  ·  You have questions or concerns about your condition or care  Diagnosis of osteoporosis:   · Blood and urine tests  measure your calcium, vitamin D, and estrogen levels  · An x-ray or CT may show thinned bones or a fracture  You may be given contrast liquid to help the bones show up better in the pictures  Tell the healthcare provider if you have ever had an allergic reaction to contrast liquid  Do not enter the MRI room with anything metal  Metal can cause serious injury  Tell the healthcare provider if you have any metal in or on your body      · A bone density test  compares your bone thickness with what is expected for someone of your age, gender, and ethnicity  Treatment for osteoporosis may include medicines to prevent bone loss, build new bone, and increase estrogen  These medicines help prevent fractures and may be given as a pill or injection  Ask your healthcare provider for more information on these medicines  Prevent bone loss:  · Eat healthy foods that are high in calcium  This helps keep your bones strong  Good sources of calcium are milk, cheese, broccoli, tofu, almonds, and canned salmon and sardines  Recommended to get at least 1200mg daily of calcium  · Increase your vitamin D intake  Vitamin D is in fish oils, some vegetables, and fortified milk, cereal, and bread  Vitamin D is also formed in the skin when it is exposed to the sun  Ask your healthcare provider how much sunlight is safe for you  You will require at least 800 units of vitamin D daily taken as a supplement  · Drink liquids as directed  Ask your healthcare provider how much liquid to drink each day and which liquids are best for you  Do not have alcohol or caffeine  They decrease bone mineral density, which can weaken your bones  · Exercise regularly  Ask your healthcare provider about the best exercise plan for you  Weight bearing exercise for 30 minutes, 3 times a week can help build and strengthen bone  · Do not smoke  Nicotine and other chemicals in cigarettes and cigars can cause lung damage  Ask your healthcare provider for information if you currently smoke and need help to quit  E-cigarettes or smokeless tobacco still contain nicotine  Talk to your healthcare provider before you use these products  · Go to physical therapy as directed  A physical therapist teaches you exercises to help improve movement and muscle strength  · Alcohol   It is recommended to avoid heavy alcohol use as increased consumption of alcohol is known to cause bone loss  © Copyright [employer] 2021 Information is for End User's use only and may not be sold, redistributed or otherwise used for commercial purposes   All illustrations and images included in CareNotes® are the copyrighted property of A D A M , Inc  or Formerly named [facility] Eugenio Javier

## 2022-01-28 NOTE — ASSESSMENT & PLAN NOTE
Patient due for fasting blood work will order A1c and CMP in July of 2021 her A1c was 6 9 with a fasting sugar of 96 which technically needed she should be diabetic  Will await blood work results to determine

## 2022-01-28 NOTE — ASSESSMENT & PLAN NOTE
DEXA done 7/30/2021 with other PCP and showed new onset osteoporosis  Will refer to endo  1,200 mg calcium daily (600 twice aday) Check D  And phos levels

## 2022-01-28 NOTE — PROGRESS NOTES
Assessment and Plan:  Pt will RTO after her fasting labs to review in detail and determine what meds are appropriate and to review Dx again in more detail  Problem List Items Addressed This Visit        Digestive    Gastroesophageal reflux disease - Primary     Sees gastro on PPI daily  Radha Enriquez wants pt to talk to PCP to loose weight  IF she is DM than she will be referred to [facility]  Banks's esophagus with esophagitis     Stable with PPI daily  Has gastro  S/p EGD  Relevant Orders    CBC and differential       Respiratory    Asthma     Sees asthma and allergist and is now on Dupixant every two weeks, allegra, advair, Jeffery Solan  Pt  is still on methylprednisone every other day  Relevant Medications    methylprednisolone (MEDROL) 4 mg tablet    Atopic rhinitis     Sees asthma and allergist and is now on Dupixant every two weeks, allegra, advair, Jeffery Solan  Relevant Medications    fexofenadine (ALLEGRA) 180 MG tablet    fluticasone (FLONASE) 50 mcg/act nasal spray    methylprednisolone (MEDROL) 4 mg tablet       Musculoskeletal and Integument    Age-related osteoporosis without current pathological fracture     DEXA done 7/30/2021 with other PCP and showed new onset osteoporosis  Will refer to endo  1,200 mg calcium daily (600 twice aday) Check D  And phos levels  Relevant Orders    Phosphorus    PTH, intact    Ambulatory referral to Endocrinology       Other    Multiple-type hyperlipidemia    Relevant Orders    Lipid Panel with Direct LDL reflex    Vitamin D deficiency     Check vitamin-D  Relevant Orders    Vitamin D 25 hydroxy    Hyperglycemia     Patient due for fasting blood work will order A1c and CMP in July of 2021 her A1c was 6 9 with a fasting sugar of 96 which technically needed she should be diabetic  Will await blood work results to determine           Relevant Orders    Comprehensive metabolic panel    Hemoglobin A1C    Allergy to pineapple     Refill of epipen  Relevant Medications    EPINEPHrine (EPIPEN) 0 3 mg/0 3 mL SOAJ    Heat intolerance     Check TSH  Consider black cohosh if normal  Also could be SE from prednisone  Relevant Orders    TSH, 3rd generation      Other Visit Diagnoses     Healthcare maintenance        Relevant Orders    Ambulatory referral to Obstetrics / Gynecology    Encounter for screening mammogram for malignant neoplasm of breast        Relevant Orders    Mammo screening bilateral w 3d & cad    Frontal sinusitis, unspecified chronicity        Relevant Medications    fluticasone (FLONASE) 50 mcg/act nasal spray    Need for hepatitis C screening test        Relevant Orders    Hepatitis C Antibody ([facility])    Screening for HIV (human immunodeficiency virus)        Relevant Orders    HIV 1/2 Antigen/Antibody (4th Generation) w Reflex [facility]                 Diagnoses and all orders for this visit:    Gastroesophageal reflux disease, unspecified whether esophagitis present    Banks's esophagus with esophagitis  -     CBC and differential    Severe asthma, unspecified whether complicated, unspecified whether persistent  -     methylprednisolone (MEDROL) 4 mg tablet; Take 1 tablet (4 mg total) by mouth daily    Hyperglycemia  -     Comprehensive metabolic panel  -     Hemoglobin A1C    Multiple-type hyperlipidemia  -     Lipid Panel with Direct LDL reflex    Vitamin D deficiency  -     Vitamin D 25 hydroxy    Healthcare maintenance  -     Ambulatory referral to Obstetrics / Gynecology; Future    Encounter for screening mammogram for malignant neoplasm of breast  -     Mammo screening bilateral w 3d & cad; Future    Frontal sinusitis, unspecified chronicity  -     fluticasone (FLONASE) 50 mcg/act nasal spray; 1 spray into each nostril daily    Allergic rhinitis, unspecified seasonality, unspecified trigger  -     fexofenadine (ALLEGRA) 180 MG tablet;  Take 1 tablet (180 mg total) by mouth daily  -     fluticasone (FLONASE) 50 mcg/act nasal spray; 1 spray into each nostril daily    Need for hepatitis C screening test  -     Hepatitis C Antibody ([facility]); Future    Screening for HIV (human immunodeficiency virus)  -     HIV 1/2 Antigen/Antibody (4th Generation) w Reflex [facility]; Future    Age-related osteoporosis without current pathological fracture  -     Phosphorus; Future  -     PTH, intact; Future  -     Ambulatory referral to Endocrinology; Future    Allergy to pineapple  -     EPINEPHrine (EPIPEN) 0 3 mg/0 3 mL SOAJ; Inject 0 3 mL (0 3 mg total) into a muscle once for 1 dose    Heat intolerance  -     TSH, 3rd generation; Future    Other orders  -     Discontinue: alendronate (FOSAMAX) 70 mg tablet; Take 70 mg by mouth Once a week              Subjective:      Patient ID: Jerry Harvey is a 61 y o  female  CC:    Chief Complaint   Patient presents with    [facility]       HPI:    PT re-establishing as a pt  Had to change PCP due to insurance and therefore has not been seen in this office in about one year  The following portions of the patient's history were reviewed and updated as appropriate: allergies, current medications, past family history, past medical history, past social history, past surgical history and problem list       Review of Systems   Constitutional: Negative  HENT: Negative  Eyes: Negative  Respiratory: Negative  Cardiovascular: Negative  Gastrointestinal: Negative  Endocrine: Negative  Genitourinary: Negative  Musculoskeletal: Negative  Skin: Negative  Allergic/Immunologic: Negative  Neurological: Negative  Hematological: Negative  Psychiatric/Behavioral: Negative            Data to review:       Objective:    Vitals:    01/28/22 1226   BP: 120/82   BP Location: Left arm   Patient Position: Sitting   Cuff Size: Adult   Pulse: 96   Temp: 97 6 °F (36 4 °C)   TempSrc: Probe   Weight: 94 3 kg (208 lb)   Height: 5' 3" (1 6 m)        Physical Exam  Vitals and nursing note reviewed  Constitutional:       Appearance: Normal appearance  She is well-developed  HENT:      Head: Normocephalic and atraumatic  Eyes:      General: Lids are normal       Conjunctiva/sclera: Conjunctivae normal       Pupils: Pupils are equal, round, and reactive to light  Cardiovascular:      Rate and Rhythm: Normal rate and regular rhythm  Heart sounds: No murmur heard  Pulmonary:      Effort: Pulmonary effort is normal       Breath sounds: Normal breath sounds  Skin:     General: Skin is warm and dry  Neurological:      General: No focal deficit present  Mental Status: She is alert  Coordination: Coordination is intact  Psychiatric:         Mood and Affect: Mood normal          Behavior: Behavior normal  Behavior is cooperative  Thought Content: Thought content normal          Judgment: Judgment normal          BMI Counseling: Body mass index is 36 85 kg/m²  The BMI is above normal  Nutrition recommendations include reducing portion sizes, decreasing overall calorie intake, 3-5 servings of fruits/vegetables daily, reducing fast food intake, consuming healthier snacks, decreasing soda and/or juice intake, moderation in carbohydrate intake, increasing intake of lean protein, reducing intake of saturated fat and trans fat and reducing intake of cholesterol  BMI Counseling: Body mass index is 36 85 kg/m²  The BMI is above normal  Nutrition recommendations include decreasing portion sizes, encouraging healthy choices of fruits and vegetables, decreasing fast food intake, consuming healthier snacks, limiting drinks that contain sugar, moderation in carbohydrate intake, increasing intake of lean protein, reducing intake of saturated and trans fat and reducing intake of cholesterol  Exercise recommendations include exercising 3-5 times per week  No pharmacotherapy was ordered   Rationale for BMI follow-up plan is due to patient being overweight or obese  Depression Screening and Follow-up Plan: Patient was screened for depression during today's encounter  They screened negative with a PHQ-2 score of 0

## 2022-01-28 NOTE — ASSESSMENT & PLAN NOTE
Sees asthma and allergist and is now on Dupixant every two weeks, allegra, advair, Bobbetta Fickle  Pt  is still on methylprednisone every other day

## 2022-01-31 ENCOUNTER — APPOINTMENT (OUTPATIENT)
Dept: LAB | Facility: CLINIC | Age: 57
End: 2022-01-31
Payer: COMMERCIAL

## 2022-01-31 DIAGNOSIS — Z11.4 SCREENING FOR HIV (HUMAN IMMUNODEFICIENCY VIRUS): ICD-10-CM

## 2022-01-31 DIAGNOSIS — Z11.59 NEED FOR HEPATITIS C SCREENING TEST: ICD-10-CM

## 2022-01-31 DIAGNOSIS — R68.89 HEAT INTOLERANCE: ICD-10-CM

## 2022-01-31 DIAGNOSIS — M81.0 AGE-RELATED OSTEOPOROSIS WITHOUT CURRENT PATHOLOGICAL FRACTURE: ICD-10-CM

## 2022-01-31 LAB
25(OH)D3 SERPL-MCNC: 8.6 NG/ML (ref 30–100)
ALBUMIN SERPL BCP-MCNC: 3.6 G/DL (ref 3.5–5)
ALP SERPL-CCNC: 111 U/L (ref 46–116)
ALT SERPL W P-5'-P-CCNC: 72 U/L (ref 12–78)
ANION GAP SERPL CALCULATED.3IONS-SCNC: 4 MMOL/L (ref 4–13)
AST SERPL W P-5'-P-CCNC: 45 U/L (ref 5–45)
BASOPHILS # BLD AUTO: 0.1 THOUSANDS/ΜL (ref 0–0.1)
BASOPHILS NFR BLD AUTO: 1 % (ref 0–1)
BILIRUB SERPL-MCNC: 0.61 MG/DL (ref 0.2–1)
BUN SERPL-MCNC: 12 MG/DL (ref 5–25)
CALCIUM SERPL-MCNC: 9.8 MG/DL (ref 8.3–10.1)
CHLORIDE SERPL-SCNC: 101 MMOL/L (ref 100–108)
CHOLEST SERPL-MCNC: 227 MG/DL
CO2 SERPL-SCNC: 29 MMOL/L (ref 21–32)
CREAT SERPL-MCNC: 0.56 MG/DL (ref 0.6–1.3)
EOSINOPHIL # BLD AUTO: 0.34 THOUSAND/ΜL (ref 0–0.61)
EOSINOPHIL NFR BLD AUTO: 5 % (ref 0–6)
ERYTHROCYTE [DISTWIDTH] IN BLOOD BY AUTOMATED COUNT: 13 % (ref 11.6–15.1)
GFR SERPL CREATININE-BSD FRML MDRD: 103 ML/MIN/1.73SQ M
GLUCOSE P FAST SERPL-MCNC: 105 MG/DL (ref 65–99)
HCT VFR BLD AUTO: 44.8 % (ref 34.8–46.1)
HCV AB SER QL: NORMAL
HDLC SERPL-MCNC: 36 MG/DL
HGB BLD-MCNC: 14.8 G/DL (ref 11.5–15.4)
IMM GRANULOCYTES # BLD AUTO: 0.04 THOUSAND/UL (ref 0–0.2)
IMM GRANULOCYTES NFR BLD AUTO: 1 % (ref 0–2)
LDLC SERPL DIRECT ASSAY-MCNC: 121 MG/DL (ref 0–100)
LYMPHOCYTES # BLD AUTO: 3.02 THOUSANDS/ΜL (ref 0.6–4.47)
LYMPHOCYTES NFR BLD AUTO: 41 % (ref 14–44)
MCH RBC QN AUTO: 30.1 PG (ref 26.8–34.3)
MCHC RBC AUTO-ENTMCNC: 33 G/DL (ref 31.4–37.4)
MCV RBC AUTO: 91 FL (ref 82–98)
MONOCYTES # BLD AUTO: 0.61 THOUSAND/ΜL (ref 0.17–1.22)
MONOCYTES NFR BLD AUTO: 8 % (ref 4–12)
NEUTROPHILS # BLD AUTO: 3.25 THOUSANDS/ΜL (ref 1.85–7.62)
NEUTS SEG NFR BLD AUTO: 44 % (ref 43–75)
NRBC BLD AUTO-RTO: 0 /100 WBCS
PHOSPHATE SERPL-MCNC: 4.1 MG/DL (ref 2.7–4.5)
PLATELET # BLD AUTO: 220 THOUSANDS/UL (ref 149–390)
PMV BLD AUTO: 11.8 FL (ref 8.9–12.7)
POTASSIUM SERPL-SCNC: 4.7 MMOL/L (ref 3.5–5.3)
PROT SERPL-MCNC: 9.8 G/DL (ref 6.4–8.2)
PTH-INTACT SERPL-MCNC: 71.1 PG/ML (ref 18.4–80.1)
RBC # BLD AUTO: 4.91 MILLION/UL (ref 3.81–5.12)
SODIUM SERPL-SCNC: 134 MMOL/L (ref 136–145)
TRIGL SERPL-MCNC: 445 MG/DL
TSH SERPL DL<=0.05 MIU/L-ACNC: 2.11 UIU/ML (ref 0.36–3.74)
WBC # BLD AUTO: 7.36 THOUSAND/UL (ref 4.31–10.16)

## 2022-01-31 PROCEDURE — 84100 ASSAY OF PHOSPHORUS: CPT

## 2022-01-31 PROCEDURE — 83721 ASSAY OF BLOOD LIPOPROTEIN: CPT | Performed by: PHYSICIAN ASSISTANT

## 2022-01-31 PROCEDURE — 84443 ASSAY THYROID STIM HORMONE: CPT

## 2022-01-31 PROCEDURE — 83970 ASSAY OF PARATHORMONE: CPT

## 2022-01-31 PROCEDURE — 86803 HEPATITIS C AB TEST: CPT

## 2022-01-31 PROCEDURE — 80061 LIPID PANEL: CPT | Performed by: PHYSICIAN ASSISTANT

## 2022-01-31 PROCEDURE — 80053 COMPREHEN METABOLIC PANEL: CPT | Performed by: PHYSICIAN ASSISTANT

## 2022-01-31 PROCEDURE — 83036 HEMOGLOBIN GLYCOSYLATED A1C: CPT | Performed by: PHYSICIAN ASSISTANT

## 2022-01-31 PROCEDURE — 87389 HIV-1 AG W/HIV-1&-2 AB AG IA: CPT

## 2022-01-31 PROCEDURE — 82306 VITAMIN D 25 HYDROXY: CPT | Performed by: PHYSICIAN ASSISTANT

## 2022-01-31 PROCEDURE — 36415 COLL VENOUS BLD VENIPUNCTURE: CPT | Performed by: PHYSICIAN ASSISTANT

## 2022-01-31 PROCEDURE — 85025 COMPLETE CBC W/AUTO DIFF WBC: CPT | Performed by: PHYSICIAN ASSISTANT

## 2022-02-01 LAB
EST. AVERAGE GLUCOSE BLD GHB EST-MCNC: 148 MG/DL
HBA1C MFR BLD: 6.8 %
HIV 1+2 AB+HIV1 P24 AG SERPL QL IA: NORMAL

## 2022-02-01 PROCEDURE — 3044F HG A1C LEVEL LT 7.0%: CPT | Performed by: PHYSICIAN ASSISTANT

## 2022-02-02 ENCOUNTER — HOSPITAL ENCOUNTER (OUTPATIENT)
Dept: MAMMOGRAPHY | Facility: MEDICAL CENTER | Age: 57
Discharge: HOME | End: 2022-02-02
Payer: COMMERCIAL

## 2022-02-02 VITALS — WEIGHT: 208 LBS | BODY MASS INDEX: 36.86 KG/M2 | HEIGHT: 63 IN

## 2022-02-02 DIAGNOSIS — Z12.31 ENCOUNTER FOR SCREENING MAMMOGRAM FOR MALIGNANT NEOPLASM OF BREAST: ICD-10-CM

## 2022-02-02 PROCEDURE — 77067 SCR MAMMO BI INCL CAD: CPT

## 2022-02-02 PROCEDURE — 77063 BREAST TOMOSYNTHESIS BI: CPT

## 2022-02-04 ENCOUNTER — OFFICE VISIT (OUTPATIENT)
Dept: FAMILY MEDICINE CLINIC | Facility: CLINIC | Age: 57
End: 2022-02-04
Payer: COMMERCIAL

## 2022-02-04 VITALS
WEIGHT: 211 LBS | BODY MASS INDEX: 37.39 KG/M2 | TEMPERATURE: 97.4 F | HEIGHT: 63 IN | HEART RATE: 108 BPM | DIASTOLIC BLOOD PRESSURE: 74 MMHG | SYSTOLIC BLOOD PRESSURE: 124 MMHG

## 2022-02-04 DIAGNOSIS — E11.9 TYPE 2 DIABETES MELLITUS WITHOUT COMPLICATION, WITHOUT LONG-TERM CURRENT USE OF INSULIN (HCC): Primary | ICD-10-CM

## 2022-02-04 DIAGNOSIS — E78.2 MULTIPLE-TYPE HYPERLIPIDEMIA: ICD-10-CM

## 2022-02-04 DIAGNOSIS — E55.9 VITAMIN D DEFICIENCY: ICD-10-CM

## 2022-02-04 PROBLEM — R80.9 MICROALBUMINURIA: Status: ACTIVE | Noted: 2022-02-04

## 2022-02-04 LAB
CREAT UR-MCNC: 118 MG/DL
MICROALBUMIN UR-MCNC: 42.7 MG/L (ref 0–20)
MICROALBUMIN/CREAT 24H UR: 36 MG/G CREATININE (ref 0–30)

## 2022-02-04 PROCEDURE — 3060F POS MICROALBUMINURIA REV: CPT | Performed by: PHYSICIAN ASSISTANT

## 2022-02-04 PROCEDURE — 82043 UR ALBUMIN QUANTITATIVE: CPT | Performed by: PHYSICIAN ASSISTANT

## 2022-02-04 PROCEDURE — 1036F TOBACCO NON-USER: CPT | Performed by: PHYSICIAN ASSISTANT

## 2022-02-04 PROCEDURE — 92250 FUNDUS PHOTOGRAPHY W/I&R: CPT | Performed by: PHYSICIAN ASSISTANT

## 2022-02-04 PROCEDURE — 82570 ASSAY OF URINE CREATININE: CPT | Performed by: PHYSICIAN ASSISTANT

## 2022-02-04 PROCEDURE — 3008F BODY MASS INDEX DOCD: CPT | Performed by: PHYSICIAN ASSISTANT

## 2022-02-04 PROCEDURE — 3074F SYST BP LT 130 MM HG: CPT | Performed by: PHYSICIAN ASSISTANT

## 2022-02-04 PROCEDURE — 3066F NEPHROPATHY DOC TX: CPT | Performed by: PHYSICIAN ASSISTANT

## 2022-02-04 PROCEDURE — 99214 OFFICE O/P EST MOD 30 MIN: CPT | Performed by: PHYSICIAN ASSISTANT

## 2022-02-04 PROCEDURE — 4010F ACE/ARB THERAPY RXD/TAKEN: CPT | Performed by: PHYSICIAN ASSISTANT

## 2022-02-04 PROCEDURE — 3078F DIAST BP <80 MM HG: CPT | Performed by: PHYSICIAN ASSISTANT

## 2022-02-04 RX ORDER — LANCETS 33 GAUGE
EACH MISCELLANEOUS
Qty: 100 EACH | Refills: 3 | Status: SHIPPED | OUTPATIENT
Start: 2022-02-04

## 2022-02-04 RX ORDER — BLOOD-GLUCOSE METER
KIT MISCELLANEOUS
Qty: 1 KIT | Refills: 0 | Status: SHIPPED | OUTPATIENT
Start: 2022-02-04

## 2022-02-04 RX ORDER — ERGOCALCIFEROL 1.25 MG/1
50000 CAPSULE ORAL WEEKLY
Qty: 4 CAPSULE | Refills: 4 | Status: SHIPPED | OUTPATIENT
Start: 2022-02-04 | End: 2022-08-02

## 2022-02-04 RX ORDER — BLOOD SUGAR DIAGNOSTIC
STRIP MISCELLANEOUS
Qty: 100 EACH | Refills: 3 | Status: SHIPPED | OUTPATIENT
Start: 2022-02-04

## 2022-02-04 NOTE — ASSESSMENT & PLAN NOTE
Vitamin-D is extremely 8 6  She needs supplementation with 40798 units once weekly for at least 4 months and repeat goal is to transition her to over-the-counter supplements  Patient is having body and joint aches which is most likely contributed to this diagnosis

## 2022-02-04 NOTE — ASSESSMENT & PLAN NOTE
Fasting sugar was actually not bad 105 however A1c was 6 8 which does technically make her diabetic  I will make the diagnosis now send her to diabetic classes give her glucometer and hopefully through this she will be able to tweak her lifestyle make some changes and bring this in to control

## 2022-02-04 NOTE — RESULT ENCOUNTER NOTE
Please let pt know her kidneys are spilling a little more protein than they should and so treatment is a very small dose of lisinopril once daily at 2 5 mg  We will check urine again in 6 m-1 year

## 2022-02-04 NOTE — PROGRESS NOTES
Assessment and Plan:    Problem List Items Addressed This Visit        Endocrine    Type 2 diabetes mellitus without complication, without long-term current use of insulin ([facility]) - Primary     Fasting sugar was actually not bad 105 however A1c was 6 8 which does technically make her diabetic  I will make the diagnosis now send her to diabetic classes give her glucometer and hopefully through this she will be able to tweak her lifestyle make some changes and bring this in to control  Relevant Medications    Blood Glucose Monitoring Suppl (OneTouch Verio Reflect) w/Device KIT    glucose blood (OneTouch Verio) test strip    OneTouch Delica Lancets 68J MISC    Other Relevant Orders    Ambulatory referral to Diabetic Education    POCT urine microalbumin/creatinine    IRIS Diabetic eye exam    Hemoglobin A1C    Comprehensive metabolic panel    Hemoglobin A1c (w/out EAG) ([facility] ONLY)    Lipid Panel with Direct LDL reflex       Other    Multiple-type hyperlipidemia     Patient's cholesterol without any cholesterol medications is significant for a triglyceride 445 which is definitely in medication range also her hdl is too low at 36 and triglycerides are slightly elevated at 121  I will be sending patient to diabetic Education and hopefully with lifestyle changes she will bring these numbers down if not she will need treatment for hypertriglyceridemia with fenofibrate  Relevant Orders    Lipid Panel with Direct LDL reflex    Vitamin D deficiency     Vitamin-D is extremely 8 6  She needs supplementation with 01346 units once weekly for at least 4 months and repeat goal is to transition her to over-the-counter supplements  Relevant Medications    ergocalciferol (VITAMIN D2) 50,000 units            Patient's shoes and socks removed      Right Foot/Ankle   Right Foot Inspection  Skin Exam: skin normal  Skin not intact, no dry skin, no warmth, no callus, no erythema, no maceration, no abnormal color, no pre-ulcer, no ulcer and no callus  Toe Exam: ROM and strength within normal limits  No swelling, no tenderness, erythema and  no right toe deformity    Sensory   Proprioception: intact  Monofilament testing: intact    Vascular  Capillary refills: < 3 seconds  The right DP pulse is 2+  The right PT pulse is 2+  Right Toe  - Comprehensive Exam  Ecchymosis: none  Arch: normal  Hammertoes: absent  Claw Toes: absent  Swelling: none   Tenderness: none         Left Foot/Ankle  Left Foot Inspection  Skin Exam: skin normal  Skin not intact, no dry skin, no warmth, no erythema, no maceration, normal color, no pre-ulcer, no ulcer and no callus  Toe Exam: ROM and strength within normal limits  No swelling, no tenderness, no erythema and no left toe deformity  Sensory   Proprioception: intact  Monofilament testing: intact    Vascular  Capillary refills: < 3 seconds  The left DP pulse is 2+  The left PT pulse is 2+  Left Toe  - Comprehensive Exam  Ecchymosis: none  Arch: normal  Hammertoes: absent  Claw toes: absent  Swelling: none   Tenderness: none           Assign Risk Category  No deformity present  No loss of protective sensation  No weak pulses  Risk: 0         Diagnoses and all orders for this visit:    Type 2 diabetes mellitus without complication, without long-term current use of insulin ([facility] [address] )  -     Ambulatory referral to Diabetic Education; Future  -     POCT urine microalbumin/creatinine  -     IRIS Diabetic eye exam  -     Hemoglobin A1C; Future  -     Comprehensive metabolic panel; Future  -     Hemoglobin A1c (w/out EAG) ([facility] ONLY); Future  -     Blood Glucose Monitoring Suppl (OneTouch Verio Reflect) w/Device KIT; Check blood sugars once daily  Please substitute with appropriate alternative as covered by patient's insurance  Dx: E11 65  -     glucose blood (OneTouch Verio) test strip; Check blood sugars once daily   Please substitute with appropriate alternative as covered by patient's insurance  Dx: E11 65  -     OneTouch Delica Lancets 24Z MISC; Check blood sugars once daily  Please substitute with appropriate alternative as covered by patient's insurance  Dx: E11 65  -     Lipid Panel with Direct LDL reflex; Future    Vitamin D deficiency  -     ergocalciferol (VITAMIN D2) 50,000 units; Take 1 capsule (50,000 Units total) by mouth once a week    Multiple-type hyperlipidemia  -     Lipid Panel with Direct LDL reflex; Future              Subjective:      Patient ID: Dwain Lopez is a 61 y o  female  CC:    Chief Complaint   Patient presents with    Follow-up     review labs        HPI:    Problem List Items Addressed This Visit        Endocrine    Type 2 diabetes mellitus without complication, without long-term current   use of insulin ([facility] [address] ) - Primary     Fasting sugar was actually not bad 105 however A1c was 6 8 which does   technically make her diabetic  I will make the diagnosis now send her to   diabetic classes give her glucometer and hopefully through this she will   be able to tweak her lifestyle make some changes and bring this in to   control  Relevant Medications    Blood Glucose Monitoring Suppl (OneTouch Verio Reflect) w/Device KIT    glucose blood (OneTouch Verio) test strip    OneTouch Delica Lancets 98W MISC    Other Relevant Orders    Ambulatory referral to Diabetic Education    POCT urine microalbumin/creatinine    IRIS Diabetic eye exam    Hemoglobin A1C    Comprehensive metabolic panel    Hemoglobin A1c (w/out EAG) ([facility] ONLY)       Other    Multiple-type hyperlipidemia     Patient's cholesterol without any cholesterol medications is significant   for a triglyceride 445 which is definitely in medication range also her   hdl is too low at 36 and triglycerides are slightly elevated at 121   I   will be sending patient to diabetic Education and hopefully with lifestyle   changes she will bring these numbers down if not she will need treatment   for hypertriglyceridemia with fenofibrate  Vitamin D deficiency     Vitamin-D is extremely 8 6  She needs supplementation with 99168 units   once weekly for at least 4 months and repeat goal is to transition her to   over-the-counter supplements  The following portions of the patient's history were reviewed and updated as appropriate: allergies, current medications, past family history, past medical history, past social history, past surgical history and problem list       Review of Systems   Constitutional: Negative  HENT: Negative  Eyes: Negative  Respiratory: Negative  Cardiovascular: Negative  Gastrointestinal: Negative  Endocrine: Negative  Genitourinary: Negative  Musculoskeletal: Negative  Skin: Negative  Allergic/Immunologic: Negative  Neurological: Negative  Hematological: Negative  Psychiatric/Behavioral: Negative  Data to review:       Objective:    Vitals:    02/04/22 0857   BP: 124/74   BP Location: Left arm   Patient Position: Sitting   Cuff Size: Adult   Pulse: (!) 108   Temp: (!) 97 4 °F (36 3 °C)   TempSrc: Temporal   Weight: 95 7 kg (211 lb)   Height: 5' 3" (1 6 m)        Physical Exam  Vitals and nursing note reviewed  Constitutional:       Appearance: Normal appearance  She is well-developed  HENT:      Head: Normocephalic and atraumatic  Eyes:      General: Lids are normal       Conjunctiva/sclera: Conjunctivae normal       Pupils: Pupils are equal, round, and reactive to light  Cardiovascular:      Rate and Rhythm: Normal rate and regular rhythm  Pulses: no weak pulses          Dorsalis pedis pulses are 2+ on the right side and 2+ on the left side  Posterior tibial pulses are 2+ on the right side and 2+ on the left side  Heart sounds: No murmur heard  Pulmonary:      Effort: Pulmonary effort is normal       Breath sounds: Normal breath sounds     Feet:      Right foot:      Skin integrity: No ulcer, skin breakdown, erythema, warmth, callus or dry skin  Left foot:      Skin integrity: No ulcer, skin breakdown, erythema, warmth, callus or dry skin  Skin:     General: Skin is warm and dry  Neurological:      General: No focal deficit present  Mental Status: She is alert  Coordination: Coordination is intact  Psychiatric:         Mood and Affect: Mood normal          Behavior: Behavior normal  Behavior is cooperative  Thought Content:  Thought content normal          Judgment: Judgment normal

## 2022-02-04 NOTE — PATIENT INSTRUCTIONS
10% - bad control"> 10% - bad control,Hemoglobin A1c (HbA1c) greater than 10% indicating poor diabetic control,Haemoglobin A1c greater than 10% indicating poor diabetic control">   Diabetes mellitus tipo 2 en adultos, cuidados ambulatorios   INFORMACIÓN GENERAL:   La diabetes mellitus tipo 2 en adultos  es linda enfermedad que afecta la forma en que el cuerpo utiliza la glucosa (azúcar)  La insulina ayuda a extraer el azúcar de la elliot para que pueda usarse en la producción de energía  Generalmente, cuando el nivel de azúcar [place], el páncreas produce más insulina  La diabetes tipo 2 se desarrolla ya sea porque el cuerpo no puede producir suficiente insulina, o no la puede usar correctamente  Después de [employer], carrizales páncreas podría dejar de producir insulina  Síntomas comunes incluyen los siguientes:   · Más hambre o sed de la usual    · Necesidad frecuente de orinar     · Pérdida de peso sin tratar     · Visión borrosa  Busque cuidados inmediatos para los siguientes síntomas:   · Dolor abdominal severo o dolor que se propaga hacia carrizales espalda  Es probable que usted también vomite  · Dificultad para permanecer despierto o concentrado    · Temblores o sudoración    · Visión borrosa o doble    · Aliento con olor a frutas o sherry    · Respiración profunda y dificultosa o rápida y superficial    · Ritmo cardíaco rápido y débil  El tratamiento para la diabetes mellitus tipo 2  incluye mantener carrizales nivel de azúcar en el elliot a un rango normal  Usted debe comer los alimentos correctos y ejercitarse con regularidad  Además es probable que necesite medicamentos si no puede controlar carrizales nivel de azúcar en la elliot con nutrición y ejercicio  Maneje la diabetes mellitus tipo 2:   · Revise carrizales nivel de azúcar en la elliot  A usted le enseñarán cómo revisar linda pequeña gota de Keaton devlin en un medidor de glucosa  Pregúntele a carrizales proveedor de shonna cuándo y con cuánta frecuencia es necesario revisar tigre el día  También pregúntele a kowalski proveedor de shonna cuáles deberían ser shon niveles de azúcar en la elliot cuando usted se los [place]  · Mantenga un registro de los carbohidratos (azúcares y almidones)  Kowalski nivel de azúcar en la elliot puede elevarse demasiado si usted come demasiados carbohidratos  Kowalski dietista le ayudará a planear comidas y meriendas que tengan la cantidad correcta de carbohidratos  · Consuma alimentos bajos en grasas  [place] son el joy sin piel y la leche descremada  · Consuma menos sodio (sal)  Algunos ejemplos de alimentos altos en sodio que hay que limitar son la salsa de soya, las maikel tostadas y la sopa  No le agregue sal a la comida que usted cocina  Limite kowalski uso de sal de byrd  · Coma alimentos altos en fibra  Alimentos que son buena loretta de fibra incluyen los vegetales, el pan integral y los frijoles  · Limite el alcohol  El alcohol afecta kowalski nivel de azúcar en la elliot y puede dificultar el [place] de kowalski diabetes  Las mujeres deben limitar el consumo de alcohol a 1 bebida al día  Los hombres deben limitarlo a 2 debidas al día  Linda bebida de alcohol equivale a 12 onzas de cerveza, 5 onzas de vino o 1½ onzas de licor  · Ejercítese regularmente  El ejercicio puede ayudar a mantener estable kowalski nivel de azúcar en la elliot, al mismo tiempo que disminuye kowalski riesgo de enfermedad cardíaca y le ayuda a perder peso  Ejercítese por al menos 30 minutos, 5 días a la semana  Brittany Kraft de fortalecimiento muscular 2 días a la semana  Colabore con kowalski proveedor de shonna para crear un plan de ejercicios  · Revise shon pies a diario  para al si tienen heridas o llagas abiertas  Pregúntele a kowalski proveedor de [employer] que usted puede hacer si tiene linda llaga abierta  · Deje de fumar  Si usted fuma, nunca es tarde para dejar de hacerlo  El fumar puede [employer] problemas que puedan ocurrir con la diabetes   Pregúntele a kowalski proveedor de [place] sobre información para aprender a dejar de fumar si usted tiene dificultad para hacerlo  · Pregunte sobre brunson peso:  Pregúntele a shon proveedores de shonna si usted necesita perder peso y cuánto debe perder  Pídales que le ayuden con un programa de perdida de [place]  Incluso perder unas 10 a 15 libras puede ayudarle a manejar brunson nivel de azúcar en la elliot  · Tenga a mano brunson identificación de [employer]  Use un brazalete de alerta médica o lleve consigo linda tarjeta que diga que usted tiene diabetes  Pregúntele a brunson proveedor de shonna dónde conseguir estos artículos  · Pregunte sobre vacunas  La diabetes lo pone a usted en riesgo de enfermedad seria si usted se resfría, tiene neumonía o hepatitis  Pregúntele a brunson proveedor de shonna si usted debe ponerse las vacunas contra la gripe, neumonía o hepatitis B, y cuándo ponérselas  Programe linda amna con brunson proveedor de [employer] se le haya indicado: Anote shon preguntas para que se acuerde de hacerlas tigre shon visitas  ACUERDOS SOBRE BRUNSON CUIDADO:   Usted tiene el derecho de participar en la planificación de brunson cuidado  Aprenda todo lo que pueda sobre brunson condición y nikunj darle tratamiento  Discuta con shon médicos shon opciones de tratamiento para juntos decidir el cuidado que usted quiere recibir  Usted siempre tiene el derecho a rechazar brunson tratamiento  Esta información es sólo para uso en educación  Brunson intención no es darle un consejo médico sobre enfermedades o tratamientos  Colsulte con brunson Charna Heckler farmacéutico antes de seguir cualquier régimen médico para saber si es seguro y efectivo para usted  © 2014 [address] is for End User's use only and may not be sold, redistributed or otherwise used for commercial purposes  All illustrations and images included in CareNotes® are the copyrighted property of A D A M , Inc  or Dieudonne Hernández      Basic Carbohydrate Counting   AMBULATORY CARE:   Carbohydrate counting  is a way to plan your meals by counting the amount of carbohydrate in foods  Carbohydrates are the sugars, starches, and fiber found in fruit, grains, vegetables, and milk products  Carbohydrates increase your blood sugar levels  Carbohydrate counting can help you eat the right amount of carbohydrate to keep your blood sugar levels under control  What you need to know about planning meals using carbohydrate counting:  · A dietitian or healthcare provider will help you develop a healthy meal plan that works best for you  You will be taught how much carbohydrate to eat or drink for each meal and snack  Your meal plan will be based on your age, weight, usual food intake, and physical activity level  If you have diabetes, it will also include your blood sugar levels and diabetes medicine  Once you know how much carbohydrate you should eat, you can decide what type of food you want to eat  · You will need to know what foods contain carbohydrate and how much they contain  Keep track of the amount of carbohydrate in meals and snacks in order to follow your meal plan  Do not avoid carbohydrates or skip meals  Your blood sugar may fall too low if you do not eat enough carbohydrate or you skip meals  Foods that contain carbohydrate:   · Breads:  Each serving of food listed below contains about 15 g of carbohydrate   ? 1 slice of bread (1 ounce) or 1 flour or corn tortilla (6 inch)    ? ½ of a hamburger bun or ¼ of a large bagel (about 1 ounce)    ? 1 pancake (about 4 inches across and ¼ inch thick)    · Cereals and grains:  Serving sizes of ready-to-eat cereals vary  Look at the serving size and the total carbohydrate amount listed on the food label  Each serving of food listed below contains about 15 g of carbohydrate   ? ¾ cup of dry, unsweetened, ready-to-eat cereal or ¼ cup of low-fat granola     ?  ½ cup of oatmeal or other cooked cereal     ? ? cup of cooked rice or pasta    · Starchy vegetables and beans:  Each serving of food listed below contains about 15 g of carbohydrate   ? ½ cup of corn, green peas, sweet potatoes, or mashed potatoes    ? ¼ of a large baked potato    ? ½ cup of beans, lentils, and peas (garbanzo, bae, kidney, white, split, black-eyed)    · Crackers and snacks:  Each serving of food listed below contains about 15 g of carbohydrate   ? 3 bruce cracker squares or 8 animal crackers     ? 6 saltine-type crackers    ? 3 cups of popcorn or ¾ ounce of pretzels, potato chips, or tortilla chips    · Fruit:  Each serving of food listed below contains about 15 g of carbohydrate   ? 1 small (4 ounce) piece of fresh fruit or ¾ to 1 cup of fresh fruit    ? ½ cup of canned or frozen fruit, packed in natural juice    ? ½ cup (4 ounces) of unsweetened fruit juice    ? 2 tablespoons of dried fruit    · Desserts or sugary foods:  Each serving of food listed below contains about 15 g of carbohydrate   ? 2-inch square unfrosted cake or brownie     ? 2 small cookies    ? ½ cup of ice cream, frozen yogurt, or nondairy frozen yogurt    ? ¼ cup of sherbet or sorbet    ? 1 tablespoon of regular syrup, jam, or jelly    ? 2 tablespoons of light syrup    · Milk and yogurt:  Foods from the milk group contain about 12 g of carbohydrate per serving  ? 1 cup of fat-free or low-fat milk    ? 1 cup of soy milk    ? ? cup of fat-free, yogurt sweetened with artificial sweetener    · Non-starchy vegetables:  Each serving contains about 5 g of carbohydrate   Three servings of non-starch vegetables count as 1 carbohydrate serving  ? ½ cup of cooked vegetables or 1 cup of raw vegetables  This includes beets, broccoli, cabbage, cauliflower, cucumber, mushrooms, tomatoes, and zucchini    ? ½ cup of vegetable juice    How to use carbohydrate counting to plan meals:   · Count carbohydrate amounts using serving sizes:      ? Pasta dinner example:   You plan to have pasta, tossed salad, and an 8-ounce glass of milk  Your healthcare provider tells you that you may have 4 carbohydrate servings for dinner  One carbohydrate serving of pasta is ? cup  One cup of pasta will equal 3 carbohydrate servings  An 8-ounce glass of milk will count as 1 carbohydrate serving  These amounts of food would equal 4 carbohydrate servings  One cup of tossed salad does not count toward your carbohydrate servings  · Count carbohydrate amounts using food labels:  Find the total amount of carbohydrate in a packaged food by reading the food label  Food labels tell you the serving size of the food and the total carbohydrate amount in each serving  Find the serving size on the food label and then decide how many servings you will eat  Multiply the number of servings you plan to eat by the carbohydrate amount per serving  ? Granola bar snack example: Your meal plan allows you to have 2 carbohydrate servings (30 grams) of carbohydrate for a snack  You plan to eat 1 package of granola bars, which contains 2 bars  According to the food label, the serving size of food in this package is 1 bar  Each serving (1 bar) contains 25 grams of carbohydrate  The total amount of carbohydrate in this package of granola bars would be 50 g  Based on your meal plan, you should eat only 1 bar  Follow up with your doctor as directed:  Write down your questions so you remember to ask them during your visits  © Copyright [employer] 2021 Information is for End User's use only and may not be sold, redistributed or otherwise used for commercial purposes  All illustrations and images included in CareNotes® are the copyrighted property of [employer]  or [address] [place], [place] [address]winifred   The above information is an  only  It is not intended as medical advice for individual conditions or treatments  Talk to your doctor, nurse or pharmacist before following any medical regimen to see if it is safe and effective for you      Foot Care for People with Diabetes   AMBULATORY CARE:   What you need to know about foot care:   · Foot care helps protect your feet and prevent foot ulcers or sores  Long-term high blood sugar levels can damage the blood vessels and nerves in your legs and feet  This damage makes it hard to feel pressure, pain, temperature, and touch  You may not be able to feel a cut or sore, or shoes that are too tight  Foot care is needed to prevent serious problems, such as an infection or amputation  · Diabetes may cause your toes to become crooked or curved under  These changes may affect the way you walk and can lead to increased pressure on your foot  The pressure can decrease blood flow to your feet  Lack of blood flow increases your risk for a foot ulcer  Do not ignore small problems, such as dry skin or small wounds  These can become life-threatening over time without proper care  Call your care team provider if:   · Your feet become numb, weak, or hard to move  · You have pus draining from a sore on your foot  · You have a wound on your foot that gets bigger, deeper, or does not heal      · You see blisters, cuts, scratches, calluses, or sores on your foot  · You have a fever, and your feet become red, warm, and swollen  · Your toenails become thick, curled, or yellow  · You find it hard to check your feet because your vision is poor  · You have questions or concerns about your condition or care  How to care for your feet:   · Check your feet each day  Look at your whole foot, including the bottom, and between and under your toes  Check for wounds, corns, and calluses  Use a mirror to see the bottom of your feet  The skin on your feet may be shiny, tight, or darker than normal  Your feet may also be cold and pale  Feel your feet by running your hands along the tops, bottoms, sides, and between your toes  Redness, swelling, and warmth are signs of blood flow problems that can lead to a foot ulcer   Do not try to remove corns or calluses yourself  · Wash your feet each day with soap and warm water  Do not use hot water, because this can injure your foot  Dry your feet gently with a towel after you wash them  Dry between and under your toes  · Apply lotion or a moisturizer on your dry feet  Ask your care team provider what lotions are best to use  Do not put lotion or moisturizer between your toes  Moisture between your toes could lead to skin breakdown  · Cut your toenails correctly  File or cut your toenails straight across  Use a soft brush to clean around your toenails  If your toenails are very thick, you may need to have a care team provider or specialist cut them  · Protect your feet  Do not walk barefoot or wear your shoes without socks  Check your shoes for rocks or other objects that can hurt your feet  Wear cotton socks to help keep your feet dry  Wear socks without toe seams, or wear them with the seams inside out  Change your socks each day  Do not wear socks that are dirty or damp  · Wear shoes that fit well  Wear shoes that do not rub against any area of your feet  Your shoes should be ½ to ¾ inch (1 to 2 centimeters) longer than your feet  Your shoes should also have extra space around the widest part of your feet  Walking or athletic shoes with laces or straps that adjust are best  Ask your care team provider for help to choose shoes that fit you best  Ask him or her if you need to wear an insert, orthotic, or bandage on your feet  · Go to your follow-up visits  Your care team provider will do a foot exam at least once a year  You may need a foot exam more often if you have nerve damage, foot deformities, or ulcers  He will check for nerve damage and how well you can feel your feet  He will check your shoes to see if they fit well  · Do not smoke  Smoking can damage your blood vessels and put you at increased risk for foot ulcers   Ask your care team provider for information if you currently smoke and need help to quit  E-cigarettes or smokeless tobacco still contain nicotine  Talk to your care team provider before you use these products  Follow up with your diabetes care team provider or foot specialist as directed: You will need to have your feet checked at least once a year  You may need a foot exam more often if you have nerve damage, foot deformities, or ulcers  Write down your questions so you remember to ask them during your visits  © Copyright [employer] 2021 Information is for End User's use only and may not be sold, redistributed or otherwise used for commercial purposes  All illustrations and images included in CareNotes® are the copyrighted property of A D A M , Inc  or [facility] [employer]   The above information is an  only  It is not intended as medical advice for individual conditions or treatments  Talk to your doctor, nurse or pharmacist before following any medical regimen to see if it is safe and effective for you  What to Do if Your Blood Sugar is Low   AMBULATORY CARE:   Low blood sugar levels  (hypoglycemia) can happen with Type 1 and Type 2 diabetes  Low levels are more likely to happen if you use insulin  Hypoglycemia can cause you to have falls, accidents, and injuries  A blood sugar level that gets too low can lead to seizures, coma, and death  Learn to recognize the symptoms early so you can get treatment quickly  When your blood sugar is low you may feel:  · Sweaty    · Nervous or shaky    · Anxious or irritable    · Confused    · A fast, pounding heartbeat    · Extremely hungry    Have someone call your local emergency number (911 in the [address] [facility] [facility]) if:   · You cannot be woken  · You have a seizure  Call your doctor if:   · You have symptoms of a low blood sugar level, such as trouble thinking, sweating, or a pounding heartbeat  · Your blood sugar level is lower than normal and it does not improve with treatment       · You often have lower blood sugar levels than your target goals  · You have trouble coping with your illness, or you feel anxious or depressed  · You have questions or concerns about your condition or care  What to do if you have symptoms of low blood sugar:   · Check your blood sugar level, if possible  Your blood sugar level is too low if it is at or below 70 mg/dL  · Eat or drink 15 grams of fast-acting carbohydrate  Fast-acting carbohydrates will raise your blood sugar level quickly  Examples of 15 grams of fast-acting carbohydrates:     ? 4 ounces (½ cup) of fruit juice     ? 4 ounces of regular soda    ? 2 tablespoons of raisins     ? 1 tube of glucose gel or 3 to 4 glucose tablets       · Check your blood sugar level 15 minutes later  If the level is still low (less than 100 mg/dL), eat another 15 grams of carbohydrate  When the level returns to 100 mg/dL, eat a snack or meal that contains carbohydrates  This will help prevent another drop in blood sugar  · Teach people close to you how to use your glucagon kit  Your blood sugar may be too low for you to be awake  People need to know when and how to use your kit  Prevent low blood sugar levels:  Prevent low blood sugar by knowing what increases your risk  Ask your healthcare provider for ways to prevent low blood sugar levels  Any of the following can increase your risk of low blood sugar:  · Fasting for tests or procedures    · During or after intense exercise    · Late or postponed meals    · Sleeping (you may need a bedtime snack)     · Drinking alcohol if you use insulin or insulin releasing pills    Follow up with your doctor as directed:  Write down your questions so you remember to ask them during your visits  © Copyright [employer] 2021 Information is for End User's use only and may not be sold, redistributed or otherwise used for commercial purposes   All illustrations and images included in CareNotes® are the copyrighted property of A  D A M , Inc  or [address]   The above information is an  only  It is not intended as medical advice for individual conditions or treatments  Talk to your doctor, nurse or pharmacist before following any medical regimen to see if it is safe and effective for you  Problem List Items Addressed This Visit        Endocrine    Type 2 diabetes mellitus without complication, without long-term current use of insulin ([facility]) - Primary     Fasting sugar was actually not bad 105 however A1c was 6 8 which does technically make her diabetic  I will make the diagnosis now send her to diabetic classes give her glucometer and hopefully through this she will be able to tweak her lifestyle make some changes and bring this in to control  Relevant Medications    Blood Glucose Monitoring Suppl (OneTouch Verio Reflect) w/Device KIT    glucose blood (OneTouch Verio) test strip    OneTouch Delica Lancets 52H MISC    Other Relevant Orders    Ambulatory referral to Diabetic Education    POCT urine microalbumin/creatinine    IRIS Diabetic eye exam    Hemoglobin A1C    Comprehensive metabolic panel    Hemoglobin A1c (w/out EAG) ([facility] ONLY)       Other    Multiple-type hyperlipidemia     Patient's cholesterol without any cholesterol medications is significant for a triglyceride 445 which is definitely in medication range also her hdl is too low at 36 and triglycerides are slightly elevated at 121  I will be sending patient to diabetic Education and hopefully with lifestyle changes she will bring these numbers down if not she will need treatment for hypertriglyceridemia with fenofibrate  Vitamin D deficiency     Vitamin-D is extremely 8 6  She needs supplementation with 19956 units once weekly for at least 4 months and repeat goal is to transition her to over-the-counter supplements

## 2022-02-04 NOTE — ASSESSMENT & PLAN NOTE
Patient's cholesterol without any cholesterol medications is significant for a triglyceride 445 which is definitely in medication range also her hdl is too low at 36 and triglycerides are slightly elevated at 121  I will be sending patient to diabetic Education and hopefully with lifestyle changes she will bring these numbers down if not she will need treatment for hypertriglyceridemia with fenofibrate

## 2022-02-23 LAB
LEFT EYE DIABETIC RETINOPATHY: NORMAL
LEFT EYE IMAGE QUALITY: NORMAL
LEFT EYE MACULAR EDEMA: NORMAL
LEFT EYE OTHER RETINOPATHY: NORMAL
RIGHT EYE DIABETIC RETINOPATHY: NORMAL
RIGHT EYE IMAGE QUALITY: NORMAL
RIGHT EYE MACULAR EDEMA: NORMAL
RIGHT EYE OTHER RETINOPATHY: NORMAL
SEVERITY (EYE EXAM): NORMAL

## 2022-02-23 PROCEDURE — 2025F 7 FLD RTA PHOTO W/O RTNOPTHY: CPT | Performed by: PHYSICIAN ASSISTANT

## 2022-03-01 ENCOUNTER — CONSULT (OUTPATIENT)
Dept: ENDOCRINOLOGY | Facility: CLINIC | Age: 57
End: 2022-03-01
Payer: COMMERCIAL

## 2022-03-01 VITALS
DIASTOLIC BLOOD PRESSURE: 88 MMHG | HEART RATE: 80 BPM | BODY MASS INDEX: 37.21 KG/M2 | WEIGHT: 210 LBS | SYSTOLIC BLOOD PRESSURE: 140 MMHG | HEIGHT: 63 IN

## 2022-03-01 DIAGNOSIS — E61.1 IRON DEFICIENCY: ICD-10-CM

## 2022-03-01 DIAGNOSIS — E11.9 TYPE 2 DIABETES MELLITUS WITHOUT COMPLICATION, WITHOUT LONG-TERM CURRENT USE OF INSULIN (HCC): Primary | ICD-10-CM

## 2022-03-01 DIAGNOSIS — M81.0 AGE-RELATED OSTEOPOROSIS WITHOUT CURRENT PATHOLOGICAL FRACTURE: ICD-10-CM

## 2022-03-01 PROCEDURE — 3077F SYST BP >= 140 MM HG: CPT | Performed by: INTERNAL MEDICINE

## 2022-03-01 PROCEDURE — 3008F BODY MASS INDEX DOCD: CPT | Performed by: INTERNAL MEDICINE

## 2022-03-01 PROCEDURE — 1036F TOBACCO NON-USER: CPT | Performed by: INTERNAL MEDICINE

## 2022-03-01 PROCEDURE — 3079F DIAST BP 80-89 MM HG: CPT | Performed by: INTERNAL MEDICINE

## 2022-03-01 PROCEDURE — 99204 OFFICE O/P NEW MOD 45 MIN: CPT | Performed by: INTERNAL MEDICINE

## 2022-03-01 RX ORDER — ABALOPARATIDE 2000 UG/ML
INJECTION, SOLUTION SUBCUTANEOUS
Qty: 4.68 ML | Refills: 0 | Status: SHIPPED | OUTPATIENT
Start: 2022-03-01 | End: 2022-03-02 | Stop reason: SDUPTHER

## 2022-03-01 NOTE — LETTER
Medical Fitness Referral    Patient: Briana Beasley  : 1965  MRN: [id number]  Address: [facility] [address]  Phone Number: [phone]  E-mail: [email or web address]    [] Medical Disorders (Ex  Diabetes)   [] Cardiovascular Disorders (Ex  Hypertension)   [] Pulmonary Disorders (Ex  Asthma)   [] Cancer Disorders (Ex  Breast, Prostate)   [] Immunological & Hematological Disorders (Rheumatoid Arthritis)   [] Neurological Disorders (Ex  Parkinson's Disease)   [] Cognitive Disorders (Ex  Dementia)   [] Children & Adolescents (Ex  BMI)   [] Older Adults (Ex  Balance & Ambulation)   [] Female Specific Disorders (Ex   Osteoporosis)       Diagnoses:   1  Age-related osteoporosis without current pathological fracture  Ambulatory referral to Endocrinology     Additional Comments: Na    Service Requested:  [] Medical Fitness Consult- Screening For Appropriateness of Medical Fitness Program   [] Medical Fitness Program- Assessment of Body Composition, Aerobic, Anaerobic, Muscle Strength & Endurance, Flexibility, Neuromuscular & Functional Fitness   [] Medical Fitness Assessment- Individualized Evidence-Based Exercise Program       Requesting Provider: Andrea Hartman MD  Date: 22

## 2022-03-01 NOTE — PROGRESS NOTES
Juve Lombardo 61 y o  female MRN: [id number]    Encounter: [id number]      Assessment/Plan     Osteoporosis, most likely steroid induced  Type 2 diabetes  Obesity  Chronic use of steroid  Vitamin-D deficiency:   Sleep apnea       We reviewed pathophysiology of osteoporosis, including risk for fracture and morbidty and mortality associated with fracture  We reviewed treatment options and side effects, oral bisphosphonates, Reclast, prolia, Forteo,  tymlos, evenity  Including atrial fibrillation with IV reclast, osteonecrosis of jaw with bisphosphonate(s) and frozen bone syndrome with prolonged bisphosphonate(s) use  - Emphasized importance of regular calcium and vitamin D intake; Goal vitamin D >30 and calcium intake ~1200mg /day  - weight bearing exercise as tolerated  - she is agreeable with Tymlos which was ordered for 80 mcg once daily, she does not contraindications for using Tymlos including paget's disease, hx of radiation,    Continue vitamin D 50,000 units weekly , check vit -D before next visit,   Was referred to fitness center  Fall precaution was given  - she was referred to diabetes educator for MNT,   - we counseled her regarding pros and cons adding GLP-1 agonist to her current regimen, she does not have any contraindication including history of ME and, family history of medullary thyroid cancer, however like to continue with diet and exercise and to start with Ozempic if above regimen did not work after 3 months  Check CMP, lipid profile and CMP before next visit  She was refer to Sleep Medicine for evaluation for sleep apnea due to daytime sleepiness, fatigue and feeling unrefreshed in the morning          CC:  Osteoporosis will be    History of Present Illness     HPI:  Eduardo Rodriguez who is a 62 yo female with PMH of asthma on chronic use of steroid, hyperlipidemia, type 2 diabetes who was Referred by primary physician for management of Osteoporosis,  She has DXA scan done in July 2021 which showed femoral neck demonstrates a total bone mineral density of 0 574 g/cm2 with a   T-score of -2 5  Patient reports being on chronic use steroid for plan 20 years, on prednisone 10-20 mg once daily which was changed to methylprednisolone 4 mg every other day one year ago  previous fractures: No  h/o loss of 2 inches of adult height - No  H/o Falls No  H/o malabsorption No  H/o nephrolithiasisNo  H/o Rheumatoid Arthritis No  H/o hyperthyroidism No  Family History of Osteoporosis No    Contributing Drugs:  glucocorticoids:  Ever:   PPI                               Yes  Immunnosuppressants No                                             Antiestrogens               No                                              Antiandrogens             No  Lithium                         No                                                       Anticonvulsant             No    Diet:     lactose intolerance No  calcium intake as an adult (diet and supplements) no                                               vitamin D intake : 67503 units once weekly    Tobacco use: No  ETOH use >2 units/day:     No     Menstrual history:   Age at menopause : 45  Estrogen therapy No      She reports unable to losing weight despite eating healthy, she gained 5-6 Ibs in last 1-2 years,  She has history of type 2 diabetes since 2020, not any medication,   She has history of hyperlipidemia, currently on fenofibrate   She denies purple colored stria, proximal weakness, easy bruising,   She does not snore, however she reports day time sleepiness, and episodes of pause in her breathing while sleeping, she reports feeling fatigued, lack of energy,              Review of Systems   Constitutional: Positive for fatigue and unexpected weight change  Negative for activity change, appetite change, chills, diaphoresis and fever  HENT: Negative for congestion, drooling, ear discharge, ear pain, trouble swallowing and voice change  Eyes: Negative for photophobia, pain, discharge, redness, itching and visual disturbance  Respiratory: Negative for cough, chest tightness, shortness of breath and wheezing  Cardiovascular: Negative for chest pain, palpitations and leg swelling  Gastrointestinal: Negative for abdominal distention, abdominal pain, blood in stool, diarrhea, nausea and vomiting  Endocrine: Negative for cold intolerance, heat intolerance, polydipsia, polyphagia and polyuria  Genitourinary: Negative for dysuria, flank pain, frequency and hematuria  Musculoskeletal: Negative for back pain, gait problem, joint swelling, myalgias, neck pain and neck stiffness  Skin: Negative for color change, pallor, rash and wound  Neurological: Negative for dizziness, tremors, seizures, syncope, speech difficulty, weakness, numbness and headaches  Psychiatric/Behavioral: Positive for sleep disturbance  Negative for agitation  All other systems reviewed and are negative        Historical Information   Past Medical History:   Diagnosis Date    Asthma     Banks's esophagus     GERD (gastroesophageal reflux disease)     Hyperlipidemia     Migraine     Nasal polyposis      Past Surgical History:   Procedure Laterality Date    FUNCTIONAL ENDOSCOPIC SINUS SURGERY  2006    [facility] - Dr Felisa Armendariz COMP ASSIST PROC,CRANIAL,EXTRADURAL Bilateral 9/3/2019    Procedure: IMAGE GUIDED FUNCTIONAL ENDOSCOPIC SINUS SURGERY; Bilateral frontal sinusotomies;  Surgeon: Orman Kussmaul, DO;  [facility] OR;  Service: ENT     Social History   Social History     Substance and Sexual Activity   Alcohol Use Yes    Alcohol/week: 1 0 standard drink    Types: 1 Glasses of wine per week    Comment: No Use per [employer]     Social History     Substance and Sexual Activity   Drug Use Never    Comment: No use     Social History     Tobacco Use   Smoking Status Never Smoker   Smokeless Tobacco Never Used     Family History:   Family History   Problem Relation Age of Onset    Hypothyroidism Mother     Diabetes Mother     No Known Problems Father     No Known Problems Sister     No Known Problems Sister     Crohn's disease Daughter     No Known Problems Maternal Grandmother     No Known Problems Maternal Grandfather     No Known Problems Paternal Grandmother     No Known Problems Paternal Grandfather     No Known Problems Maternal Aunt     No Known Problems Maternal Aunt     No Known Problems Maternal Aunt     No Known Problems Paternal Aunt     No Known Problems Paternal Aunt        Meds/Allergies   Current Outpatient Medications   Medication Sig Dispense Refill    ADVAIR DISKUS 500-50 MCG/DOSE inhaler Inhale 1 puff 2 (two) times a day   3    albuterol (2 5 mg/3 mL) 0 083 % nebulizer solution TK 3 ML BY NEBULIZATION BID PRN FOR WHEEZING  11    Blood Glucose Monitoring Suppl (OneTouch Verio Reflect) w/Device KIT Check blood sugars once daily  Please substitute with appropriate alternative as covered by patient's insurance  Dx: E11 65 1 kit 0    BREO ELLIPTA 200-25 MCG/INH inhaler Inhale 1 puff daily   6    DEXILANT 60 MG capsule Take 60 mg by mouth daily   5    dupilumab (DUPIXENT) subcutaneous injection Inject 300 mg under the skin once       EPINEPHrine (EPIPEN) 0 3 mg/0 3 mL SOAJ Inject 0 3 mL (0 3 mg total) into a muscle once for 1 dose 2 each 3    ergocalciferol (VITAMIN D2) 50,000 units Take 1 capsule (50,000 Units total) by mouth once a week 4 capsule 4    fexofenadine (ALLEGRA) 180 MG tablet Take 1 tablet (180 mg total) by mouth daily 90 tablet 3    glucose blood (OneTouch Verio) test strip Check blood sugars once daily  Please substitute with appropriate alternative as covered by patient's insurance   Dx: E11 65 100 each 3    lisinopril (ZESTRIL) 2 5 mg tablet Take 1 tablet (2 5 mg total) by mouth daily 30 tablet 5    methylprednisolone (MEDROL) 4 mg tablet Take 1 tablet (4 mg total) by mouth daily      montelukast (SINGULAIR) 10 mg tablet Take 10 mg by mouth daily at bedtime       OneTouch Delica Lancets 85S MISC Check blood sugars once daily  Please substitute with appropriate alternative as covered by patient's insurance  Dx: E11 65 100 each 3    SPIRIVA RESPIMAT 2 5 MCG/ACT AERS inhaler Inhale 2 puffs daily as needed   5    VENTOLIN  (90 Base) MCG/ACT inhaler INL 2 PFS PO Q 4 H PRF WHZ  5    albuterol (ACCUNEB) 1 25 MG/3ML nebulizer solution Inhale (Patient not taking: Reported on 1/28/2022 )      fluticasone (FLONASE) 50 mcg/act nasal spray 1 spray into each nostril daily (Patient not taking: Reported on 3/1/2022 ) 16 g 11     No current facility-administered medications for this visit  Allergies   Allergen Reactions    Levofloxacin Hives    Statins      Myalgias    Cephalexin GI Intolerance    Clindamycin      Unsure of reaction     Metronidazole      Unsure of reaction       Objective   Vitals: Blood pressure 140/88, pulse 80, height 5' 3" (1 6 m), weight 95 3 kg (210 lb)  Physical Exam  Constitutional:       Appearance: She is well-developed  Comments: Central obesity    HENT:      Head: Normocephalic and atraumatic  Nose: Nose normal    Eyes:      Pupils: Pupils are equal, round, and reactive to light  Neck:      Thyroid: No thyromegaly  Vascular: No JVD  Comments: dorsicervical fat pad  Cardiovascular:      Rate and Rhythm: Normal rate and regular rhythm  Heart sounds: Normal heart sounds  No murmur heard  No friction rub  No gallop  Pulmonary:      Effort: Pulmonary effort is normal  No respiratory distress  Breath sounds: No stridor  Wheezing present  No rales  Chest:      Chest wall: No tenderness  Abdominal:      General: Bowel sounds are normal  There is no distension  Palpations: Abdomen is soft  There is no mass  Tenderness: There is no abdominal tenderness  There is no guarding     Musculoskeletal: General: No deformity  Normal range of motion  Cervical back: Normal range of motion  Skin:     General: Skin is warm  Neurological:      Mental Status: She is alert and oriented to person, place, and time  The history was obtained from the review of the chart, patient  Lab Results:   Lab Results   Component Value Date/Time    Hemoglobin A1C 6 8 (H) 01/31/2022 11:10 AM    Hemoglobin A1C 6 9 (H) 07/23/2021 12:56 PM    WBC 7 36 01/31/2022 11:10 AM    Hemoglobin 14 8 01/31/2022 11:10 AM    Hematocrit 44 8 01/31/2022 11:10 AM    MCV 91 01/31/2022 11:10 AM    Platelets 316 39/91/9544 11:10 AM    BUN 12 01/31/2022 11:10 AM    Potassium 4 7 01/31/2022 11:10 AM    Chloride 101 01/31/2022 11:10 AM    CO2 29 01/31/2022 11:10 AM    Creatinine 0 56 (L) 01/31/2022 11:10 AM    AST 45 01/31/2022 11:10 AM    ALT 72 01/31/2022 11:10 AM    Albumin 3 6 01/31/2022 11:10 AM    HDL, Direct 36 (L) 01/31/2022 11:10 AM    Triglycerides 445 (H) 01/31/2022 11:10 AM         Component      Latest Ref Rng & Units 1/31/2022 2/4/2022   EXT Creatinine Urine      mg/dL  118 0   MICROALBUM ,U,RANDOM      0 0 - 20 0 mg/L  42 7 (H)   MICROALBUMIN/CREATININE RATIO      0 - 30 mg/g creatinine  36 (H)   Vit D, 25-Hydroxy      30 0 - 100 0 ng/mL 8 6 (L)    Phosphorus      2 7 - 4 5 mg/dL 4 1    PARATHYROID HORMONE      18 4 - 80 1 pg/mL 71 1    TSH 3RD GENERATON      0 358 - 3 740 uIU/mL 2 110    LDL CHOLESTEROL DIRECT      0 - 100 mg/dl 121 (H)      Imaging Studies: I have personally reviewed pertinent reports  Exam: DEXA bone mineral density scan  History: 53-year-old postmenopausal right-handed female with no provided history   of adult fracture  History of asthma and vitamin D deficiency  Comparison: 11/16/2015  Results:  Evaluation of the lumbar spine demonstrates a total bone mineral   density of 0 679 gm/cm2 with a T-score of -3 3  The [facility]  classification is   osteoporosis   No gross evidence of vertebral body collapse is seen on the   limited AP  topogram      Evaluation of the left hip demonstrates a total bone mineral density of 0 811   g/cm2 with a T-score of -1 1  The [facility]  classification is osteopenia  The left   femoral neck demonstrates a total bone mineral density of 0 574 g/cm2 with a   T-score of -2 5  The [facility]  classification is osteoporosis  Calculated bone mineral density of the lumbar spine has decreased 15 9% in   comparison to the previous study  Calculated bone mineral density of the left total hip has increased 0 2% in   comparison to the previous study  Calculated bone mineral density of the left femoral neck has decreased 11 8% in   comparison to the previous study  Procedure Note    Lacie Burks MD - 07/30/2021   Formatting of this note might be different from the original    Exam: DEXA bone mineral density scan  History: 50-year-old postmenopausal right-handed female with no provided history   of adult fracture  History of asthma and vitamin D deficiency  Comparison: 11/16/2015  Results:  Evaluation of the lumbar spine demonstrates a total bone mineral   density of 0 679 gm/cm2 with a T-score of -3 3  The [facility]  classification is   osteoporosis  No gross evidence of vertebral body collapse is seen on the   limited AP  topogram      Evaluation of the left hip demonstrates a total bone mineral density of 0 811   g/cm2 with a T-score of -1 1  The [facility]  classification is osteopenia  The left   femoral neck demonstrates a total bone mineral density of 0 574 g/cm2 with a   T-score of -2 5  The [facility]  classification is osteoporosis  Calculated bone mineral density of the lumbar spine has decreased 15 9% in   comparison to the previous study  Calculated bone mineral density of the left total hip has increased 0 2% in   comparison to the previous study       Calculated bone mineral density of the left femoral neck has decreased 11 8% in   comparison to the previous study  IMPRESSION:   Impression: Analysis of the left femoral neck and lumbar spine demonstrate   significantly diminished bone mineral density in the osteoporotic range with   evidence for increased fracture risk  Portions of the record may have been created with voice recognition software  Occasional wrong word or "sound a like" substitutions may have occurred due to the inherent limitations of voice recognition software  Read the chart carefully and recognize, using context, where substitutions have occurred

## 2022-03-02 ENCOUNTER — TELEPHONE (OUTPATIENT)
Dept: ENDOCRINOLOGY | Facility: CLINIC | Age: 57
End: 2022-03-02

## 2022-03-02 ENCOUNTER — APPOINTMENT (OUTPATIENT)
Dept: LAB | Facility: CLINIC | Age: 57
End: 2022-03-02
Payer: COMMERCIAL

## 2022-03-02 DIAGNOSIS — M81.0 AGE-RELATED OSTEOPOROSIS WITHOUT CURRENT PATHOLOGICAL FRACTURE: ICD-10-CM

## 2022-03-02 DIAGNOSIS — E11.9 TYPE 2 DIABETES MELLITUS WITHOUT COMPLICATION, WITHOUT LONG-TERM CURRENT USE OF INSULIN (HCC): ICD-10-CM

## 2022-03-02 DIAGNOSIS — E61.1 IRON DEFICIENCY: ICD-10-CM

## 2022-03-02 LAB
FERRITIN SERPL-MCNC: 113 NG/ML (ref 8–388)
IRON SATN MFR SERPL: 27 % (ref 15–50)
IRON SERPL-MCNC: 84 UG/DL (ref 50–170)
TIBC SERPL-MCNC: 315 UG/DL (ref 250–450)

## 2022-03-02 PROCEDURE — 82728 ASSAY OF FERRITIN: CPT

## 2022-03-02 PROCEDURE — 83550 IRON BINDING TEST: CPT

## 2022-03-02 PROCEDURE — 36415 COLL VENOUS BLD VENIPUNCTURE: CPT

## 2022-03-02 PROCEDURE — 83540 ASSAY OF IRON: CPT

## 2022-03-02 RX ORDER — ABALOPARATIDE 2000 UG/ML
INJECTION, SOLUTION SUBCUTANEOUS
Qty: 4.68 ML | Refills: 0 | Status: SHIPPED | OUTPATIENT
Start: 2022-03-02 | End: 2022-03-02 | Stop reason: SDUPTHER

## 2022-03-02 RX ORDER — ABALOPARATIDE 2000 UG/ML
INJECTION, SOLUTION SUBCUTANEOUS
Qty: 4.68 ML | Refills: 0 | Status: SHIPPED | OUTPATIENT
Start: 2022-03-02 | End: 2022-03-04 | Stop reason: SDUPTHER

## 2022-03-02 NOTE — TELEPHONE ENCOUNTER
She tried to schedule appt for education but not able till April    Said if note from you could do earlier please advise

## 2022-03-04 DIAGNOSIS — M81.0 AGE-RELATED OSTEOPOROSIS WITHOUT CURRENT PATHOLOGICAL FRACTURE: ICD-10-CM

## 2022-03-04 RX ORDER — ABALOPARATIDE 2000 UG/ML
INJECTION, SOLUTION SUBCUTANEOUS
Qty: 4.68 ML | Refills: 0 | Status: SHIPPED | OUTPATIENT
Start: 2022-03-04 | End: 2022-05-31

## 2022-03-08 ENCOUNTER — TELEPHONE (OUTPATIENT)
Dept: ENDOCRINOLOGY | Facility: CLINIC | Age: 57
End: 2022-03-08

## 2022-03-15 ENCOUNTER — TELEPHONE (OUTPATIENT)
Dept: ENDOCRINOLOGY | Facility: CLINIC | Age: 57
End: 2022-03-15

## 2022-03-15 NOTE — TELEPHONE ENCOUNTER
Rico Edwards: [id number]  Need help?   Call us at [phone]    Status   Sent to [employer]    Drug    Tymlos 3120MCG/1 56ML pen-injectors   Form    [facility] [employer] Drug Authorization Form

## 2022-03-18 ENCOUNTER — TELEMEDICINE (OUTPATIENT)
Dept: ENDOCRINOLOGY | Facility: OTHER | Age: 57
End: 2022-03-18
Payer: COMMERCIAL

## 2022-03-18 DIAGNOSIS — E61.1 IRON DEFICIENCY: ICD-10-CM

## 2022-03-18 DIAGNOSIS — M81.0 AGE-RELATED OSTEOPOROSIS WITHOUT CURRENT PATHOLOGICAL FRACTURE: ICD-10-CM

## 2022-03-18 DIAGNOSIS — E11.9 TYPE 2 DIABETES MELLITUS WITHOUT COMPLICATION, WITHOUT LONG-TERM CURRENT USE OF INSULIN (HCC): ICD-10-CM

## 2022-03-18 PROCEDURE — 97802 MEDICAL NUTRITION INDIV IN: CPT | Performed by: DIETITIAN, REGISTERED

## 2022-03-18 NOTE — PROGRESS NOTES
Virtual Regular Visit    Verification of patient location:    Patient is located in the following state in which I hold an active license [place]      Assessment/Plan:    Problem List Items Addressed This Visit        Endocrine    Type 2 diabetes mellitus without complication, without long-term current use of insulin ([address] )       Musculoskeletal and Integument    Age-related osteoporosis without current pathological fracture      Other Visit Diagnoses     Iron deficiency                   Reason for visit is   Chief Complaint   Patient presents with    Diabetes Type 2    Virtual Regular Visit        Encounter provider Luc Gabriel    Provider located at [address] [place] [id number] [phone]      Recent Visits  No visits were found meeting these conditions  Showing recent visits within past 7 days and meeting all other requirements  Today's Visits  Date Type Provider Dept   03/18/22 Telemedicine Cora Sandifer [facility]   Showing today's visits and meeting all other requirements  Future Appointments  No visits were found meeting these conditions  Showing future appointments within next 150 days and meeting all other requirements       The patient was identified by name and date of birth  Otoniel Collins was informed that this is a telemedicine visit and that the visit is being conducted through [address] and patient was informed this is a secure, HIPAA-complaint platform  She agrees to proceed     My office door was closed  No one else was in the room  She acknowledged consent and understanding of privacy and security of the video platform  The patient has agreed to participate and understands they can discontinue the visit at any time  Patient is aware this is a billable service  Subjective  Otoniel Collins is a 61 y o  female , please see note below         HPI     Past Medical History:   Diagnosis Date    Asthma     Banks's esophagus     GERD (gastroesophageal reflux disease)     Hyperlipidemia     Migraine     Nasal polyposis        Past Surgical History:   Procedure Laterality Date    FUNCTIONAL ENDOSCOPIC SINUS SURGERY  2006    [facility] - Dr Myrtle Colby COMP ASSIST PROC,CRANIAL,EXTRADURAL Bilateral 9/3/2019    Procedure: IMAGE GUIDED FUNCTIONAL ENDOSCOPIC SINUS SURGERY; Bilateral frontal sinusotomies;  Surgeon: Luciano Ricardo DO;  Location: [facility];  Service: ENT       Current Outpatient Medications   Medication Sig Dispense Refill    Abaloparatide (Tymlos) 3120 MCG/1 56ML SOPN 80 mcg once daily subcutaneously 4 68 mL 0    ADVAIR DISKUS 500-50 MCG/DOSE inhaler Inhale 1 puff 2 (two) times a day   3    albuterol (2 5 mg/3 mL) 0 083 % nebulizer solution TK 3 ML BY NEBULIZATION BID PRN FOR WHEEZING  11    albuterol (ACCUNEB) 1 25 MG/3ML nebulizer solution Inhale (Patient not taking: Reported on 1/28/2022 )      Blood Glucose Monitoring Suppl (OneTouch Verio Reflect) w/Device KIT Check blood sugars once daily  Please substitute with appropriate alternative as covered by patient's insurance  Dx: E11 65 1 kit 0    BREO ELLIPTA 200-25 MCG/INH inhaler Inhale 1 puff daily   6    DEXILANT 60 MG capsule Take 60 mg by mouth daily   5    dupilumab (DUPIXENT) subcutaneous injection Inject 300 mg under the skin once       EPINEPHrine (EPIPEN) 0 3 mg/0 3 mL SOAJ Inject 0 3 mL (0 3 mg total) into a muscle once for 1 dose 2 each 3    ergocalciferol (VITAMIN D2) 50,000 units Take 1 capsule (50,000 Units total) by mouth once a week 4 capsule 4    fexofenadine (ALLEGRA) 180 MG tablet Take 1 tablet (180 mg total) by mouth daily 90 tablet 3    fluticasone (FLONASE) 50 mcg/act nasal spray 1 spray into each nostril daily (Patient not taking: Reported on 3/1/2022 ) 16 g 11    glucose blood (OneTouch Verio) test strip Check blood sugars once daily  Please substitute with appropriate alternative as covered by patient's insurance  Dx: E11 65 100 each 3    lisinopril (ZESTRIL) 2 5 mg tablet Take 1 tablet (2 5 mg total) by mouth daily 30 tablet 5    methylprednisolone (MEDROL) 4 mg tablet Take 1 tablet (4 mg total) by mouth daily      montelukast (SINGULAIR) 10 mg tablet Take 10 mg by mouth daily at bedtime       OneTouch Delica Lancets 71H MISC Check blood sugars once daily  Please substitute with appropriate alternative as covered by patient's insurance  Dx: E11 65 100 each 3    SPIRIVA RESPIMAT 2 5 MCG/ACT AERS inhaler Inhale 2 puffs daily as needed   5    VENTOLIN  (90 Base) MCG/ACT inhaler INL 2 PFS PO Q 4 H PRF WHZ  5     No current facility-administered medications for this visit  Allergies   Allergen Reactions    Levofloxacin Hives    Statins      Myalgias    Cephalexin GI Intolerance    Clindamycin      Unsure of reaction     Metronidazole      Unsure of reaction       Review of Systems    Video Exam    There were no vitals filed for this visit  Physical Exam     I spent 45 minutes directly with the patient during this visit    VIRTUAL VISIT Amadoxuandago Arnold verbally agrees to participate in [employer]  Pt is aware that [employer] could be limited without vital signs or the ability to perform a full hands-on physical [address] understands she or the provider may request at any time to terminate the video visit and request the patient to seek care or treatment in person  Medical Nutrition Therapy        Assessment    Visit Type: Initial visit    Chief complaint Patient has a recent diagnosis of type 2 diabetes related to chronic use of steroids to manage asthma  She reports that she currently takes no medications for blood glucose control  HPI: Darius diet history reveals 1-3 meals daily with inconsistent snacking throughout the day   She consumes mostly homemade foods and includes vegetables, fruits, and some whole grains  Fat servings may be too generous as used with cooking and meal preparation, however she does drink her coffee black and eats little processed food  Currently meals range from 15 to 60 grams of carbohydrate  Explained basic pathophysiology of diabetes and impact of diet on blood glucose levels  Explained impact of excessive fat and saturated fat intake  Discussed importance of eating meals every 4-5 waking hours  Together we discussed what foods contain CHO and serving sizes  Used the portion booklet to teach Mkne [place] more about food groups and basic carbohydrate counting  Read through the portion book and patient took notes, she had not seen email sent prior to the appointment with the portion booklet attached  After concluding the call, created an individualized meal plan for Amada with 3 meals and 2 snacks providing 30-45 g carb per meal and 15 g carb per snack  This plan will help promote weight loss  Discussed sources of calcium in diet  Patient does not drink milk but does consume yogurt  She also eats plenty of vegetables, encouraged green leafy vegetables, nuts, and whole grains and legumes for some plant-based calcium  Estefani Coleman will call with questions or for more education      Ht Readings from Last 1 Encounters:   03/01/22 5' 3" (1 6 m)     Wt Readings from Last 2 Encounters:   03/01/22 95 3 kg (210 lb)   02/04/22 95 7 kg (211 lb)     Weight Change: Yes varies    Medical Diagnosis/ICD10: E11 9 (ICD-10-CM) - Type 2 diabetes mellitus without complication, without long-term current use of insulin (HCC)    Barriers to Learning: no barriers    Do you follow any special diet presently?: not much different  Who shops: patient  Who cooks: patient    Food Log: Completed via the method of food recall    Breakfast:up between 5-6, drinks black coffee until 11-12  Morning Snack:  Lunch: toast w/ hummus, olives OR tabouli or fattoush salad w/ peg bread  Afternoon Snack: may have fruit  Dinner:ground beef taco, 1 hard shell, one soft, with beef, tomato, lettuce, sour cream, cheese OR green beans with chicken and tomato sauce on 1 cup brown rice, roasted cauliflower  Evening Snack:nothing usually, may have a piece of fruit  Beverages: black coffee, water, diet ginger ale or 7-Up, one glass of wine 1-2 times per week  Eating out/Take out:chicken wrap with avocado dressing  Exercise will start going to medical fitness program tomorrow    Calorie needs 1310 kcals/day Carbs: 30-45g/meal, 15g/snack         Nutrition Diagnosis:  Food and nutrition related knowledge deficit  related to Lack of prior exposure to accurate nutrition related information as evidenced by New medical diagnosis or change in existing diagnosis or condition    Intervention: carbohydrate counting, increased plant based foods, meal planning, individualized meal plan, monitoring portion control and exercise guidelines     Treatment Goals: Patient understands education and recommendations, Patient will consume 3 meals a day and Patient will count carbohydrates    Monitoring and evaluation:    Term code indicator  FH 1 3 2 Food Intake Criteria: Eat 3 meals/day  Term code indicator  FH 1 6 3 Carbohydrate Intake Criteria: 30-45 g carb per meal    Patients Response to Instruction:  Comprehensiongood  Motivationvery good  Expected Compliancegood    Thank you for coming to the [facility] for education today  Please feel free to call with any questions or concerns      [facility]  [id number] [facility] [phone]

## 2022-03-18 NOTE — PATIENT INSTRUCTIONS
1  Follow meal plan  2  Take time to plan out a few days worth of meal ideas, plan and prep your lunches so you don't skip meals  3  Eat more non-starchy vegetables  4   Follow medical fitness guidelines for increasing physical activity

## 2022-04-04 ENCOUNTER — TELEPHONE (OUTPATIENT)
Dept: ENDOCRINOLOGY | Facility: CLINIC | Age: 57
End: 2022-04-04

## 2022-04-04 NOTE — TELEPHONE ENCOUNTER
We can change her to [facility] which will require a new prior authorization  Please let the patient know that this can take some time

## 2022-04-07 NOTE — TELEPHONE ENCOUNTER
Forteo has to be taken for 24 months instead of 18 months for Tymlos  I am not sure why she is having side effects from Tymlos  My thought would be to stop the Tymlos to see if her symptoms improve before starting Forteo

## 2022-04-07 NOTE — TELEPHONE ENCOUNTER
Message was review with Pt, is you think Forteo is better for her she will take it, She is taking Dupixent injections and not sure is this is why she is getting side effect from [employer]

## 2022-05-31 DIAGNOSIS — M81.0 AGE-RELATED OSTEOPOROSIS WITHOUT CURRENT PATHOLOGICAL FRACTURE: ICD-10-CM

## 2022-05-31 RX ORDER — ABALOPARATIDE 2000 UG/ML
INJECTION, SOLUTION SUBCUTANEOUS
Qty: 1.56 ML | Refills: 0 | Status: SHIPPED | OUTPATIENT
Start: 2022-05-31

## 2022-06-03 ENCOUNTER — RA CDI HCC (OUTPATIENT)
Dept: OTHER | Facility: HOSPITAL | Age: 57
End: 2022-06-03

## 2022-06-03 NOTE — PROGRESS NOTES
[facility]  coding opportunities          Chart Reviewed number of suggestions sent to Provider: 1     Patients Insurance        Commercial Insurance: [employer]       [id number]

## 2022-06-29 ENCOUNTER — VBI (OUTPATIENT)
Dept: ADMINISTRATIVE | Facility: OTHER | Age: 57
End: 2022-06-29

## 2022-07-08 ENCOUNTER — OFFICE VISIT (OUTPATIENT)
Dept: FAMILY MEDICINE CLINIC | Facility: CLINIC | Age: 57
End: 2022-07-08
Payer: COMMERCIAL

## 2022-07-08 VITALS
HEART RATE: 92 BPM | HEIGHT: 63 IN | TEMPERATURE: 97.4 F | BODY MASS INDEX: 35.08 KG/M2 | SYSTOLIC BLOOD PRESSURE: 118 MMHG | DIASTOLIC BLOOD PRESSURE: 78 MMHG | WEIGHT: 198 LBS

## 2022-07-08 DIAGNOSIS — R39.9 UTI SYMPTOMS: Primary | ICD-10-CM

## 2022-07-08 LAB
SL AMB  POCT GLUCOSE, UA: NORMAL
SL AMB LEUKOCYTE ESTERASE,UA: NORMAL
SL AMB POCT BILIRUBIN,UA: NORMAL
SL AMB POCT BLOOD,UA: NORMAL
SL AMB POCT CLARITY,UA: CLEAR
SL AMB POCT COLOR,UA: YELLOW
SL AMB POCT KETONES,UA: NORMAL
SL AMB POCT NITRITE,UA: NORMAL
SL AMB POCT PH,UA: 5
SL AMB POCT SPECIFIC GRAVITY,UA: 1.01
SL AMB POCT URINE PROTEIN: NORMAL
SL AMB POCT UROBILINOGEN: 0.2

## 2022-07-08 PROCEDURE — 99214 OFFICE O/P EST MOD 30 MIN: CPT | Performed by: NURSE PRACTITIONER

## 2022-07-08 PROCEDURE — 3061F NEG MICROALBUMINURIA REV: CPT | Performed by: NURSE PRACTITIONER

## 2022-07-08 PROCEDURE — 3078F DIAST BP <80 MM HG: CPT | Performed by: NURSE PRACTITIONER

## 2022-07-08 PROCEDURE — 87086 URINE CULTURE/COLONY COUNT: CPT | Performed by: NURSE PRACTITIONER

## 2022-07-08 PROCEDURE — 81002 URINALYSIS NONAUTO W/O SCOPE: CPT | Performed by: NURSE PRACTITIONER

## 2022-07-08 PROCEDURE — 3074F SYST BP LT 130 MM HG: CPT | Performed by: NURSE PRACTITIONER

## 2022-07-08 RX ORDER — PHENAZOPYRIDINE HYDROCHLORIDE 200 MG/1
200 TABLET, FILM COATED ORAL
Qty: 10 TABLET | Refills: 0 | Status: SHIPPED | OUTPATIENT
Start: 2022-07-08 | End: 2022-08-02

## 2022-07-08 RX ORDER — CEPHALEXIN 500 MG/1
500 CAPSULE ORAL EVERY 8 HOURS SCHEDULED
Qty: 30 CAPSULE | Refills: 0 | Status: SHIPPED | OUTPATIENT
Start: 2022-07-08 | End: 2022-07-18

## 2022-07-08 NOTE — PROGRESS NOTES
Assessment and Plan:    Problem List Items Addressed This Visit        Other    UTI symptoms - Primary     patient significantly with discomfort will start pyridium and keflex   Will send urine for culture            Relevant Medications    cephalexin (KEFLEX) 500 mg capsule    phenazopyridine (PYRIDIUM) 200 mg tablet    Other Relevant Orders    POCT urine dip (Completed)    Urine culture                 Diagnoses and all orders for this visit:    UTI symptoms  -     POCT urine dip  -     Urine culture; Future  -     Urine culture  -     cephalexin (KEFLEX) 500 mg capsule; Take 1 capsule (500 mg total) by mouth every 8 (eight) hours for 10 days  -     phenazopyridine (PYRIDIUM) 200 mg tablet; Take 1 tablet (200 mg total) by mouth 3 (three) times a day with meals            Subjective:      Patient ID: Holger Salcido is a 61 y o  female  CC:    Chief Complaint   Patient presents with    Possible UTI     Pt states she has burning when urinating admits it also hurts while sitting  Pt states this has been ongoing for three days  HPI:    HPI     Patient reports her symptoms started 3 days ago  She has intense pain with urination  She has seen some blood tinge  Has some nausea  She has never had uti before  She drinks lots of water      The following portions of the patient's history were reviewed and updated as appropriate: allergies, current medications, past family history, past medical history, past social history, past surgical history and problem list       Review of Systems   Constitutional: Negative for chills, diaphoresis, fatigue and fever  Gastrointestinal: Positive for nausea  Genitourinary: Positive for dysuria, flank pain, frequency, hematuria and urgency  Negative for difficulty urinating  Neurological: Negative            Data to review:       Objective:    Vitals:    07/08/22 0953   BP: 118/78   BP Location: Right arm   Patient Position: Sitting   Cuff Size: Adult   Pulse: 92 Temp: (!) 97 4 °F (36 3 °C)   TempSrc: Temporal   Weight: 89 8 kg (198 lb)   Height: 5' 3" (1 6 m)        Physical Exam  Vitals and nursing note reviewed  Constitutional:       General: She is not in acute distress  Appearance: Normal appearance  She is not ill-appearing or diaphoretic  HENT:      Head: Normocephalic and atraumatic  Right Ear: External ear normal       Left Ear: External ear normal    Eyes:      Extraocular Movements: Extraocular movements intact  Conjunctiva/sclera: Conjunctivae normal    Cardiovascular:      Rate and Rhythm: Normal rate and regular rhythm  Heart sounds: Normal heart sounds, S1 normal and S2 normal  No murmur heard  Pulmonary:      Effort: Pulmonary effort is normal       Breath sounds: Normal breath sounds  Abdominal:      General: Abdomen is flat  Palpations: Abdomen is soft  Tenderness: There is abdominal tenderness in the suprapubic area  There is no right CVA tenderness or left CVA tenderness  Skin:     Coloration: Skin is not pale  Neurological:      Mental Status: She is alert and oriented to person, place, and time  Psychiatric:         Mood and Affect: Mood normal          Behavior: Behavior normal          Thought Content:  Thought content normal          Judgment: Judgment normal

## 2022-07-08 NOTE — ASSESSMENT & PLAN NOTE
ed IOL sooner than later due to level of cataract and monocular. patient significantly with discomfort will start pyridium and keflex   Will send urine for culture

## 2022-07-09 LAB — BACTERIA UR CULT: NORMAL

## 2022-07-11 ENCOUNTER — NURSE TRIAGE (OUTPATIENT)
Dept: OTHER | Facility: OTHER | Age: 57
End: 2022-07-11

## 2022-07-11 ENCOUNTER — TELEPHONE (OUTPATIENT)
Dept: FAMILY MEDICINE CLINIC | Facility: CLINIC | Age: 57
End: 2022-07-11

## 2022-07-11 ENCOUNTER — HOSPITAL ENCOUNTER (OUTPATIENT)
Dept: CT IMAGING | Facility: HOSPITAL | Age: 57
Discharge: HOME | End: 2022-07-11
Payer: COMMERCIAL

## 2022-07-11 DIAGNOSIS — R30.9 PAINFUL URINATION: ICD-10-CM

## 2022-07-11 DIAGNOSIS — R30.9 PAINFUL URINATION: Primary | ICD-10-CM

## 2022-07-11 DIAGNOSIS — R39.9 UTI SYMPTOMS: Primary | ICD-10-CM

## 2022-07-11 PROCEDURE — G1004 CDSM NDSC: HCPCS

## 2022-07-11 PROCEDURE — 74176 CT ABD & PELVIS W/O CONTRAST: CPT

## 2022-07-11 PROCEDURE — 87086 URINE CULTURE/COLONY COUNT: CPT | Performed by: NURSE PRACTITIONER

## 2022-07-11 NOTE — TELEPHONE ENCOUNTER
Patient called back again and is in extreme amount of pain and is asking please for a call back asap  Can someone please touch base with her? Thank you

## 2022-07-11 NOTE — TELEPHONE ENCOUNTER
Regarding: SLP-Appointment request   ----- Message from Chel Spencer RN sent at 7/11/2022  7:11 AM EDT -----  " I had a UTI last week and it is not going away "

## 2022-07-11 NOTE — TELEPHONE ENCOUNTER
Pt notified/aware of results Tequila placed CT scan STAT and urine culture for repeat  Pt will call now to schedule  Pt stated she has been in a lot of pain and also wanted me to let you know that she is also having a lot of burning/pain when having bowl movements wanted me to run this by you

## 2022-07-11 NOTE — TELEPHONE ENCOUNTER
Patient called again stating she is in a lot of pain and will like to know what the next step would be  Pt does not want to go to ER or Urgent Care      Please review and advise

## 2022-07-11 NOTE — TELEPHONE ENCOUNTER
JEANNIE Teague on call was made aware of patient's symptoms  Per PA Leonid Flavors, patient was made aware of that her u/a is negative, treating provider Roseanna Etienne will call the patient to follow up  Patient verbalized understanding of the advice

## 2022-07-11 NOTE — TELEPHONE ENCOUNTER
Reason for Disposition   [1] Taking antibiotic > 72 hours (3 days) for UTI AND [2] flank or lower back pain not improved    Answer Assessment - Initial Assessment Questions  1  ANTIBIOTIC: "What antibiotic are you taking?" "How many times per day?"      Keflex   2  DURATION: "When was the antibiotic started?"      7/8/22   3  MAIN SYMPTOM: "What is the main symptom you are concerned about?"      Lower abdominal pain   4  FEVER: "Do you have a fever?" If Yes, ask: "What is it, how was it measured, and when did it start?"      No   5   OTHER SYMPTOMS: "Do you have any other symptoms?" (e g , flank pain, vaginal discharge, blood in urine)      No    Protocols used: URINARY TRACT INFECTION ON ANTIBIOTIC FOLLOW-UP CALL [facility]

## 2022-07-11 NOTE — TELEPHONE ENCOUNTER
Pt called service at 7:37 this am  C/o continued UTI pain  It looks like culture is negative  You saw pt on 7/8  Not sure if you saw results yet  Please call

## 2022-07-11 NOTE — TELEPHONE ENCOUNTER
Pt called in because the antibiotic is not working  She will like something else called in because she is in a lot of pain   She was seen Friday for this UTI

## 2022-07-12 DIAGNOSIS — R39.9 UTI SYMPTOMS: ICD-10-CM

## 2022-07-13 LAB — BACTERIA UR CULT: NORMAL

## 2022-07-13 NOTE — TELEPHONE ENCOUNTER
Pt states she has pain with urination and pain in her right side back and fevers  She does not want to go to ED but I advised that would be best for her to have testing and pain control asap She will call her family to see if anyone can take her and if not she will call back

## 2022-07-14 ENCOUNTER — NURSE TRIAGE (OUTPATIENT)
Dept: OTHER | Facility: OTHER | Age: 57
End: 2022-07-14

## 2022-07-14 NOTE — TELEPHONE ENCOUNTER
Pt called in stating she has been having on going issues with UTI like sx's, chills and sweating at night  She has now woken up with a rash all over her body  Per pt it is hard for her to close her hand or bend her arm from the rash  Pt did not go to the ED last night like advised  Pt requesting a OV for today  However, pt only wants to be seen by Lemuel Jacobs  Pt aware she is not in the office today and only wants to be seen then by a female doctor  Pt aware that Dr Britney Plasencia is in the office today but I did not see any appts  Open yet for her today  Pt still refusing to be seen in the ED and would like a call back from the office ASAP to discuss getting seen today  Please advise

## 2022-07-14 NOTE — TELEPHONE ENCOUNTER
Regarding: Rash   ----- Message from Myrna Reed sent at 7/14/2022  7:27 AM EDT -----  "I went to the doctor the other day and I said I have a UTI  The PA told me I didn't but gave me an antibiotic anyway  Yesterday I was shivering, I was so cold, sweating and today I wake up and I have a rash all over my body  I can't even close my fingers   I'd like an appointment with Cullen Dunlap "

## 2022-07-14 NOTE — TELEPHONE ENCOUNTER
Lmom in detail -consent on file will try again  Advised pt to c/b to confirm she got message   I will try calling pt again at a later time

## 2022-07-14 NOTE — TELEPHONE ENCOUNTER
Reason for Disposition   Patient wants to be seen    Answer Assessment - Initial Assessment Questions  1  APPEARANCE of RASH: "Describe the rash "       Red small dots    2  LOCATION: "Where is the rash located?"       Per pt all over body   3  NUMBER: "How many spots are there?"      Unsure  4  SIZE: "How big are the spots?" (Inches, centimeters or compare to size of a coin)       Small red dots   5  ONSET: "When did the rash start?"      Started this morning  6  ITCHING: "Does the rash itch?" If Yes, ask: "How bad is the itch?"  (Scale 1-10; or mild, moderate, severe)     Itchy rash   7  PAIN: "Does the rash hurt?" If Yes, ask: "How bad is the pain?"  (Scale 1-10; or mild, moderate, severe)     Fingers and arms hurt to bend  8  OTHER SYMPTOMS: "Do you have any other symptoms?" (e g , fever)     Chills at night and sweating      Protocols used: RASH OR REDNESS - LOCALIZED-ADULT-OH

## 2022-07-14 NOTE — TELEPHONE ENCOUNTER
Pt called back I advised her what PCP has stated in message below  Pt states she would come into the office to see Dr Danish Sellers but I advised her we did not have any openings left  She states that she is not currently having any UTI symptoms? States she did d/c antibiotic when she developed rash  Pt is refusing to go to the ED  States "rash isn't bad"  Pt continued to tell me on call nurse advised her we had multiple openings with Dr Danish Sellers today which I advised her we no longer had any openings and per her request we had pcp address original message in which she advised pt to go to ED and that she should not wait until tomorrow  I informed pt ED would be able to treat allergic reaction (rash) right away with appropriate medication and run labs  Pt continue to refuse stating her rash isn't bad  Stated she never wants to see troy jain as she has not "picked up the phone once and called me in regards to any of this herself"  I again advised pt she needed to go to ED for further eval as it was the best option and she states " I will figure this out myself" and hung up the phone

## 2022-07-15 NOTE — TELEPHONE ENCOUNTER
Upon review of pts chart she did go to [facility] ER and was given adrenaline, IV fluids, benadryl, and labs showed high AST and ALT

## 2022-08-02 ENCOUNTER — OFFICE VISIT (OUTPATIENT)
Dept: FAMILY MEDICINE CLINIC | Facility: CLINIC | Age: 57
End: 2022-08-02
Payer: COMMERCIAL

## 2022-08-02 VITALS
HEIGHT: 63 IN | BODY MASS INDEX: 35.08 KG/M2 | OXYGEN SATURATION: 96 % | HEART RATE: 108 BPM | SYSTOLIC BLOOD PRESSURE: 120 MMHG | WEIGHT: 198 LBS | DIASTOLIC BLOOD PRESSURE: 80 MMHG | TEMPERATURE: 98.5 F

## 2022-08-02 DIAGNOSIS — J45.909 SEVERE ASTHMA, UNSPECIFIED WHETHER COMPLICATED, UNSPECIFIED WHETHER PERSISTENT: ICD-10-CM

## 2022-08-02 DIAGNOSIS — K21.9 GASTROESOPHAGEAL REFLUX DISEASE, UNSPECIFIED WHETHER ESOPHAGITIS PRESENT: ICD-10-CM

## 2022-08-02 DIAGNOSIS — E78.2 MULTIPLE-TYPE HYPERLIPIDEMIA: ICD-10-CM

## 2022-08-02 DIAGNOSIS — Z78.9 STATIN INTOLERANCE: ICD-10-CM

## 2022-08-02 DIAGNOSIS — E11.9 TYPE 2 DIABETES MELLITUS WITHOUT COMPLICATION, WITHOUT LONG-TERM CURRENT USE OF INSULIN (HCC): ICD-10-CM

## 2022-08-02 DIAGNOSIS — J32.4 CHRONIC PANSINUSITIS: ICD-10-CM

## 2022-08-02 DIAGNOSIS — K76.0 FATTY INFILTRATION OF LIVER: ICD-10-CM

## 2022-08-02 DIAGNOSIS — E55.9 VITAMIN D DEFICIENCY: Primary | ICD-10-CM

## 2022-08-02 PROBLEM — R39.9 UTI SYMPTOMS: Status: RESOLVED | Noted: 2022-07-08 | Resolved: 2022-08-02

## 2022-08-02 PROBLEM — K57.90 DIVERTICULOSIS: Status: ACTIVE | Noted: 2022-08-02

## 2022-08-02 PROCEDURE — 3074F SYST BP LT 130 MM HG: CPT | Performed by: PHYSICIAN ASSISTANT

## 2022-08-02 PROCEDURE — 99214 OFFICE O/P EST MOD 30 MIN: CPT | Performed by: PHYSICIAN ASSISTANT

## 2022-08-02 PROCEDURE — 3079F DIAST BP 80-89 MM HG: CPT | Performed by: PHYSICIAN ASSISTANT

## 2022-08-02 RX ORDER — CLARITHROMYCIN 500 MG/1
500 TABLET, COATED ORAL EVERY 12 HOURS SCHEDULED
Qty: 20 TABLET | Refills: 0 | Status: SHIPPED | OUTPATIENT
Start: 2022-08-02 | End: 2022-08-12

## 2022-08-02 NOTE — ASSESSMENT & PLAN NOTE
Patient on [address] and Singulair follows with allergist an asthma specialist   She will be getting drug testing soon to rule out antibiotic allergies  [de-identified] : At this time, due to MCL, partial PCL, possible meniscus tear, I recommended continued collateral hinged bracing.  He is instructed in home therapeutic modalities.  He will be reassessed in four weeks.

## 2022-08-02 NOTE — ASSESSMENT & PLAN NOTE
Patient allergic to many antibiotics patient has had success with Biaxin in the past prescription given follow as directed

## 2022-08-02 NOTE — ASSESSMENT & PLAN NOTE
Patient due for fasting CMP and A1c    Lab Results   Component Value Date    HGBA1C 6 8 (H) 01/31/2022

## 2022-08-02 NOTE — PROGRESS NOTES
Assessment and Plan:    Problem List Items Addressed This Visit        Digestive    Fatty infiltration of liver     Still evident on CT done for workup of pelvic pain and patient did has significant LFT elevation secondary to Keflex reaction  Repeat LFTs  Gastroesophageal reflux disease     Patient will be following up with Dr Stanford Castleman soon  Endocrine    Type 2 diabetes mellitus without complication, without long-term current use of insulin ([facility] )     Patient due for fasting CMP and A1c  Lab Results   Component Value Date    HGBA1C 6 8 (H) 01/31/2022               Respiratory    Chronic pansinusitis     Patient allergic to many antibiotics patient has had success with Biaxin in the past prescription given follow as directed  Relevant Medications    clarithromycin (BIAXIN) 500 mg tablet    Asthma     Patient on Dupixent and Singulair follows with allergist an asthma specialist   She will be getting drug testing soon to rule out antibiotic allergies  Other    Multiple-type hyperlipidemia     Due for fasting lipid  Order reprinted  Statin intolerance    Vitamin D deficiency - Primary     Check D level  Patient has stopped her 38435 units once weekly  Relevant Orders    Vitamin D 25 hydroxy                 Diagnoses and all orders for this visit:    Vitamin D deficiency  -     Vitamin D 25 hydroxy; Future    Multiple-type hyperlipidemia    Chronic pansinusitis  -     clarithromycin (BIAXIN) 500 mg tablet; Take 1 tablet (500 mg total) by mouth every 12 (twelve) hours for 10 days    Gastroesophageal reflux disease, unspecified whether esophagitis present    Fatty infiltration of liver    Type 2 diabetes mellitus without complication, without long-term current use of insulin ([facility])    Statin intolerance    Severe asthma, unspecified whether complicated, unspecified whether persistent            Subjective:      Patient ID: Lamont Heredia is a 61 y o  female  CC:    Chief Complaint   Patient presents with    Follow-up     Patient present today for a follow up after being seen at the Emergency Room due to an Allergic reaction Sulfa (Cephalexin) antibiotic  HPI:    Patient presented on 07/08 for pain and pressure when urinating  She sought HN  She was placed on Keflex any urine culture was run  She started to feel not well with rash and some shortness of breath urine culture came back essentially negative she was encouraged to go to the emergency room which she eventually did at [facility] and did have a very severe reaction to Keflex  Patient states that she is allergic to penicillin however there is only a 3% crossover between penicillin and cephalexin  Penicillin allergy was actually not on patient's chart even today  She also has a allergic reaction to sulfa  [facility] ended up needing to give her DuoNeb epinephrine Benadryl IV fluids and steroids  Patient states that this reaction and everything that went along with it has totally resolved as well as all the symptoms she initially made the appointment for  Patient with significant LFT elevations in the hospital     Currently she is having a lot of thick yellow head congestion she did speak with her allergist and her allergist told her to have us evaluate her and give her an antibiotic if needed  Patient has has success Biaxin in the past   Her allergist will be setting up information for her to schedule drug allergy testing to see which medication she really is allergic to  Also patient has not followed up with Endocrinology she was confused and she started on an injection that was daily for osteoporosis developed a reaction and did not hear from them since then but she was actually supposed to call them to give them an update 2 weeks after they last spoke in March  Patient is due for fasting blood work        The following portions of the patient's history were reviewed and updated as appropriate: allergies, current medications, past family history, past medical history, past social history, past surgical history and problem list       Review of Systems   Constitutional: Negative  HENT: Positive for congestion, ear pain, sinus pressure and sinus pain  Eyes: Negative  Respiratory: Positive for cough  Cardiovascular: Negative  Gastrointestinal: Negative  Endocrine: Negative  Genitourinary: Negative  Musculoskeletal: Negative  Skin: Negative  Allergic/Immunologic: Negative  Neurological: Negative  Hematological: Negative  Psychiatric/Behavioral: Negative  Data to review:       Objective:    Vitals:    08/02/22 1813   BP: 120/80   BP Location: Right arm   Patient Position: Sitting   Cuff Size: Large   Pulse: (!) 108   Temp: 98 5 °F (36 9 °C)   TempSrc: Tympanic   SpO2: 96%   Weight: 89 8 kg (198 lb)   Height: 5' 3" (1 6 m)        Physical Exam  Vitals and nursing note reviewed  Constitutional:       Appearance: Normal appearance  She is well-developed  HENT:      Head: Normocephalic and atraumatic  Right Ear: Hearing, ear canal and external ear normal  Tympanic membrane is injected  Left Ear: Hearing, ear canal and external ear normal    Eyes:      General: Lids are normal       Conjunctiva/sclera: Conjunctivae normal       Pupils: Pupils are equal, round, and reactive to light  Cardiovascular:      Rate and Rhythm: Normal rate and regular rhythm  Heart sounds: No murmur heard  Pulmonary:      Effort: Pulmonary effort is normal       Breath sounds: Normal breath sounds  Lymphadenopathy:      Cervical: No cervical adenopathy  Skin:     General: Skin is warm and dry  Neurological:      General: No focal deficit present  Mental Status: She is alert  Coordination: Coordination is intact     Psychiatric:         Mood and Affect: Mood normal          Behavior: Behavior normal  Behavior is cooperative  Thought Content:  Thought content normal          Judgment: Judgment normal

## 2022-08-02 NOTE — ASSESSMENT & PLAN NOTE
Still evident on CT done for workup of pelvic pain and patient did has significant LFT elevation secondary to Keflex reaction  Repeat LFTs

## 2022-08-02 NOTE — PATIENT INSTRUCTIONS
Problem List Items Addressed This Visit          Digestive    Fatty infiltration of liver     Still evident on CT done for workup of pelvic pain and patient did has significant LFT elevation secondary to Keflex reaction  Repeat LFTs  Gastroesophageal reflux disease     Patient will be following up with Dr Bhargavi Contreras soon  Endocrine    Type 2 diabetes mellitus without complication, without long-term current use of insulin ([facility] )     Patient due for fasting CMP and A1c  Lab Results   Component Value Date    HGBA1C 6 8 (H) 01/31/2022               Respiratory    Chronic pansinusitis     Patient allergic to many antibiotics patient has had success with Biaxin in the past prescription given follow as directed  Relevant Medications    clarithromycin (BIAXIN) 500 mg tablet    Asthma     Patient on Dupixent and Singulair follows with allergist an asthma specialist   She will be getting drug testing soon to rule out antibiotic allergies  Other    Multiple-type hyperlipidemia     Due for fasting lipid  Order reprinted  Statin intolerance    Vitamin D deficiency - Primary     Check D level  Patient has stopped her 17668 units once weekly             Relevant Orders    Vitamin D 25 hydroxy

## 2022-08-05 ENCOUNTER — VBI (OUTPATIENT)
Dept: ADMINISTRATIVE | Facility: OTHER | Age: 57
End: 2022-08-05

## 2022-08-09 ENCOUNTER — LAB (OUTPATIENT)
Dept: LAB | Facility: CLINIC | Age: 57
End: 2022-08-09
Payer: COMMERCIAL

## 2022-08-09 DIAGNOSIS — E55.9 VITAMIN D DEFICIENCY: ICD-10-CM

## 2022-08-09 DIAGNOSIS — E78.2 MULTIPLE-TYPE HYPERLIPIDEMIA: ICD-10-CM

## 2022-08-09 DIAGNOSIS — E11.9 TYPE 2 DIABETES MELLITUS WITHOUT COMPLICATION, WITHOUT LONG-TERM CURRENT USE OF INSULIN (HCC): ICD-10-CM

## 2022-08-09 LAB
25(OH)D3 SERPL-MCNC: 28.7 NG/ML (ref 30–100)
ALBUMIN SERPL BCP-MCNC: 3.2 G/DL (ref 3.5–5)
ALP SERPL-CCNC: 80 U/L (ref 46–116)
ALT SERPL W P-5'-P-CCNC: 36 U/L (ref 12–78)
ANION GAP SERPL CALCULATED.3IONS-SCNC: 1 MMOL/L (ref 4–13)
AST SERPL W P-5'-P-CCNC: 25 U/L (ref 5–45)
BILIRUB SERPL-MCNC: 0.45 MG/DL (ref 0.2–1)
BUN SERPL-MCNC: 10 MG/DL (ref 5–25)
CALCIUM ALBUM COR SERPL-MCNC: 9.7 MG/DL (ref 8.3–10.1)
CALCIUM SERPL-MCNC: 9.1 MG/DL (ref 8.3–10.1)
CHLORIDE SERPL-SCNC: 107 MMOL/L (ref 96–108)
CHOLEST SERPL-MCNC: 210 MG/DL
CO2 SERPL-SCNC: 27 MMOL/L (ref 21–32)
CREAT SERPL-MCNC: 0.66 MG/DL (ref 0.6–1.3)
EST. AVERAGE GLUCOSE BLD GHB EST-MCNC: 140 MG/DL
GFR SERPL CREATININE-BSD FRML MDRD: 98 ML/MIN/1.73SQ M
GLUCOSE P FAST SERPL-MCNC: 105 MG/DL (ref 65–99)
HBA1C MFR BLD: 6.5 %
HDLC SERPL-MCNC: 38 MG/DL
LDLC SERPL CALC-MCNC: 116 MG/DL (ref 0–100)
POTASSIUM SERPL-SCNC: 4.2 MMOL/L (ref 3.5–5.3)
PROT SERPL-MCNC: 9.7 G/DL (ref 6.4–8.4)
SODIUM SERPL-SCNC: 135 MMOL/L (ref 135–147)
TRIGL SERPL-MCNC: 278 MG/DL

## 2022-08-09 PROCEDURE — 82306 VITAMIN D 25 HYDROXY: CPT

## 2022-08-09 PROCEDURE — 83036 HEMOGLOBIN GLYCOSYLATED A1C: CPT

## 2022-08-09 PROCEDURE — 80061 LIPID PANEL: CPT

## 2022-08-09 PROCEDURE — 3044F HG A1C LEVEL LT 7.0%: CPT | Performed by: PHYSICIAN ASSISTANT

## 2022-08-09 PROCEDURE — 36415 COLL VENOUS BLD VENIPUNCTURE: CPT

## 2022-08-09 PROCEDURE — 80053 COMPREHEN METABOLIC PANEL: CPT

## 2022-08-11 DIAGNOSIS — E78.2 MULTIPLE-TYPE HYPERLIPIDEMIA: ICD-10-CM

## 2022-08-11 DIAGNOSIS — E55.9 VITAMIN D DEFICIENCY: ICD-10-CM

## 2022-08-11 DIAGNOSIS — E11.9 TYPE 2 DIABETES MELLITUS WITHOUT COMPLICATION, WITHOUT LONG-TERM CURRENT USE OF INSULIN (HCC): Primary | ICD-10-CM

## 2022-08-11 NOTE — RESULT ENCOUNTER NOTE
Please let pt know her labs look much better! 1  A1C is down from 6 8 to 6 5 with a fasting glucose of 105  2 Bad LDL chol  Is down from 172 to 116 and trigs are down from 445 to 278   3  Vit D has improved from 8 6 to 28  I would like her to start OTC Vit D 2,000 IU daily, continue watching her fat/chol and sugars  I have placed an order for blood work to be done in 4 months and then ask for apt 1 week later to review  Thank you

## 2022-08-12 NOTE — RESULT ENCOUNTER NOTE
She just finished a very powerful antibiotic  If we give her another one now she is at high risk to develop a c diff infection  Please make sure she is using her flonase and she can also add in afrin for one week

## 2022-08-13 PROCEDURE — 3066F NEPHROPATHY DOC TX: CPT | Performed by: PHYSICIAN ASSISTANT

## 2022-08-15 PROCEDURE — 4010F ACE/ARB THERAPY RXD/TAKEN: CPT | Performed by: PHYSICIAN ASSISTANT

## 2022-08-17 ENCOUNTER — TELEPHONE (OUTPATIENT)
Dept: FAMILY MEDICINE CLINIC | Facility: CLINIC | Age: 57
End: 2022-08-17

## 2022-08-17 DIAGNOSIS — J32.4 CHRONIC PANSINUSITIS: Primary | ICD-10-CM

## 2022-08-17 RX ORDER — DOXYCYCLINE HYCLATE 100 MG/1
100 CAPSULE ORAL EVERY 12 HOURS SCHEDULED
Qty: 20 CAPSULE | Refills: 0 | Status: SHIPPED | OUTPATIENT
Start: 2022-08-17 | End: 2022-08-27

## 2022-08-17 NOTE — TELEPHONE ENCOUNTER
----- Message from Teresa Payan PA-C sent at 8/12/2022  7:41 AM EDT -----  She just finished a very powerful antibiotic  If we give her another one now she is at high risk to develop a c diff infection  Please make sure she is using her flonase and she can also add in afrin for one week

## 2022-08-17 NOTE — TELEPHONE ENCOUNTER
Patient was advised of the below mentioned recommendations  Patient understands and will like the Afrin to be sent to [facility] pharmacy on [address]  Patient also advises that she is still experiencing stuffiness and when she blows her nose she discharges a thick dark yellow to greenish mucus

## 2022-10-05 ENCOUNTER — RA CDI HCC (OUTPATIENT)
Dept: OTHER | Facility: HOSPITAL | Age: 57
End: 2022-10-05

## 2022-10-05 NOTE — PROGRESS NOTES
[facility]  coding opportunities       Chart reviewed, no opportunity found: CHART REVIEWED, NO OPPORTUNITY FOUND     Patients Insurance     Commercial Insurance: [employer]

## 2022-12-13 ENCOUNTER — VBI (OUTPATIENT)
Dept: ADMINISTRATIVE | Facility: OTHER | Age: 57
End: 2022-12-13

## 2023-01-12 ENCOUNTER — VBI (OUTPATIENT)
Dept: ADMINISTRATIVE | Facility: OTHER | Age: 58
End: 2023-01-12

## 2023-01-12 NOTE — TELEPHONE ENCOUNTER
01/12/23 8:32 AM     [employer] SmartForm used to document caregap status      [facility]

## 2023-02-25 DIAGNOSIS — R80.9 MICROALBUMINURIA: ICD-10-CM

## 2023-02-27 RX ORDER — LISINOPRIL 2.5 MG/1
TABLET ORAL
Qty: 90 TABLET | Refills: 1 | Status: SHIPPED | OUTPATIENT
Start: 2023-02-27

## 2023-04-03 ENCOUNTER — TELEPHONE (OUTPATIENT)
Dept: FAMILY MEDICINE CLINIC | Facility: CLINIC | Age: 58
End: 2023-04-03

## 2023-04-03 NOTE — TELEPHONE ENCOUNTER
Patient would like a referral st [facility]'s weight Management  She has insurance now  Please call her when ready

## 2023-04-04 DIAGNOSIS — E11.9 TYPE 2 DIABETES MELLITUS WITHOUT COMPLICATION, WITHOUT LONG-TERM CURRENT USE OF INSULIN (HCC): Primary | ICD-10-CM

## 2023-04-04 NOTE — TELEPHONE ENCOUNTER
Justice  Pt was due for labs in December,is diabetic and has 10 open care gaps  She neds to go for her fasting labs and then make an apt to review and we can place order then

## 2023-04-07 ENCOUNTER — APPOINTMENT (OUTPATIENT)
Dept: LAB | Facility: MEDICAL CENTER | Age: 58
End: 2023-04-07

## 2023-04-07 DIAGNOSIS — E55.9 VITAMIN D DEFICIENCY: ICD-10-CM

## 2023-04-07 DIAGNOSIS — E78.2 MULTIPLE-TYPE HYPERLIPIDEMIA: ICD-10-CM

## 2023-04-07 DIAGNOSIS — E11.9 TYPE 2 DIABETES MELLITUS WITHOUT COMPLICATION, WITHOUT LONG-TERM CURRENT USE OF INSULIN (HCC): ICD-10-CM

## 2023-04-07 LAB
25(OH)D3 SERPL-MCNC: 14.5 NG/ML (ref 30–100)
ALBUMIN SERPL BCP-MCNC: 3.5 G/DL (ref 3.5–5)
ALP SERPL-CCNC: 76 U/L (ref 46–116)
ALT SERPL W P-5'-P-CCNC: 50 U/L (ref 12–78)
ANION GAP SERPL CALCULATED.3IONS-SCNC: 0 MMOL/L (ref 4–13)
AST SERPL W P-5'-P-CCNC: 30 U/L (ref 5–45)
BILIRUB SERPL-MCNC: 0.51 MG/DL (ref 0.2–1)
BUN SERPL-MCNC: 11 MG/DL (ref 5–25)
CALCIUM SERPL-MCNC: 9.4 MG/DL (ref 8.3–10.1)
CHLORIDE SERPL-SCNC: 105 MMOL/L (ref 96–108)
CHOLEST SERPL-MCNC: 206 MG/DL
CO2 SERPL-SCNC: 29 MMOL/L (ref 21–32)
CREAT SERPL-MCNC: 0.55 MG/DL (ref 0.6–1.3)
CREAT UR-MCNC: 115 MG/DL
GFR SERPL CREATININE-BSD FRML MDRD: 103 ML/MIN/1.73SQ M
GLUCOSE P FAST SERPL-MCNC: 98 MG/DL (ref 65–99)
HDLC SERPL-MCNC: 40 MG/DL
LDLC SERPL CALC-MCNC: 117 MG/DL (ref 0–100)
MICROALBUMIN UR-MCNC: 19.5 MG/L (ref 0–20)
MICROALBUMIN/CREAT 24H UR: 17 MG/G CREATININE (ref 0–30)
POTASSIUM SERPL-SCNC: 4.1 MMOL/L (ref 3.5–5.3)
PROT SERPL-MCNC: 10.2 G/DL (ref 6.4–8.4)
SODIUM SERPL-SCNC: 134 MMOL/L (ref 135–147)
TRIGL SERPL-MCNC: 243 MG/DL

## 2023-04-08 LAB
EST. AVERAGE GLUCOSE BLD GHB EST-MCNC: 146 MG/DL
HBA1C MFR BLD: 6.7 %

## 2023-04-10 PROBLEM — J45.50 SEVERE PERSISTENT ASTHMA WITHOUT COMPLICATION: Status: ACTIVE | Noted: 2017-12-12

## 2023-04-10 PROBLEM — E87.1 HYPONATREMIA: Status: ACTIVE | Noted: 2023-04-10

## 2023-04-10 PROBLEM — R04.0 FREQUENT NOSEBLEEDS: Status: ACTIVE | Noted: 2023-04-10

## 2023-04-10 PROBLEM — R53.82 CHRONIC FATIGUE: Status: ACTIVE | Noted: 2023-04-10

## 2023-05-10 DIAGNOSIS — E55.9 VITAMIN D DEFICIENCY: ICD-10-CM

## 2023-05-10 RX ORDER — ERGOCALCIFEROL 1.25 MG/1
CAPSULE ORAL
Qty: 12 CAPSULE | Refills: 2 | Status: SHIPPED | OUTPATIENT
Start: 2023-05-10

## 2023-05-12 DIAGNOSIS — E78.2 MULTIPLE-TYPE HYPERLIPIDEMIA: ICD-10-CM

## 2023-05-12 RX ORDER — EZETIMIBE 10 MG/1
TABLET ORAL
Qty: 90 TABLET | Refills: 2 | Status: SHIPPED | OUTPATIENT
Start: 2023-05-12

## 2023-06-03 DIAGNOSIS — E55.9 VITAMIN D DEFICIENCY: ICD-10-CM

## 2023-06-05 RX ORDER — ERGOCALCIFEROL 1.25 MG/1
CAPSULE ORAL
Qty: 12 CAPSULE | Refills: 3 | Status: SHIPPED | OUTPATIENT
Start: 2023-06-05

## 2023-06-20 ENCOUNTER — OFFICE VISIT (OUTPATIENT)
Dept: FAMILY MEDICINE CLINIC | Facility: CLINIC | Age: 58
End: 2023-06-20
Payer: COMMERCIAL

## 2023-06-20 VITALS
DIASTOLIC BLOOD PRESSURE: 84 MMHG | HEIGHT: 63 IN | WEIGHT: 209 LBS | SYSTOLIC BLOOD PRESSURE: 120 MMHG | BODY MASS INDEX: 37.03 KG/M2 | HEART RATE: 88 BPM

## 2023-06-20 DIAGNOSIS — R79.89 ABNORMAL CBC: ICD-10-CM

## 2023-06-20 DIAGNOSIS — R61 UNEXPLAINED NIGHT SWEATS: Primary | ICD-10-CM

## 2023-06-20 DIAGNOSIS — Z79.52 LONG TERM (CURRENT) USE OF SYSTEMIC STEROIDS: ICD-10-CM

## 2023-06-20 DIAGNOSIS — E11.9 TYPE 2 DIABETES MELLITUS WITHOUT COMPLICATION, WITHOUT LONG-TERM CURRENT USE OF INSULIN (HCC): ICD-10-CM

## 2023-06-20 DIAGNOSIS — E66.9 OBESITY, UNSPECIFIED OBESITY SEVERITY, UNSPECIFIED OBESITY TYPE: ICD-10-CM

## 2023-06-20 PROCEDURE — 99214 OFFICE O/P EST MOD 30 MIN: CPT | Performed by: PHYSICIAN ASSISTANT

## 2023-06-20 NOTE — PATIENT INSTRUCTIONS
1  Unexplained night sweats  -     CBC and differential  -     Comprehensive metabolic panel  -     TSH, 3rd generation with Free T4 reflex  -     Hemoglobin A1C  -     Cortisol Level,7-9 AM Specimen; Future  -     ACTH; Future  -     Cortisol Level,7-9 AM Specimen  -     ACTH    2  Type 2 diabetes mellitus without complication, without long-term current use of insulin (HCC)  -     Hemoglobin A1C    3  Long term (current) use of systemic steroids  -     CBC and differential  -     Comprehensive metabolic panel  -     TSH, 3rd generation with Free T4 reflex  -     Hemoglobin A1C  -     Cortisol Level,7-9 AM Specimen; Future  -     ACTH; Future  -     Cortisol Level,7-9 AM Specimen  -     ACTH    4  Abnormal CBC  -     CBC and differential    5  Obesity, unspecified obesity severity, unspecified obesity type  -     Ambulatory Referral to Weight Management;  Future

## 2023-06-20 NOTE — PROGRESS NOTES
Name: Booker Hernandez      : 1965      MRN: [id number]  Encounter Provider: Alex Hall PA-C  Encounter Date: 2023   Encounter department: [address] [place] [address] PRIMARY CARE   Patient has constellation of symptoms that might be related to decreasing her overall chronic 25-year steroid use for her severe asthma over time  Recently went from 4 mg to 2 mg daily  I am concerned for adrenal insufficiency and therefore have ordered blood work for work-up  Assessment & Plan   1  Unexplained night sweats  -     CBC and differential  -     Comprehensive metabolic panel  -     TSH, 3rd generation with Free T4 reflex  -     Hemoglobin A1C  -     Cortisol Level,7-9 AM Specimen; Future  -     ACTH; Future  -     Cortisol Level,7-9 AM Specimen  -     ACTH    2  Type 2 diabetes mellitus without complication, without long-term current use of insulin (HCC)  -     Hemoglobin A1C    3  Long term (current) use of systemic steroids  -     CBC and differential  -     Comprehensive metabolic panel  -     TSH, 3rd generation with Free T4 reflex  -     Hemoglobin A1C  -     Cortisol Level,7-9 AM Specimen; Future  -     ACTH; Future  -     Cortisol Level,7-9 AM Specimen  -     ACTH    4  Abnormal CBC  -     CBC and differential    5  Obesity, unspecified obesity severity, unspecified obesity type  -     Ambulatory Referral to Weight Management; Future      BMI Counseling: Body mass index is 37 02 kg/m²  The BMI is above normal  Nutrition recommendations include decreasing portion sizes, encouraging healthy choices of fruits and vegetables, decreasing fast food intake, consuming healthier snacks, limiting drinks that contain sugar, moderation in carbohydrate intake, increasing intake of lean protein, reducing intake of saturated and trans fat and reducing intake of cholesterol  Exercise recommendations include exercising 3-5 times per week  No pharmacotherapy was ordered   Rationale for BMI follow-up plan is due to patient being overweight or obese  Subjective      2-3 weeks or more maybe one month that pt has been more tired than usual  Needing naps, sweating at night, feeling lightheaded and waking up wet and during the day  Needing to clip up her hair  She has been on chronic steroids for roughly 25 years and has been undergoing a very slow taper  Recently she started Dupixent and her asthma and allergies are greatly improved and her current provider has been decreasing her steroids even more  2 months ago she was changed from 4 mg daily to 2 mg daily and this is when she began to not feel well  Patient also is asking about Mounjaro for weight loss and diabetes as a friend of hers states that he feels great on it and has been able to decrease his insulin  I did explain to her that her insurance will require multiple other medications to be tried and failed before that they would cover her taking this  Patient is not on any current diabetic medication and declined metformin for the last visit  She did have a prescription for weight management before losing her insurance during her divorce and would like to pursue this again with a new order  Review of Systems   Constitutional: Positive for diaphoresis and fatigue  HENT: Negative  Eyes: Negative  Respiratory: Negative  Cardiovascular: Negative  Gastrointestinal: Negative  Endocrine: Negative  Genitourinary: Negative  Musculoskeletal: Negative  Skin: Negative  Allergic/Immunologic: Negative  Neurological: Positive for light-headedness  Hematological: Negative  Psychiatric/Behavioral: Negative          Current Outpatient Medications on File Prior to Visit   Medication Sig   • ADVAIR DISKUS 500-50 MCG/DOSE inhaler Inhale 1 puff 2 (two) times a day    • albuterol (2 5 mg/3 mL) 0 083 % nebulizer solution TK 3 ML BY NEBULIZATION BID PRN FOR WHEEZING   • albuterol (ACCUNEB) 1 25 MG/3ML nebulizer solution Inhale   • Blood Glucose "Monitoring Suppl (OneTouch Verio Reflect) w/Device KIT Check blood sugars once daily  Please substitute with appropriate alternative as covered by patient's insurance  Dx: E11 65   • BREO ELLIPTA 200-25 MCG/INH inhaler Inhale 1 puff daily    • DEXILANT 60 MG capsule Take 60 mg by mouth daily    • dupilumab (DUPIXENT) subcutaneous injection Inject 300 mg under the skin once    • ergocalciferol (VITAMIN D2) 50,000 units TAKE 1 CAPSULE BY MOUTH ONE TIME PER WEEK   • ezetimibe (ZETIA) 10 mg tablet TAKE 1 TABLET BY MOUTH EVERY DAY   • fexofenadine (ALLEGRA) 180 MG tablet Take 1 tablet (180 mg total) by mouth daily   • fluticasone (FLONASE) 50 mcg/act nasal spray 1 spray into each nostril daily   • glucose blood (OneTouch Verio) test strip Check blood sugars once daily  Please substitute with appropriate alternative as covered by patient's insurance  Dx: E11 65   • montelukast (SINGULAIR) 10 mg tablet Take 10 mg by mouth daily at bedtime    • OneTouch Delica Lancets 03A MISC Check blood sugars once daily  Please substitute with appropriate alternative as covered by patient's insurance  Dx: E11 65   • SPIRIVA RESPIMAT 2 5 MCG/ACT AERS inhaler Inhale 2 puffs daily as needed    • VENTOLIN  (90 Base) MCG/ACT inhaler INL 2 PFS PO Q 4 H PRF WHZ   • EPINEPHrine (EPIPEN) 0 3 mg/0 3 mL SOAJ Inject 0 3 mL (0 3 mg total) into a muscle once for 1 dose   • lisinopril (ZESTRIL) 2 5 mg tablet TAKE 1 TABLET BY MOUTH EVERY DAY (Patient not taking: Reported on 6/20/2023)   • [DISCONTINUED] Abaloparatide (Tymlos) 3120 MCG/1 56ML SOPN INJECT 80 MCG UNDER THE SKIN DAILY (DISCARD 30 DAYS AFTER INITIAL USE)       Objective     /84   Pulse 88   Ht 5' 3\" (1 6 m)   Wt 94 8 kg (209 lb)   BMI 37 02 kg/m²     Physical Exam  Vitals and nursing note reviewed  Constitutional:       General: She is not in acute distress  Appearance: She is well-developed  She is not diaphoretic  HENT:      Head: Normocephalic and atraumatic   " Eyes:      General:         Right eye: No discharge  Left eye: No discharge  Conjunctiva/sclera: Conjunctivae normal    Neck:      Vascular: No carotid bruit  Cardiovascular:      Rate and Rhythm: Normal rate and regular rhythm  Heart sounds: Normal heart sounds  No murmur heard  No friction rub  No gallop  Pulmonary:      Effort: Pulmonary effort is normal  No respiratory distress  Breath sounds: Normal breath sounds  No wheezing or rales  Musculoskeletal:      Cervical back: Neck supple  Skin:     General: Skin is warm and dry  Neurological:      Mental Status: She is alert and oriented to person, place, and time     Psychiatric:         Judgment: Judgment normal        Ignacio Perez PA-C

## 2023-07-21 LAB
ALBUMIN SERPL-MCNC: 3.7 G/DL (ref 3.6–5.1)
ALBUMIN/GLOB SERPL: 0.6 (CALC) (ref 1–2.5)
ALP SERPL-CCNC: 71 U/L (ref 37–153)
ALT SERPL-CCNC: 27 U/L (ref 6–29)
AST SERPL-CCNC: 18 U/L (ref 10–35)
BASOPHILS # BLD AUTO: 90 CELLS/UL (ref 0–200)
BASOPHILS NFR BLD AUTO: 1.4 %
BILIRUB SERPL-MCNC: 0.3 MG/DL (ref 0.2–1.2)
BUN SERPL-MCNC: 12 MG/DL (ref 7–25)
BUN/CREAT SERPL: ABNORMAL (CALC) (ref 6–22)
CALCIUM SERPL-MCNC: 9 MG/DL (ref 8.6–10.4)
CHLORIDE SERPL-SCNC: 103 MMOL/L (ref 98–110)
CO2 SERPL-SCNC: 28 MMOL/L (ref 20–32)
CORTIS AM PEAK SERPL-MCNC: 12.4 MCG/DL
CREAT SERPL-MCNC: 0.5 MG/DL (ref 0.5–1.03)
EOSINOPHIL # BLD AUTO: 358 CELLS/UL (ref 15–500)
EOSINOPHIL NFR BLD AUTO: 5.6 %
ERYTHROCYTE [DISTWIDTH] IN BLOOD BY AUTOMATED COUNT: 13.8 % (ref 11–15)
GFR/BSA.PRED SERPLBLD CYS-BASED-ARV: 109 ML/MIN/1.73M2
GLOBULIN SER CALC-MCNC: 5.8 G/DL (CALC) (ref 1.9–3.7)
GLUCOSE SERPL-MCNC: 153 MG/DL (ref 65–99)
HCT VFR BLD AUTO: 37.1 % (ref 35–45)
HGB BLD-MCNC: 12.4 G/DL (ref 11.7–15.5)
LYMPHOCYTES # BLD AUTO: 2822 CELLS/UL (ref 850–3900)
LYMPHOCYTES NFR BLD AUTO: 44.1 %
MCH RBC QN AUTO: 30.4 PG (ref 27–33)
MCHC RBC AUTO-ENTMCNC: 33.4 G/DL (ref 32–36)
MCV RBC AUTO: 90.9 FL (ref 80–100)
MONOCYTES # BLD AUTO: 474 CELLS/UL (ref 200–950)
MONOCYTES NFR BLD AUTO: 7.4 %
NEUTROPHILS # BLD AUTO: 2656 CELLS/UL (ref 1500–7800)
NEUTROPHILS NFR BLD AUTO: 41.5 %
PLATELET # BLD AUTO: 194 THOUSAND/UL (ref 140–400)
PMV BLD REES-ECKER: 12.3 FL (ref 7.5–12.5)
POTASSIUM SERPL-SCNC: 4.4 MMOL/L (ref 3.5–5.3)
PROT SERPL-MCNC: 9.5 G/DL (ref 6.1–8.1)
RBC # BLD AUTO: 4.08 MILLION/UL (ref 3.8–5.1)
SODIUM SERPL-SCNC: 134 MMOL/L (ref 135–146)
TSH SERPL-ACNC: 2.4 MIU/L (ref 0.4–4.5)
WBC # BLD AUTO: 6.4 THOUSAND/UL (ref 3.8–10.8)

## 2023-07-26 LAB
ACTH PLAS-MCNC: 15 PG/ML (ref 6–50)
ALBUMIN SERPL-MCNC: 3.7 G/DL (ref 3.6–5.1)
ALBUMIN/GLOB SERPL: 0.6 (CALC) (ref 1–2.5)
ALP SERPL-CCNC: 71 U/L (ref 37–153)
ALT SERPL-CCNC: 27 U/L (ref 6–29)
AST SERPL-CCNC: 18 U/L (ref 10–35)
BASOPHILS # BLD AUTO: 90 CELLS/UL (ref 0–200)
BASOPHILS NFR BLD AUTO: 1.4 %
BILIRUB SERPL-MCNC: 0.3 MG/DL (ref 0.2–1.2)
BUN SERPL-MCNC: 12 MG/DL (ref 7–25)
BUN/CREAT SERPL: ABNORMAL (CALC) (ref 6–22)
CALCIUM SERPL-MCNC: 9 MG/DL (ref 8.6–10.4)
CHLORIDE SERPL-SCNC: 103 MMOL/L (ref 98–110)
CO2 SERPL-SCNC: 28 MMOL/L (ref 20–32)
CORTIS AM PEAK SERPL-MCNC: 12.4 MCG/DL
CREAT SERPL-MCNC: 0.5 MG/DL (ref 0.5–1.03)
EOSINOPHIL # BLD AUTO: 358 CELLS/UL (ref 15–500)
EOSINOPHIL NFR BLD AUTO: 5.6 %
ERYTHROCYTE [DISTWIDTH] IN BLOOD BY AUTOMATED COUNT: 13.8 % (ref 11–15)
GFR/BSA.PRED SERPLBLD CYS-BASED-ARV: 109 ML/MIN/1.73M2
GLOBULIN SER CALC-MCNC: 5.8 G/DL (CALC) (ref 1.9–3.7)
GLUCOSE SERPL-MCNC: 153 MG/DL (ref 65–99)
HCT VFR BLD AUTO: 37.1 % (ref 35–45)
HGB BLD-MCNC: 12.4 G/DL (ref 11.7–15.5)
LYMPHOCYTES # BLD AUTO: 2822 CELLS/UL (ref 850–3900)
LYMPHOCYTES NFR BLD AUTO: 44.1 %
MCH RBC QN AUTO: 30.4 PG (ref 27–33)
MCHC RBC AUTO-ENTMCNC: 33.4 G/DL (ref 32–36)
MCV RBC AUTO: 90.9 FL (ref 80–100)
MONOCYTES # BLD AUTO: 474 CELLS/UL (ref 200–950)
MONOCYTES NFR BLD AUTO: 7.4 %
NEUTROPHILS # BLD AUTO: 2656 CELLS/UL (ref 1500–7800)
NEUTROPHILS NFR BLD AUTO: 41.5 %
PLATELET # BLD AUTO: 194 THOUSAND/UL (ref 140–400)
PMV BLD REES-ECKER: 12.3 FL (ref 7.5–12.5)
POTASSIUM SERPL-SCNC: 4.4 MMOL/L (ref 3.5–5.3)
PROT SERPL-MCNC: 9.5 G/DL (ref 6.1–8.1)
RBC # BLD AUTO: 4.08 MILLION/UL (ref 3.8–5.1)
SODIUM SERPL-SCNC: 134 MMOL/L (ref 135–146)
TSH SERPL-ACNC: 2.4 MIU/L (ref 0.4–4.5)
WBC # BLD AUTO: 6.4 THOUSAND/UL (ref 3.8–10.8)

## 2023-08-03 ENCOUNTER — CONSULT (OUTPATIENT)
Dept: BARIATRICS | Facility: CLINIC | Age: 58
End: 2023-08-03
Payer: COMMERCIAL

## 2023-08-03 VITALS
RESPIRATION RATE: 16 BRPM | HEIGHT: 63 IN | SYSTOLIC BLOOD PRESSURE: 128 MMHG | BODY MASS INDEX: 37.17 KG/M2 | DIASTOLIC BLOOD PRESSURE: 76 MMHG | WEIGHT: 209.8 LBS | HEART RATE: 98 BPM

## 2023-08-03 DIAGNOSIS — E55.9 VITAMIN D DEFICIENCY: ICD-10-CM

## 2023-08-03 DIAGNOSIS — K21.9 GASTROESOPHAGEAL REFLUX DISEASE WITHOUT ESOPHAGITIS: ICD-10-CM

## 2023-08-03 DIAGNOSIS — K20.90 BARRETT'S ESOPHAGUS WITH ESOPHAGITIS: ICD-10-CM

## 2023-08-03 DIAGNOSIS — J45.909: ICD-10-CM

## 2023-08-03 DIAGNOSIS — E11.9 TYPE 2 DIABETES MELLITUS WITHOUT COMPLICATION, WITHOUT LONG-TERM CURRENT USE OF INSULIN (HCC): ICD-10-CM

## 2023-08-03 DIAGNOSIS — E66.01 CLASS 2 SEVERE OBESITY DUE TO EXCESS CALORIES WITH SERIOUS COMORBIDITY AND BODY MASS INDEX (BMI) OF 37.0 TO 37.9 IN ADULT (HCC): Primary | ICD-10-CM

## 2023-08-03 DIAGNOSIS — K22.70 BARRETT'S ESOPHAGUS WITH ESOPHAGITIS: ICD-10-CM

## 2023-08-03 DIAGNOSIS — K76.0 FATTY INFILTRATION OF LIVER: ICD-10-CM

## 2023-08-03 DIAGNOSIS — G43.109 MIGRAINE WITH AURA: ICD-10-CM

## 2023-08-03 PROBLEM — E66.812 CLASS 2 SEVERE OBESITY DUE TO EXCESS CALORIES WITH SERIOUS COMORBIDITY AND BODY MASS INDEX (BMI) OF 37.0 TO 37.9 IN ADULT (HCC): Status: ACTIVE | Noted: 2017-12-12

## 2023-08-03 PROCEDURE — 99244 OFF/OP CNSLTJ NEW/EST MOD 40: CPT | Performed by: INTERNAL MEDICINE

## 2023-08-03 RX ORDER — METHYLPREDNISOLONE 4 MG/1
TABLET ORAL
COMMUNITY
Start: 2023-06-29

## 2023-08-03 NOTE — ASSESSMENT & PLAN NOTE
Topiramate may be considered as a prophylactic medication as it has a weight negative effect. No contraindications to starting this medication in the future.

## 2023-08-03 NOTE — ASSESSMENT & PLAN NOTE
- Discussed options of HealthyCORE-Intensive Lifestyle Intervention Program, Very Low Calorie Diet-VLCD, Conservative Program, Clara-En-Y Gastric Bypass, and Vertical Sleeve Gastrectomy and the role of weight loss medications. - Explained the importance of making lifestyle changes first before starting anti-obesity medications. - Patient should demonstrate lifestyle changes first before anti-obesity medication initiated. - Patient is interested in pursuing Clara-En-Y Gastric Bypass and Vertical Sleeve Gastrectomy  - Initial weight loss goal of 5-10% weight loss for improved health as studies have shown this is where we see the greatest impact on improving health and decreasing risk of obesity related conditions. - Weight loss can improve patient's co-morbid conditions and/or prevent weight-related complications. - Stop León Bolanos 6/8  - Labs reviewed: As below. General Recommendations:  Nutrition:  Eat breakfast daily. Do not skip meals. Food log (ie.) www.Varthana.com, sparkpeople. com, loseit.com, calorieking. com, etc.    Practice mindful eating. Be sure to set aside time to eat, eat slowly, and savor your food. Hydration: At least 64oz of water daily. No sugar sweetened beverages. No juice (eat the fruit instead). Exercise:  Studies have shown that the ideal exercise goal is somewhere between 150 to 300 minutes of moderate intensity exercise a week. Start with exercising 10 minutes every other day and gradually increase physical activity with a goal of at least 150 minutes of moderate intensity exercise a week, divided over at least 3 days a week. An example of this would be exercising 30 minutes a day, 5 days a week. Resistance training can increase muscle mass and increase our resting metabolic rate. FULL BODY resistance training is recommended 2-3 times a week. Do not do this on consecutive days to allow for muscle recovery.     Aim for a bare minimum 5000 steps, even on days you do not exercise. Monitoring:   Weigh yourself daily. If this causes undue stress, then just weigh yourself once a week. Weigh yourself the same time of the day with the same amount of clothing on. Preferably this should be done after waking up, before you eat, and with no clothing or minimal clothing on. Specific Goals:  Food log (ie.) www.Hyper Wear.com,9tong.com,[employer] Services[employer].com,CipherOpt[employer]. com,etc.     Calorie goal:  8254-4744 calories (Provided with meal plan to follow). Meet with bariatric surgeon to discuss option. Consider body STAT package if you do not move forward with bariatric surgery.     Return visit:  as needed

## 2023-08-03 NOTE — PATIENT INSTRUCTIONS
General Recommendations:  Nutrition:  Eat breakfast daily. Do not skip meals. Food log (ie.) www.Peela.com, sparkpeople. com, loseit.com, calorieking. com, etc.    Practice mindful eating. Be sure to set aside time to eat, eat slowly, and savor your food. Hydration: At least 64oz of water daily. No sugar sweetened beverages. No juice (eat the fruit instead). Exercise:  Studies have shown that the ideal exercise goal is somewhere between 150 to 300 minutes of moderate intensity exercise a week. Start with exercising 10 minutes every other day and gradually increase physical activity with a goal of at least 150 minutes of moderate intensity exercise a week, divided over at least 3 days a week. An example of this would be exercising 30 minutes a day, 5 days a week. Resistance training can increase muscle mass and increase our resting metabolic rate. FULL BODY resistance training is recommended 2-3 times a week. Do not do this on consecutive days to allow for muscle recovery. Aim for a bare minimum 5000 steps, even on days you do not exercise. Monitoring:   Weigh yourself daily. If this causes undue stress, then just weigh yourself once a week. Weigh yourself the same time of the day with the same amount of clothing on. Preferably this should be done after waking up, before you eat, and with no clothing or minimal clothing on. Specific Goals:  Food log (ie.) www.Peela.com,[employer] Warrior,calorieking. com,etc.     Calorie goal:  4459-5637 calories (Provided with meal plan to follow). Meet with bariatric surgeon to discuss option. Consider body STAT package if you do not move forward with bariatric surgery.     Return visit:  as needed

## 2023-08-03 NOTE — PROGRESS NOTES
Assessment/Plan:  Mikey Lopez was seen today for consult. Diagnoses and all orders for this visit:    Class 2 severe obesity due to excess calories with serious comorbidity and body mass index (BMI) of 37.0 to 37.9 in [facility])  -     Ambulatory Referral to Weight Management  -     Ambulatory referral to Sleep Medicine; Future    Type 2 diabetes mellitus without complication, without long-term current use of insulin (HCC)    Fatty infiltration of liver    Gastroesophageal reflux disease without esophagitis    Banks's esophagus with esophagitis    Migraine with aura    Vitamin D deficiency    Extrinsic asthma without status asthmaticus       Migraine with aura  Topiramate may be considered as a prophylactic medication as it has a weight negative effect. No contraindications to starting this medication in the future. Type 2 diabetes mellitus without complication, without long-term current use of insulin (HCC)  Discussed the various GLP-1 receptor agonist medications and that she may wish to start 1 of these as they can also have a weight negative effect. I discussed the expected/average weight loss with each. She does not wish to start on a GLP-1 receptor agonist or GLP-1 receptor agonist/GIP medication at this time as she would like to first meet with a bariatric surgeon. Her goal is for significant weight loss without long-term medication. Lab Results   Component Value Date    HGBA1C 6.7 (H) 04/07/2023       Class 2 severe obesity due to excess calories with serious comorbidity and body mass index (BMI) of 37.0 to 37.9 in [facility])  - Discussed options of HealthyCORE-Intensive Lifestyle Intervention Program, Very Low Calorie Diet-VLCD, Conservative Program, Clara-En-Y Gastric Bypass, and Vertical Sleeve Gastrectomy and the role of weight loss medications. - Explained the importance of making lifestyle changes first before starting anti-obesity medications.   - Patient should demonstrate lifestyle changes first before anti-obesity medication initiated. - Patient is interested in pursuing Clara-En-Y Gastric Bypass and Vertical Sleeve Gastrectomy  - Initial weight loss goal of 5-10% weight loss for improved health as studies have shown this is where we see the greatest impact on improving health and decreasing risk of obesity related conditions. - Weight loss can improve patient's co-morbid conditions and/or prevent weight-related complications. - Stop Angel Gómez 6/8  - Labs reviewed: As below. General Recommendations:  Nutrition:  Eat breakfast daily. Do not skip meals. Food log (ie.) www.myfitnesspal.com, sparkpeople. com, loseit.com, [employer]. com, etc.    Practice mindful eating. Be sure to set aside time to eat, eat slowly, and savor your food. Hydration: At least 64oz of water daily. No sugar sweetened beverages. No juice (eat the fruit instead). Exercise:  Studies have shown that the ideal exercise goal is somewhere between 150 to 300 minutes of moderate intensity exercise a week. Start with exercising 10 minutes every other day and gradually increase physical activity with a goal of at least 150 minutes of moderate intensity exercise a week, divided over at least 3 days a week. An example of this would be exercising 30 minutes a day, 5 days a week. Resistance training can increase muscle mass and increase our resting metabolic rate. FULL BODY resistance training is recommended 2-3 times a week. Do not do this on consecutive days to allow for muscle recovery. Aim for a bare minimum 5000 steps, even on days you do not exercise. Monitoring:   Weigh yourself daily. If this causes undue stress, then just weigh yourself once a week. Weigh yourself the same time of the day with the same amount of clothing on. Preferably this should be done after waking up, before you eat, and with no clothing or minimal clothing on.     Specific Goals:  Food log (ie.) www.[employer] Medical[employer]pal.com,[place]. com,loseit.com,CargoSpotter. com,etc.     Calorie goal:  2434-0520 calories (Provided with meal plan to follow). Meet with bariatric surgeon to discuss option. Consider body STAT package if you do not move forward with bariatric surgery. Return visit:  as needed       Total time spent reviewing chart, interviewing patient, examining patient, discussing plan, answering all questions, and documentin min.       ______________________________________________________________________    Subjective:   Chief Complaint   Patient presents with   • Consult     MWM consult; waist 49in; goal wt 125; stop bang 6-8     HPI: Boyd Valdivia  is a 61 y.o. female with history of sever asthma requiring chronic steroids to manage, DM2, Migraines (2x/m), GERD, Fatty liver diseaseand excess weight, here to pursue weight loss management. Previous notes and records have been reviewed. HPI  Wt Readings from Last 20 Encounters:   23 95.2 kg (209 lb 12.8 oz)   23 94.8 kg (209 lb)   04/10/23 94.8 kg (209 lb)   22 89.8 kg (198 lb)   22 89.8 kg (198 lb)   22 95.3 kg (210 lb)   22 95.7 kg (211 lb)   22 94.3 kg (208 lb)   22 94.3 kg (208 lb)   20 92.1 kg (203 lb)   19 88.2 kg (194 lb 6.4 oz)   19 88 kg (194 lb)   09/10/19 86.6 kg (191 lb)   19 89.8 kg (198 lb)   19 88.5 kg (195 lb)   19 87.1 kg (192 lb)   17 91.7 kg (202 lb 1.6 oz)     Excess Weight:  Highest weight: 211  Current weight: 209  Goal 125 (84lbs 40% of current body weight)  What has been tried: Diet and Exercise   Keto diet - not able to sustain    Contributing factors: Poor Food Choices, Insufficient Physical Activity and Medications  Associated symptoms and effects: comorbid conditions fatigue    Food logging: No    Hunger/Cravings: Yes. Drinks water when she gets cravings.   Dining out:  Twice a week  Hydration:  Water 3-4 bottle,  Black coffee (no sugar)  Alcohol: Rare  Smoking:  No  Exercise:  No  Weight Monitoring:  Never  Sleep: 5  STOP-BANG Score: 6/8 - sleep  Occupation:  Sedentary. [employer]    Past Medical History:   Diagnosis Date   • Asthma    • Banks's esophagus    • GERD (gastroesophageal reflux disease)    • Hyperlipidemia    • Migraine    • Nasal polyposis      Patient denies personal and family history of  pancreatitis, thyroid cancer, MEN-2 tumors. Denies any hx of glaucoma, seizures, kidney stones, gallstones. Denies Hx of CAD, PAD, palpitations, arrhythmia. Denies uncontrolled anxiety or depression, suicidal behavior or thinking , insomnia or sleep disturbance. Past Surgical History:   Procedure Laterality Date   • FUNCTIONAL ENDOSCOPIC SINUS SURGERY  2006    [facility]   • [place] STRTCTC CPTR ASSTD [address] [facility] EXTRADURAL CRANIAL Bilateral 9/3/2019    Procedure: IMAGE GUIDED FUNCTIONAL ENDOSCOPIC SINUS SURGERY; Bilateral frontal sinusotomies;  Surgeon: Anisha Chen DO;  Location: [facility];  Service: ENT     The following portions of the patient's history were reviewed and updated as appropriate: allergies, current medications, past family history, past medical history, past social history, past surgical history, and problem list.    Review Of Systems:  Review of Systems   Constitutional: Negative for activity change, appetite change, fatigue and fever. Respiratory: Negative for cough and shortness of breath. Cardiovascular: Negative for chest pain, palpitations and leg swelling. Gastrointestinal: Negative for abdominal pain, constipation, diarrhea, nausea and vomiting. Endocrine: Negative for cold intolerance and heat intolerance. Genitourinary: Negative for difficulty urinating and dysuria. Musculoskeletal: Negative for arthralgias, back pain and gait problem. Skin: Negative for pallor and rash. Neurological: Negative for headaches.    Psychiatric/Behavioral: Negative for dysphoric mood, sleep disturbance and suicidal ideas (or HI). The patient is not nervous/anxious. Objective:  /76   Pulse 98   Resp 16   Ht 5' 3" (1.6 m)   Wt 95.2 kg (209 lb 12.8 oz)   BMI 37.16 kg/m²   Physical Exam  Vitals and nursing note reviewed. Constitutional:       General: She is not in acute distress. Appearance: Normal appearance. She is not ill-appearing or diaphoretic. Eyes:      General: No scleral icterus (ozemp). Cardiovascular:      Rate and Rhythm: Normal rate and regular rhythm. Pulses: Normal pulses. Heart sounds: No murmur heard. Pulmonary:      Effort: Pulmonary effort is normal. No respiratory distress. Breath sounds: Normal breath sounds. No wheezing or rhonchi. Abdominal:      General: Bowel sounds are normal. There is no distension. Palpations: Abdomen is soft. There is no mass. Tenderness: There is no abdominal tenderness. Musculoskeletal:      Cervical back: Neck supple. Right lower leg: No edema. Left lower leg: No edema. Lymphadenopathy:      Cervical: No cervical adenopathy. Skin:     Capillary Refill: Capillary refill takes less than 2 seconds. Findings: No lesion or rash. Neurological:      Mental Status: She is alert and oriented to person, place, and time. Gait: Gait normal.   Psychiatric:         Mood and Affect: Mood normal.         Behavior: Behavior normal.       Labs and Imaging  Recent labs and imaging have been personally reviewed.   Lab Results   Component Value Date    WBC 6.4 07/21/2023    HGB 12.4 07/21/2023    HCT 37.1 07/21/2023    MCV 90.9 07/21/2023     07/21/2023     Lab Results   Component Value Date    SODIUM 134 (L) 07/21/2023    K 4.4 07/21/2023     07/21/2023    CO2 28 07/21/2023    AGAP 0 (L) 04/07/2023    BUN 12 07/21/2023    CREATININE 0.50 07/21/2023    GLUC 153 (H) 07/21/2023    GLUF 98 04/07/2023    CALCIUM 9.0 07/21/2023    AST 18 07/21/2023    ALT 27 07/21/2023 ALKPHOS 71 07/21/2023    TP 9.5 (H) 07/21/2023    TBILI 0.3 07/21/2023    EGFR 109 07/21/2023     Lab Results   Component Value Date    HGBA1C 6.7 (H) 04/07/2023     Lab Results   Component Value Date    SAN8CFNHSKSN 2.110 01/31/2022     Lab Results   Component Value Date    CHOLESTEROL 206 (H) 04/07/2023     Lab Results   Component Value Date    HDL 40 (L) 04/07/2023     Lab Results   Component Value Date    TRIG 243 (H) 04/07/2023     Lab Results   Component Value Date    LDLCALC 117 (H) 04/07/2023       Contains abnormal data Hemoglobin A1C  Order: [id number]   Status: Final result      Visible to patient: Yes (seen)      Next appt: 08/11/2023 at 10:50 AM in [address] [place] [address] Colin Brandon PA-C      Dx: Type 2 diabetes mellitus without comp. ..      0 Result Notes     1  Topic             Component Ref Range & Units 4/7/23  4:10 PM 8/9/22 12:43 PM 1/31/22 11:10 AM 7/23/21 12:56 PM 4/23/20 12:34 PM 9/27/19  5:56 PM 6/7/19 11:45 AM   Hemoglobin A1C Normal 3.8-5.6%; PreDiabetic 5.7-6.4%;  Diabetic >=6.5%; Glycemic control for adults with diabetes <7.0% % 6.7 High   6.5 High   6.8 High   6.9 High  R, CM  6.7 High  R, CM

## 2023-08-03 NOTE — ASSESSMENT & PLAN NOTE
Discussed the various GLP-1 receptor agonist medications and that she may wish to start 1 of these as they can also have a weight negative effect. I discussed the expected/average weight loss with each. She does not wish to start on a GLP-1 receptor agonist or GLP-1 receptor agonist/GIP medication at this time as she would like to first meet with a bariatric surgeon. Her goal is for significant weight loss without long-term medication.   Lab Results   Component Value Date    HGBA1C 6.7 (H) 04/07/2023

## 2023-08-07 ENCOUNTER — TELEPHONE (OUTPATIENT)
Dept: OTHER | Facility: OTHER | Age: 58
End: 2023-08-07

## 2023-08-07 ENCOUNTER — TELEPHONE (OUTPATIENT)
Dept: BARIATRICS | Facility: CLINIC | Age: 58
End: 2023-08-07

## 2023-08-07 DIAGNOSIS — E11.9 TYPE 2 DIABETES MELLITUS WITHOUT COMPLICATION, WITHOUT LONG-TERM CURRENT USE OF INSULIN (HCC): Primary | ICD-10-CM

## 2023-08-07 NOTE — TELEPHONE ENCOUNTER
Patient called stating that her insurance covers [facility]. She would like a script sent to her pharmacy for this med.

## 2023-08-07 NOTE — TELEPHONE ENCOUNTER
Patient has changed her mind for right now and wants to start [facility]. She states that she has did her investigating over the weekend and wants to go this route.

## 2023-08-08 NOTE — TELEPHONE ENCOUNTER
The patient is okay with starting the [facility]. She is asking what do you think about the medication over the surgery. She wants to know do you think the med will help her lose the weight to accomplish her goal faster than the surgery or do you think having the surgery is the best option for her. Please advise.

## 2023-08-13 DIAGNOSIS — D47.2 IGG GAMMOPATHY: Primary | ICD-10-CM

## 2023-08-14 ENCOUNTER — TELEPHONE (OUTPATIENT)
Dept: HEMATOLOGY ONCOLOGY | Facility: CLINIC | Age: 58
End: 2023-08-14

## 2023-08-14 NOTE — TELEPHONE ENCOUNTER
I called [facility] in response to a referral that was received for patient to establish care with Hematology. Outreach was made to schedule a consultation. I left a voicemail explaining the reason for my call and advised patient to call [facility] at [phone]. Another attempt will be made to contact patient.

## 2023-08-15 ENCOUNTER — OFFICE VISIT (OUTPATIENT)
Dept: BARIATRICS | Facility: CLINIC | Age: 58
End: 2023-08-15
Payer: COMMERCIAL

## 2023-08-15 VITALS
HEIGHT: 63 IN | SYSTOLIC BLOOD PRESSURE: 120 MMHG | WEIGHT: 210.6 LBS | BODY MASS INDEX: 37.32 KG/M2 | DIASTOLIC BLOOD PRESSURE: 80 MMHG | HEART RATE: 87 BPM | RESPIRATION RATE: 16 BRPM

## 2023-08-15 DIAGNOSIS — E66.9 OBESITY, CLASS II, BMI 35-39.9: Primary | ICD-10-CM

## 2023-08-15 DIAGNOSIS — K76.0 FATTY INFILTRATION OF LIVER: ICD-10-CM

## 2023-08-15 DIAGNOSIS — E11.9 TYPE 2 DIABETES MELLITUS WITHOUT COMPLICATION, WITHOUT LONG-TERM CURRENT USE OF INSULIN (HCC): ICD-10-CM

## 2023-08-15 DIAGNOSIS — J45.909: ICD-10-CM

## 2023-08-15 PROCEDURE — 99214 OFFICE O/P EST MOD 30 MIN: CPT | Performed by: INTERNAL MEDICINE

## 2023-08-15 RX ORDER — METHYLPREDNISOLONE 4 MG/1
2 TABLET ORAL
Start: 2023-08-15

## 2023-08-15 NOTE — PATIENT INSTRUCTIONS
START Mounjaro 2.5mg weekly. Side effects of Mounjaro include nausea, vomiting, diarrhea, or constipation. Keep an eye on your heart rate while on Mounjaro. If you resting heart rate is greater than 100 beats per minutes, please notify me. If you develop severe abdominal pain, stop Mounjaro and go to the emergency room, as that could be a sign of pancreatitis. Nurse visit in one month. If you are doing well at that point we will increase to mounjaro 5mg weekly. Repeat HgA1C prior to follow-up in 3-4 months. Continue with regular walks.

## 2023-08-15 NOTE — PROGRESS NOTES
Assessment/Plan:  Meghana Olivo was seen today for follow-up. Diagnoses and all orders for this visit:    Obesity, Class II, BMI 35-39.9    Type 2 diabetes mellitus without complication, without long-term current use of insulin ([facility])  -     HEMOGLOBIN A1C W/ EAG ESTIMATION; Future  -     tirzepatide (Mounjaro) 2.5 MG/0.5ML; Inject 0.5 mL (2.5 mg total) under the skin every 7 days    Fatty infiltration of liver    Extrinsic asthma without status asthmaticus  -     methylprednisolone (MEDROL) 4 mg tablet; Take 0.5 tablets (2 mg total) by mouth daily with breakfast       Lengthy discussion on GLP-1/GIP receptor agonist, the mechanism of action, the common side effects, and the rare but severe side effects, titration schedule, how to administer, etc.  All patient questions were answered. Decided that she does wish to proceed with Mounjaro on label to help her control her type 2 diabetes, and off label to help with weight loss. Patient advised to start Mounjaro 2.5 mg weekly. She will come in for nurse visit in 1 month. If she is doing well at this point we will increase to [facility] 5 mg weekly. She will then repeat a hemoglobin A1c prior to her follow-up appointment with me in 3 months. Patient understood understanding and agreement with the plan.    ______________________________________________________________________        Subjective:   Chief Complaint   Patient presents with   • Follow-up     MWM 2 weeks f/u,  waist- 48.5in     Patient here to discuss weight associated problems and nutrition goals  HPI: Lilo Ku  is a 61 y.o. female with history of steroid use to manage asthma, type 2 diabetes, migraine headaches, GERD, fatty liver disease, and excess weight. Weight loss plan:  Conservative Program.   Most recent notes and records were reviewed.     Wt Readings from Last 10 Encounters:   08/15/23 95.5 kg (210 lb 9.6 oz)   08/03/23 95.2 kg (209 lb 12.8 oz)   06/20/23 94.8 kg (209 lb)   04/10/23 94.8 kg (209 lb)   08/02/22 89.8 kg (198 lb)   07/08/22 89.8 kg (198 lb)   03/01/22 95.3 kg (210 lb)   02/04/22 95.7 kg (211 lb)   02/02/22 94.3 kg (208 lb)   01/28/22 94.3 kg (208 lb)     Patient establish care with me on 8/3/2023. At that time she wished to meet with a bariatric surgeon to discuss weight loss options without starting the medication. After going home she did more research on GLP-1 receptor agonist and presents today to further discuss as she believes that she may wish to try 1 of these medications first prior to proceeding with surgery. She denies any family history or personal history of pancreatitis, thyroid cancer, oo MEN-2 tumors. Denies any hx of glaucoma, seizures, kidney stones, gallstones. Denies Hx of CAD, PAD, palpitations, arrhythmia. Denies uncontrolled anxiety or depression, suicidal ideation or behavior, insomnia or sleep disturbance. Review Of Systems:  Review of Systems   Constitutional: Negative for activity change, appetite change, fatigue and fever. Respiratory: Negative for cough and shortness of breath. Cardiovascular: Negative for chest pain, palpitations and leg swelling. Gastrointestinal: Negative for abdominal pain, constipation, diarrhea, nausea and vomiting. Endocrine: Negative for cold intolerance and heat intolerance. Genitourinary: Negative for difficulty urinating and dysuria. Musculoskeletal: Negative for arthralgias, back pain and gait problem. Skin: Negative for pallor and rash. Neurological: Negative for headaches. Psychiatric/Behavioral: Negative for dysphoric mood, sleep disturbance and suicidal ideas (or HI). The patient is not nervous/anxious. Objective:  /80   Pulse 87   Resp 16   Ht 5' 3" (1.6 m)   Wt 95.5 kg (210 lb 9.6 oz)   BMI 37.31 kg/m²   Physical Exam  Vitals and nursing note reviewed. Constitutional:       General: She is not in acute distress. Appearance: Normal appearance.  She is not ill-appearing or diaphoretic. Eyes:      General: No scleral icterus. Cardiovascular:      Rate and Rhythm: Normal rate and regular rhythm. Pulses: Normal pulses. Heart sounds: No murmur heard. Pulmonary:      Effort: Pulmonary effort is normal. No respiratory distress. Breath sounds: Normal breath sounds. No wheezing or rhonchi. Abdominal:      General: Bowel sounds are normal. There is no distension. Palpations: Abdomen is soft. There is no mass. Tenderness: There is no abdominal tenderness. Musculoskeletal:      Cervical back: Neck supple. Right lower leg: No edema. Left lower leg: No edema. Lymphadenopathy:      Cervical: No cervical adenopathy. Skin:     Capillary Refill: Capillary refill takes less than 2 seconds. Findings: No lesion or rash. Neurological:      Mental Status: She is alert and oriented to person, place, and time. Gait: Gait normal.   Psychiatric:         Mood and Affect: Mood normal.         Behavior: Behavior normal.       Labs and Imaging  Recent labs and imaging have been personally reviewed.   Lab Results   Component Value Date    WBC 6.4 07/21/2023    HGB 12.4 07/21/2023    HCT 37.1 07/21/2023    MCV 90.9 07/21/2023     07/21/2023     Lab Results   Component Value Date    SODIUM 134 (L) 07/21/2023    K 4.4 07/21/2023     07/21/2023    CO2 28 07/21/2023    AGAP 0 (L) 04/07/2023    BUN 12 07/21/2023    CREATININE 0.50 07/21/2023    GLUC 153 (H) 07/21/2023    GLUF 98 04/07/2023    CALCIUM 9.0 07/21/2023    AST 18 07/21/2023    ALT 27 07/21/2023    ALKPHOS 71 07/21/2023    TP 9.5 (H) 07/21/2023    TBILI 0.3 07/21/2023    EGFR 109 07/21/2023     Lab Results   Component Value Date    HGBA1C 6.7 (H) 04/07/2023     Lab Results   Component Value Date    EPL4CCBINAGY 2.110 01/31/2022     Lab Results   Component Value Date    CHOLESTEROL 206 (H) 04/07/2023     Lab Results   Component Value Date    HDL 40 (L) 04/07/2023     Lab Results   Component Value Date    TRIG 243 (H) 04/07/2023     Lab Results   Component Value Date    LDLCALC 117 (H) 04/07/2023

## 2023-08-16 ENCOUNTER — TELEPHONE (OUTPATIENT)
Dept: ADMINISTRATIVE | Facility: OTHER | Age: 58
End: 2023-08-16

## 2023-08-16 ENCOUNTER — OFFICE VISIT (OUTPATIENT)
Dept: FAMILY MEDICINE CLINIC | Facility: CLINIC | Age: 58
End: 2023-08-16
Payer: COMMERCIAL

## 2023-08-16 VITALS
SYSTOLIC BLOOD PRESSURE: 122 MMHG | DIASTOLIC BLOOD PRESSURE: 82 MMHG | TEMPERATURE: 96.9 F | WEIGHT: 211 LBS | HEIGHT: 63 IN | HEART RATE: 84 BPM | BODY MASS INDEX: 37.39 KG/M2

## 2023-08-16 DIAGNOSIS — E66.01 CLASS 2 SEVERE OBESITY DUE TO EXCESS CALORIES WITH SERIOUS COMORBIDITY AND BODY MASS INDEX (BMI) OF 37.0 TO 37.9 IN ADULT (HCC): ICD-10-CM

## 2023-08-16 DIAGNOSIS — E11.9 TYPE 2 DIABETES MELLITUS WITHOUT COMPLICATION, WITHOUT LONG-TERM CURRENT USE OF INSULIN (HCC): Primary | ICD-10-CM

## 2023-08-16 DIAGNOSIS — D47.2 IGG GAMMOPATHY: ICD-10-CM

## 2023-08-16 DIAGNOSIS — R80.9 MICROALBUMINURIA: ICD-10-CM

## 2023-08-16 LAB — SL AMB POCT HEMOGLOBIN AIC: 6.6 (ref ?–6.5)

## 2023-08-16 PROCEDURE — 99214 OFFICE O/P EST MOD 30 MIN: CPT | Performed by: PHYSICIAN ASSISTANT

## 2023-08-16 PROCEDURE — 83036 HEMOGLOBIN GLYCOSYLATED A1C: CPT | Performed by: PHYSICIAN ASSISTANT

## 2023-08-16 RX ORDER — BLOOD SUGAR DIAGNOSTIC
STRIP MISCELLANEOUS
Qty: 100 EACH | Refills: 3 | Status: SHIPPED | OUTPATIENT
Start: 2023-08-16

## 2023-08-16 RX ORDER — LANCETS 33 GAUGE
EACH MISCELLANEOUS
Qty: 100 EACH | Refills: 3 | Status: SHIPPED | OUTPATIENT
Start: 2023-08-16

## 2023-08-16 RX ORDER — BLOOD-GLUCOSE METER
KIT MISCELLANEOUS
Qty: 1 KIT | Refills: 0 | Status: SHIPPED | OUTPATIENT
Start: 2023-08-16

## 2023-08-16 NOTE — LETTER
Diabetic Eye Exam Form    Date Requested: 23  Patient: Dulce Gallagher  Patient : 1965   Referring Provider: Christ Michele PA-C      DIABETIC Eye Exam Date _______________________________      Type of Exam MUST be documented for Diabetic Eye Exams. Please CHECK ONE. Retinal Exam       Dilated Retinal Exam       OCT       Optomap-Iris Exam      Fundus Photography       Left Eye - Please check Retinopathy or No Retinopathy        Exam did show retinopathy    Exam did not show retinopathy       Right Eye - Please check Retinopathy or No Retinopathy       Exam did show retinopathy    Exam did not show retinopathy       Comments __________________________________________________________    Practice Providing Exam ______________________________________________    Exam Performed By (print name) _______________________________________      Provider Signature ___________________________________________________      These reports are needed for  compliance. Please fax this completed form and a copy of the Diabetic Eye Exam report to our office located at [address] [place] [address] as soon as possible via Fax [phone] attention Sara: Phone [phone]  We thank you for your assistance in treating our mutual patient.

## 2023-08-16 NOTE — ASSESSMENT & PLAN NOTE
I do not appreciate a hematologic evaluation in patient's chart and I am not sure if she ever had 1 and therefore I am ordering consult with hematology. Her globulin remains elevated at 5.8 total protein elevated at 9.5.   I do see that hematology already reached out to her and she should schedule this appointment by calling the helpline as directed by the phone message:[facility] at [phone]

## 2023-08-16 NOTE — TELEPHONE ENCOUNTER
Upon review of the In Basket request and the patient's chart, initial outreach has been made via fax to facility. Please see Contacts section for details.      Thank you  Bekah Huber

## 2023-08-16 NOTE — PATIENT INSTRUCTIONS
Problem List Items Addressed This Visit          Endocrine    Type 2 diabetes mellitus without complication, without long-term current use of insulin ([address]) - Primary     Patient does have diabetes as her A1c's have been above 6.5 since before 2022. We did discuss this in the past but patient is still not mentally excepting this diagnosis. In the past we have ordered diabetic education glucometer and supplies. Today fasting glucose is 153 which is definitely diabetic over 126 and her A1c fingerstick in the office was 6.6. Pt is awaiting mounjaro approval from bariatrics. Lab Results   Component Value Date    HGBA1C 6.7 (H) 04/07/2023            Relevant Orders    POCT hemoglobin A1c (Completed)    Comprehensive metabolic panel    Hemoglobin A1C    Albumin / creatinine urine ratio       Other    Class 2 severe obesity due to excess calories with serious comorbidity and body mass index (BMI) of 37.0 to 37.9 in [facility])     Patient is seeing bariatrics and currently waiting for mounjaro. IgG gammopathy     I do not appreciate a hematologic evaluation in patient's chart and I am not sure if she ever had 1 and therefore I am ordering consult with hematology. Her globulin remains elevated at 5.8 total protein elevated at 9.5. I do see that hematology already reached out to her and she should schedule this appointment by calling the helpline as directed by the phone message:[facility] at [phone]         Microalbuminuria     Continue Zestril 2.5. Type 2 Diabetes Management for Adults   AMBULATORY CARE:   Type 2 diabetes  is a disease that affects how your body uses glucose (sugar). Either your body cannot make enough insulin, or it cannot use the insulin correctly. It is important to keep diabetes controlled to prevent damage to your heart, blood vessels, and other organs. Management will help you feel well and enjoy your daily activities.  Your diabetes care team providers can help you make a plan to fit diabetes care into your schedule. Your plan can change over time to fit your needs and your family's needs. Have someone call your local emergency number (911 in the [address]) if:   You cannot be woken. You have signs of diabetic ketoacidosis:     confusion, fatigue    vomiting    rapid heartbeat    fruity smelling breath    extreme thirst    dry mouth and skin    You have any of the following signs of a heart attack:      Squeezing, pressure, or pain in your chest    You may  also have any of the following:     Discomfort or pain in your back, neck, jaw, stomach, or arm    Shortness of breath    Nausea or vomiting    Lightheadedness or a sudden cold sweat    You have any of the following signs of a stroke:      Numbness or drooping on one side of your face     Weakness in an arm or leg    Confusion or difficulty speaking    Dizziness, a severe headache, or vision loss    Call your doctor or diabetes care team provider if:   You have a sore or wound that will not heal.    You have a change in the amount you urinate. Your blood sugar levels are higher than your target goals. You often have lower blood sugar levels than your target goals. Your skin is red, dry, warm, or swollen. You have trouble coping with diabetes, or you feel anxious or depressed. You have questions or concerns about your condition or care. What you need to know about high blood sugar levels:  High blood sugar levels may not cause any symptoms. You may feel more thirsty or urinate more often than usual. Over time, high blood sugar levels can damage your nerves, blood vessels, tissues, and organs.  The following can increase your blood sugar levels:  Large meals or large amounts of carbohydrates at one time    Less physical activity    Stress    Illness    A lower dose of diabetes medicine or insulin, or a late dose    What you need to know about low blood sugar levels:  Symptoms include feeling shaky, dizzy, irritable, or confused. You can prevent symptoms by keeping your blood sugar levels from going too low. Treat a low blood sugar level right away:      Drink 4 ounces of juice or have 1 tube of glucose gel. Check your blood sugar level again 10 to 15 minutes later. When the level goes back to normal, eat a meal or snack to prevent another decrease. Keep glucose gel, raisins, or hard candy with you at all times to treat a low blood sugar level. Your blood sugar level can get too low if you take diabetes medicine or insulin and do not eat enough food. If you use insulin, check your blood sugar level before you exercise. If your blood sugar level is below 100 mg/dL, eat 4 crackers or 2 ounces of raisins, or drink 4 ounces of juice. Check your level every 30 minutes if you exercise longer than 1 hour. You may need a snack during or after exercise. What you can do to manage your blood sugar levels:   Check your blood sugar levels as directed and as needed. Several items are available to use to check your levels. You may need to check by testing a drop of blood in a glucose monitor. You may instead be given a continuous glucose monitoring (CGM) device. The device is worn at all times. The CGM checks your blood sugar level every 5 minutes. It sends results to an electronic device such as a smart phone. A CGM can be used with or without an insulin pump. You and your diabetes care team providers will decide on the best method for you. The goal for blood sugar levels before meals  is between 80 and 130 mg/dL and 2 hours after eating  is lower than 180 mg/dL. Make healthy food choices. Work with a dietitian to develop a meal plan that works for you and your schedule. A dietitian can help you learn how to eat the right amount of carbohydrates during your meals and snacks. Carbohydrates can raise your blood sugar level if you eat too many at one time.  Examples of foods that contain carbohydrates are breads, cereals, rice, pasta, and sweets. Eat high-fiber foods as directed. Fiber helps improve blood sugar levels. Fiber also lowers your risk for heart disease and other problems diabetes can cause. Examples of high-fiber foods include vegetables, whole-grain bread, and beans such as bae beans. Your dietitian can tell you how much fiber to have each day. Get regular physical activity. Physical activity can help you get to your target blood sugar level goal and manage your weight. Get at least 150 minutes of moderate to vigorous aerobic physical activity each week. Do not miss more than 2 days in a row. Do not sit longer than 30 minutes at a time. Your healthcare provider can help you create an activity plan. The plan can include the best activities for you and can help you build your strength and endurance. Maintain a healthy weight. Ask your team what a healthy weight is for you. A healthy weight can help you control diabetes and prevent heart disease. Ask your team to help you create a weight loss plan, if needed. Weight loss can help make a difference in managing diabetes. Your team will help you set a weight-loss goal, such as 10 to 15 pounds, or 5% of your extra weight. Together you and your team can set manageable weight loss goals. Take your diabetes medicine or insulin as directed. You may need diabetes medicine, insulin, or both to help control your blood sugar levels. Your healthcare provider will teach you how and when to take your diabetes medicine or insulin. You will also be taught about side effects oral diabetes medicine can cause. Insulin may be injected or given through a pump or pen. You and your providers will decide on the best method for you: An insulin pump  is an implanted device that gives your insulin 24 hours a day. An insulin pump prevents the need for multiple insulin injections in a day.          An insulin pen  is a device prefilled with the right amount of insulin. You and your family members will be taught how to draw up and give insulin  if this is the best method for you. Your providers will also teach you how to dispose of needles and syringes. You will learn how much insulin you need  and when to give it. You will be taught when not to give insulin. You will also be taught what to do if your blood sugar level drops too low. This may happen if you take insulin and do not eat the right amount of carbohydrates. More ways to manage type 2 diabetes:   Wear medical alert identification. Wear medical alert jewelry or carry a card that says you have diabetes. Ask your provider where to get these items. Do not smoke. Nicotine and other chemicals in cigarettes and cigars can cause lung and blood vessel damage. It also makes it more difficult to manage your diabetes. Ask your provider for information if you currently smoke and need help to quit. Do not use e-cigarettes or smokeless tobacco in place of cigarettes or to help you quit. They still contain nicotine. Check your feet each day for cuts, scratches, calluses, or other wounds. Look for redness and swelling, and feel for warmth. Wear shoes that fit well. Check your shoes for rocks or other objects that can hurt your feet. Do not walk barefoot or wear shoes without socks. Wear cotton socks to help keep your feet dry. Ask about vaccines you may need. You have a higher risk for serious illness if you get the flu, pneumonia, COVID-19, or hepatitis. Ask your provider if you should get vaccines to prevent these or other diseases, and when to get the vaccines. Talk to your provider if you become stressed about diabetes care. Sometimes being able to fit diabetes care into your life can cause increased stress. The stress can cause you not to take care of yourself properly. Your care team providers can help by offering tips about self-care.  Your providers may suggest you talk to a mental health provider who can listen and offer help with self-care issues. Have your A1c checked as directed. Your provider may check your A1c every 3 months, or 2 times each year if your diabetes is controlled. An A1c test shows the average amount of sugar in your blood over the past 2 to 3 months. Your provider will tell you what your A1c level should be. Have screening tests as directed. Your provider may recommend screening for complications of diabetes and other conditions that may develop. Some screenings may begin right away and some may happen within the first 5 years of diagnosis:    Examples of diabetes complications  include kidney problems, high cholesterol, high blood pressure, blood vessel problems, eye problems, and sleep apnea. You may be screened for a low vitamin B level  if you take oral diabetes medicine for a long time. Women of childbearing years may be screened  for polycystic ovarian syndrome (PCOS). Follow up with your doctor or diabetes care team providers as directed: You may need to have blood tests done before your follow-up visit. The test results will show if changes need to be made in your treatment or self-care. Talk to your provider if you cannot afford your medicine. Write down your questions so you remember to ask them during your visits. © Copyright [employer] 2022 Information is for End User's use only and may not be sold, redistributed or otherwise used for commercial purposes. The above information is an  only. It is not intended as medical advice for individual conditions or treatments. Talk to your doctor, nurse or pharmacist before following any medical regimen to see if it is safe and effective for you. Basic Carbohydrate Counting   AMBULATORY CARE:   Carbohydrate counting  is a way to plan your meals by counting the amount of carbohydrate in foods.  Carbohydrates are the sugars, starches, and fiber found in fruit, grains, vegetables, and milk products. Carbohydrates increase your blood sugar levels. Carbohydrate counting can help you eat the right amount of carbohydrate to keep your blood sugar levels under control. What you need to know about planning meals using carbohydrate counting:  A dietitian or healthcare provider will help you develop a healthy meal plan that works best for you. You will be taught how much carbohydrate to eat or drink for each meal and snack. Your meal plan will be based on your age, weight, usual food intake, and physical activity level. If you have diabetes, it will also include your blood sugar levels and diabetes medicine. Once you know how much carbohydrate you should eat, you can decide what type of food you want to eat. You will need to know what foods contain carbohydrate and how much they contain. Keep track of the amount of carbohydrate in meals and snacks in order to follow your meal plan. Do not avoid carbohydrates or skip meals. Your blood sugar may fall too low if you do not eat enough carbohydrate or you skip meals. Foods that contain carbohydrate:   Breads:  Each serving of food listed below contains about 15 g of carbohydrate . 1 slice of bread (1 ounce) or 1 flour or corn tortilla (6 inch)    ½ of a hamburger bun or ¼ of a large bagel (about 1 ounce)    1 pancake (about 4 inches across and ¼ inch thick)    Cereals and grains:  Serving sizes of ready-to-eat cereals vary. Look at the serving size and the total carbohydrate amount listed on the food label. Each serving of food listed below contains about 15 g of carbohydrate . ¾ cup of dry, unsweetened, ready-to-eat cereal or ¼ cup of low-fat granola     ½ cup of oatmeal or other cooked cereal     ? cup of cooked rice or pasta    Starchy vegetables and beans:  Each serving of food listed below contains about 15 g of carbohydrate .     ½ cup of corn, green peas, sweet potatoes, or mashed potatoes    ¼ of a large baked potato    ½ cup of beans, lentils, and peas (garbanzo, bae, kidney, white, split, black-eyed)    Crackers and snacks:  Each serving of food listed below contains about 15 g of carbohydrate . 3 burce cracker squares or 8 animal crackers     6 saltine-type crackers    3 cups of popcorn or ¾ ounce of pretzels, potato chips, or tortilla chips    Fruit:  Each serving of food listed below contains about 15 g of carbohydrate . 1 small (4 ounce) piece of fresh fruit or ¾ to 1 cup of fresh fruit    ½ cup of canned or frozen fruit, packed in natural juice    ½ cup (4 ounces) of unsweetened fruit juice    2 tablespoons of dried fruit    Desserts or sugary foods:  Each serving of food listed below contains about 15 g of carbohydrate . 2-inch square unfrosted cake or brownie     2 small cookies    ½ cup of ice cream, frozen yogurt, or nondairy frozen yogurt    ¼ cup of sherbet or sorbet    1 tablespoon of regular syrup, jam, or jelly    2 tablespoons of light syrup    Milk and yogurt:  Foods from the milk group contain about 12 g of carbohydrate per serving. 1 cup of fat-free or low-fat milk    1 cup of soy milk    ? cup of fat-free, yogurt sweetened with artificial sweetener    Non-starchy vegetables:  Each serving contains about 5 g of carbohydrate . Three servings of non-starch vegetables count as 1 carbohydrate serving. ½ cup of cooked vegetables or 1 cup of raw vegetables. This includes beets, broccoli, cabbage, cauliflower, cucumber, mushrooms, tomatoes, and zucchini    ½ cup of vegetable juice    How to use carbohydrate counting to plan meals:   Count carbohydrate amounts using serving sizes:      Pasta dinner example: You plan to have pasta, tossed salad, and an 8-ounce glass of milk. Your healthcare provider tells you that you may have 4 carbohydrate servings for dinner. One carbohydrate serving of pasta is ? cup. One cup of pasta will equal 3 carbohydrate servings.  An 8-ounce glass of milk will count as 1 carbohydrate serving. These amounts of food would equal 4 carbohydrate servings. One cup of tossed salad does not count toward your carbohydrate servings. Count carbohydrate amounts using food labels:  Find the total amount of carbohydrate in a packaged food by reading the food label. Food labels tell you the serving size of the food and the total carbohydrate amount in each serving. Find the serving size on the food label and then decide how many servings you will eat. Multiply the number of servings you plan to eat by the carbohydrate amount per serving. Granola bar snack example: Your meal plan allows you to have 2 carbohydrate servings (30 grams) of carbohydrate for a snack. You plan to eat 1 package of granola bars, which contains 2 bars. According to the food label, the serving size of food in this package is 1 bar. Each serving (1 bar) contains 25 grams of carbohydrate. The total amount of carbohydrate in this package of granola bars would be 50 g. Based on your meal plan, you should eat only 1 bar. Follow up with your doctor as directed:  Write down your questions so you remember to ask them during your visits. © Copyright [facility] 2022 Information is for End User's use only and may not be sold, redistributed or otherwise used for commercial purposes. The above information is an  only. It is not intended as medical advice for individual conditions or treatments. Talk to your doctor, nurse or pharmacist before following any medical regimen to see if it is safe and effective for you.

## 2023-08-16 NOTE — ASSESSMENT & PLAN NOTE
Patient does have diabetes as her A1c's have been above 6.5 since before 2022. We did discuss this in the past but patient is still not mentally excepting this diagnosis. In the past we have ordered diabetic education glucometer and supplies. Today fasting glucose is 153 which is definitely diabetic over 126 and her A1c fingerstick in the office was 6.6. Pt is awaiting mounjaro approval from bariatrics. Re ordered glucometer and supplies and DM Ed. Check in 6 months.    Lab Results   Component Value Date    HGBA1C 6.7 (H) 04/07/2023

## 2023-08-16 NOTE — PROGRESS NOTES
Name: Johnathan Fonseca      : 1965      MRN: [id number]  Encounter Provider: Leticia Joyner PA-C  Encounter Date: 2023   Encounter department: [facility] PRIMARY CARE    Assessment & Plan     1. Type 2 diabetes mellitus without complication, without long-term current use of insulin [facility])  Assessment & Plan:  Patient does have diabetes as her A1c's have been above 6.5 since before . We did discuss this in the past but patient is still not mentally excepting this diagnosis. In the past we have ordered diabetic education glucometer and supplies. Today fasting glucose is 153 which is definitely diabetic over 126 and her A1c fingerstick in the office was 6.6. Pt is awaiting mounjaro approval from bariatrics. Re ordered glucometer and supplies and DM Ed. Check in 6 months. Lab Results   Component Value Date    HGBA1C 6.7 (H) 2023       Orders:  -     POCT hemoglobin A1c  -     Comprehensive metabolic panel; Future; Expected date: 2024  -     Hemoglobin A1C; Future; Expected date: 2024  -     Albumin / creatinine urine ratio; Future; Expected date: 2024  -     Comprehensive metabolic panel  -     Ambulatory referral to Diabetic Education - use to refer for diabetes group classes, individual diabetes education, medical nutrition therapy, device training; Future; Expected date: 2023  -     glucose blood (OneTouch Verio) test strip; Check blood sugars once daily. Please substitute with appropriate alternative as covered by patient's insurance. Dx: E11.65  -     OneTouch Delica Lancets 98N MISC; Check blood sugars once daily. Please substitute with appropriate alternative as covered by patient's insurance. Dx: E11.65  -     Blood Glucose Monitoring Suppl (OneTouch Verio Reflect) w/Device KIT; Check blood sugars once daily. Please substitute with appropriate alternative as covered by patient's insurance. Dx: E11.65    2.  Microalbuminuria  Assessment & Plan:  Continue Zestril 2.5.      3. IgG gammopathy  Assessment & Plan:  I do not appreciate a hematologic evaluation in patient's chart and I am not sure if she ever had 1 and therefore I am ordering consult with hematology. Her globulin remains elevated at 5.8 total protein elevated at 9.5. I do see that hematology already reached out to her and she should schedule this appointment by calling the helpline as directed by the phone message:[facility] at [phone]      4. Class 2 severe obesity due to excess calories with serious comorbidity and body mass index (BMI) of 37.0 to 37.9 in adult [facility])  Assessment & Plan:  Patient is seeing bariatrics and currently waiting for mounjaro. Subjective        [address] is here for chronic conditions f/u.  Pt. had labs done prior to today's visit which included Recent Results (from the past 672 hour(s))  -CBC and differential:   Collection Time: 07/21/23  8:00 AM       Result                      Value             Ref Range           White Blood Cell Count      6.4               3.8 - 10.8 T*       Red Blood Cell Count        4.08              3.80 - 5.10 *       Hemoglobin                  12.4              11.7 - 15.5 *       HCT                         37.1              35.0 - 45.0 %       MCV                         90.9              80.0 - 100.0*       MCH                         30.4              27.0 - 33.0 *       MCHC                        33.4              32.0 - 36.0 *       RDW                         13.8              11.0 - 15.0 %       Platelet Count              194               140 - 400 Th*       SL AMB MPV                  12.3              7.5 - 12.5 fL       Neutrophils (Absolute)      2,656             1,500 - 7,80*       Lymphocytes (Absolute)      2,822             850 - 3,900 *       Monocytes (Absolute)        474               200 - 950 ce*       Eosinophils (Absolute)      358               15 - 500 divine*       Basophils ABS 90                0 - 200 cell*       Neutrophils                 41.5              %                   Lymphocytes                 44.1              %                   Monocytes                   7.4               %                   Eosinophils                 5.6               %                   Basophils PCT               1.4               %              -Comprehensive metabolic panel:   Collection Time: 07/21/23  8:00 AM       Result                      Value             Ref Range           Glucose, Random             153 (H)           65 - 99 mg/dL       BUN                         12                7 - 25 mg/dL        Creatinine                  0.50              0.50 - 1.03 *       eGFR                        109               > OR = 60 mL*       SL AMB BUN/CREATININE *                       6 - 22 (calc)   NOT APPLICABLE       Sodium                      134 (L)           135 - 146 mm*       Potassium                   4.4               3.5 - 5.3 mm*       Chloride                    103               98 - 110 mmo*       CO2                         28                20 - 32 mmol*       Calcium                     9.0               8.6 - 10.4 m*       Protein, Total              9.5 (H)           6.1 - 8.1 g/*       Albumin                     3.7               3.6 - 5.1 g/*       Globulin                    5.8 (H)           1.9 - 3.7 g/*       Albumin/Globulin Ratio      0.6 (L)           1.0 - 2.5 (c*       TOTAL BILIRUBIN             0.3               0.2 - 1.2 mg*       Alkaline Phosphatase        71                37 - 153 U/L        AST                         18                10 - 35 U/L         ALT                         27                6 - 29 U/L     -TSH, 3rd generation with Free T4 reflex:   Collection Time: 07/21/23  8:00 AM       Result                      Value             Ref Range           TSH W/RFX TO FREE T4        2.40              0.40 - 4.50 *  -Cortisol Level,7-9 AM Specimen:   Collection Time: 07/21/23  8:00 AM       Result                      Value             Ref Range           Cortisol AM                 12.4              mcg/dL         -ACTH:   Collection Time: 07/21/23  8:00 AM       Result                      Value             Ref Range           ACTH                        15                6 - 50 pg/mL       Review of Systems   Constitutional: Negative. HENT: Negative. Eyes: Negative. Respiratory: Negative. Cardiovascular: Negative. Gastrointestinal: Negative. Endocrine: Negative. Genitourinary: Negative. Musculoskeletal: Negative. Skin: Negative. Allergic/Immunologic: Negative. Neurological: Negative. Hematological: Negative. Psychiatric/Behavioral: Negative.         Current Outpatient Medications on File Prior to Visit   Medication Sig   • ADVAIR DISKUS 500-50 MCG/DOSE inhaler Inhale 1 puff 2 (two) times a day    • albuterol (2.5 mg/3 mL) 0.083 % nebulizer solution TK 3 ML BY NEBULIZATION BID PRN FOR WHEEZING   • albuterol (ACCUNEB) 1.25 MG/3ML nebulizer solution Inhale   • BREO ELLIPTA 200-25 MCG/INH inhaler Inhale 1 puff daily    • DEXILANT 60 MG capsule Take 60 mg by mouth daily    • dupilumab (DUPIXENT) subcutaneous injection Inject 300 mg under the skin once    • fexofenadine (ALLEGRA) 180 MG tablet Take 1 tablet (180 mg total) by mouth daily   • fluticasone (FLONASE) 50 mcg/act nasal spray 1 spray into each nostril daily   • ipratropium (ATROVENT) 0.02 % nebulizer solution USE 1 AMPULE VIA NEBULIZER 1-2 TIMES DAILY AS NEEDED   • lisinopril (ZESTRIL) 2.5 mg tablet TAKE 1 TABLET BY MOUTH EVERY DAY   • methylprednisolone (MEDROL) 4 mg tablet Take 0.5 tablets (2 mg total) by mouth daily with breakfast   • montelukast (SINGULAIR) 10 mg tablet Take 10 mg by mouth daily at bedtime    • SPIRIVA RESPIMAT 2.5 MCG/ACT AERS inhaler Inhale 2 puffs daily as needed    • tirzepatide (Mounjaro) 2.5 MG/0.5ML Inject 0.5 mL (2.5 mg total) under the skin every 7 days   • VENTOLIN  (90 Base) MCG/ACT inhaler INL 2 PFS PO Q 4 H PRF WHZ   • [DISCONTINUED] Blood Glucose Monitoring Suppl (OneTouch Verio Reflect) w/Device KIT Check blood sugars once daily. Please substitute with appropriate alternative as covered by patient's insurance. Dx: E11.65   • [DISCONTINUED] glucose blood (OneTouch Verio) test strip Check blood sugars once daily. Please substitute with appropriate alternative as covered by patient's insurance. Dx: E11.65   • [DISCONTINUED] OneTouch Delica Lancets 93D MISC Check blood sugars once daily. Please substitute with appropriate alternative as covered by patient's insurance. Dx: E11.65   • EPINEPHrine (EPIPEN) 0.3 mg/0.3 mL SOAJ Inject 0.3 mL (0.3 mg total) into a muscle once for 1 dose       Objective     /82 (BP Location: Left arm, Patient Position: Sitting, Cuff Size: Standard)   Pulse 84   Temp (!) 96.9 °F (36.1 °C) (Temporal)   Ht 5' 3" (1.6 m) Comment: on file  Wt 95.7 kg (211 lb)   BMI 37.38 kg/m²   Patient's shoes and socks removed. Right Foot/Ankle   Right Foot Inspection  Skin Exam: skin normal. Skin not intact, no dry skin, no warmth, no callus, no erythema, no maceration, no abnormal color, no pre-ulcer, no ulcer and no callus. Toe Exam: ROM and strength within normal limits. No swelling, no tenderness, erythema and  no right toe deformity    Sensory   Proprioception: intact  Monofilament testing: intact    Vascular  Capillary refills: < 3 seconds  The right DP pulse is 2+. The right PT pulse is 2+. Right Toe  - Comprehensive Exam  Ecchymosis: none  Arch: normal  Hammertoes: absent  Claw Toes: absent  Swelling: none   Tenderness: none         Left Foot/Ankle  Left Foot Inspection  Skin Exam: skin normal. Skin not intact, no dry skin, no warmth, no erythema, no maceration, normal color, no pre-ulcer, no ulcer and no callus. Toe Exam: ROM and strength within normal limits.  No swelling, no tenderness, no erythema and no left toe deformity. Sensory   Proprioception: intact  Monofilament testing: intact    Vascular  Capillary refills: < 3 seconds  The left DP pulse is 2+. The left PT pulse is 2+. Left Toe  - Comprehensive Exam  Ecchymosis: none  Arch: normal  Hammertoes: absent  Claw toes: absent  Swelling: none   Tenderness: none             Assign Risk Category  No deformity present  No loss of protective sensation  No weak pulses  Risk: 0    Physical Exam  Vitals and nursing note reviewed. Constitutional:       General: She is not in acute distress. Appearance: She is well-developed. She is not diaphoretic. HENT:      Head: Normocephalic and atraumatic. Eyes:      General:         Right eye: No discharge. Left eye: No discharge. Conjunctiva/sclera: Conjunctivae normal.   Neck:      Vascular: No carotid bruit. Cardiovascular:      Rate and Rhythm: Normal rate and regular rhythm. Pulses: no weak pulses          Dorsalis pedis pulses are 2+ on the right side and 2+ on the left side. Posterior tibial pulses are 2+ on the right side and 2+ on the left side. Heart sounds: Normal heart sounds. No murmur heard. No friction rub. No gallop. Pulmonary:      Effort: Pulmonary effort is normal. No respiratory distress. Breath sounds: Normal breath sounds. No wheezing or rales. Musculoskeletal:      Cervical back: Neck supple. Feet:      Right foot:      Skin integrity: No ulcer, skin breakdown, erythema, warmth, callus or dry skin. Left foot:      Skin integrity: No ulcer, skin breakdown, erythema, warmth, callus or dry skin. Skin:     General: Skin is warm and dry. Neurological:      Mental Status: She is alert and oriented to person, place, and time.    Psychiatric:         Judgment: Judgment normal.       Nataliya Gonzáles PA-C

## 2023-08-16 NOTE — TELEPHONE ENCOUNTER
----- Message from Stephanie Allen MA sent at 8/16/2023 10:43 AM EDT -----  Regarding: care gap request  08/16/23 10:43 AM    Hello, our patient attached above has had Diabetic Eye Exam completed/performed.  Please assist in updating the patient chart by making an External outreach to [facility] located in [place] The date of service is 12/2022     Thank you,  Stephanie [facility]

## 2023-08-16 NOTE — TELEPHONE ENCOUNTER
Upon review of the In Basket request we have found that the patient has not yet had the requested item completed or has not established care. Due a phone call I received from doctor patient has not neeb there since 2020s. Due to protocols, we are unable to hold requests for resulting/linking of a future items and are unable to proceed. Patients who have not established care within the Network do not have consents on file, record requests, etc.    Any additional questions or concerns should be emailed to the Practice Liaisons via the appropriate education email address, please do not reply via In Basket.     Thank you  Colonel Lopez

## 2023-08-17 ENCOUNTER — TELEPHONE (OUTPATIENT)
Dept: HEMATOLOGY ONCOLOGY | Facility: CLINIC | Age: 58
End: 2023-08-17

## 2023-08-17 NOTE — TELEPHONE ENCOUNTER
I called Sharath Allen in response to a referral that was received for patient to establish care with Hematology.      Outreach was made to schedule a consultation.     I left a voicemail explaining the reason for my call and advised patient to call [facility] at [phone]. Another attempt will be made to contact patient.

## 2023-08-18 ENCOUNTER — TELEPHONE (OUTPATIENT)
Dept: BARIATRICS | Facility: CLINIC | Age: 58
End: 2023-08-18

## 2023-08-18 ENCOUNTER — CONSULT (OUTPATIENT)
Dept: HEMATOLOGY ONCOLOGY | Facility: CLINIC | Age: 58
End: 2023-08-18
Payer: COMMERCIAL

## 2023-08-18 VITALS
TEMPERATURE: 97.4 F | DIASTOLIC BLOOD PRESSURE: 76 MMHG | HEART RATE: 90 BPM | OXYGEN SATURATION: 98 % | SYSTOLIC BLOOD PRESSURE: 122 MMHG | BODY MASS INDEX: 37.38 KG/M2 | WEIGHT: 211 LBS

## 2023-08-18 DIAGNOSIS — D47.2 IGG GAMMOPATHY: ICD-10-CM

## 2023-08-18 DIAGNOSIS — D47.2 MONOCLONAL GAMMOPATHY: Primary | ICD-10-CM

## 2023-08-18 DIAGNOSIS — E11.9 TYPE 2 DIABETES MELLITUS WITHOUT COMPLICATION, WITHOUT LONG-TERM CURRENT USE OF INSULIN (HCC): Primary | ICD-10-CM

## 2023-08-18 PROCEDURE — 99205 OFFICE O/P NEW HI 60 MIN: CPT

## 2023-08-18 NOTE — TELEPHONE ENCOUNTER
Patient and provider was informed regarding the Mounjaro denial due to the insurance company wanting her to try and fail Trulicity and Victoza. Ellie Smith

## 2023-08-18 NOTE — PROGRESS NOTES
Oncology Outpatient Consult Note  John Ortiz 61 y.o. female MRN: @ Encounter: [id number]        Date:  8/18/2023        CC: Monoclonal Gammopathy       HPI:  John Ortiz is seen for initial consultation 8/18/2023 regarding monoclonal gammopathy. Patient has a PMH of GERD, Asthma, DVT, DM2, Banks's esophagus. Patient reports she has been on long term steroids for her asthma, about 20 years, and currently on a taper and started dupixent per her PCP. Patient was noted to have monoclonal band in the gamma region  M- Peak=0.25g/dL. There was no further follow up. Patient presents today with c/o of frequent fractures, left metatarsal, history of right patella fracture, lower back pain. Patient denies increased fatigue, fevers, frequent infections, drenching night sweats, unintentional weight loss, decreased appetite. Patient denies abnormal bleeding: epistaxis, gingival bleeding, hematuria, dark tarry stools. No HAs or blurry vision. Patient is a non-smoker, does not drink alcohol. Labs:   7/21/23: Hgb 12.4, MCV 90, WBC 6.4, Platelets 896853, Creatinine 0.50, Calcium 9.0, total protein 9.5, Albumin 3.7    Reports no family history of cancer. ROS: As stated in history of present illness otherwise her 14 point review of systems today was negative. ECOG PS: 0     Test Results:    Imaging: No results found.     Labs:   Lab Results   Component Value Date    WBC 6.4 07/21/2023    HGB 12.4 07/21/2023    HCT 37.1 07/21/2023    MCV 90.9 07/21/2023     07/21/2023     Lab Results   Component Value Date    K 4.4 07/21/2023     07/21/2023    CO2 28 07/21/2023    BUN 12 07/21/2023    CREATININE 0.50 07/21/2023    GLUF 98 04/07/2023    CALCIUM 9.0 07/21/2023    CORRECTEDCA 9.7 08/09/2022    AST 18 07/21/2023    ALT 27 07/21/2023    ALKPHOS 71 07/21/2023    EGFR 109 07/21/2023         Lab Results   Component Value Date    SPEP  09/27/2019     The SPEP shows a monoclonal peak in the gamma region. Immunofixation to be performed.  Reviewed by: Nick Abraham DO **Electronic Signature**       Lab Results   Component Value Date    IRON 84 03/02/2022    TIBC 315 03/02/2022    FERRITIN 113 03/02/2022     Active Problems:   Patient Active Problem List   Diagnosis   • Chronic pansinusitis   • Severe frontal headaches   • Severe persistent asthma without complication   • Atopic rhinitis   • Fatty infiltration of liver   • Gastroesophageal reflux disease   • Herpes simplex type 1 infection   • Multiple-type hyperlipidemia   • Migraine with aura   • Long term (current) use of systemic steroids   • Insomnia   • Statin intolerance   • Vitamin D deficiency   • Class 2 severe obesity due to excess calories with serious comorbidity and body mass index (BMI) of 37.0 to 37.9 in adult [facility])   • Type 2 diabetes mellitus without complication, without long-term current use of insulin (HCC)   • Banks's esophagus with esophagitis   • Abnormal CBC   • Weight gain due to medication   • IgG gammopathy   • Acute deep vein thrombosis (DVT) of right lower extremity (HCC)   • Age-related osteoporosis without current pathological fracture   • Allergy to pineapple   • Heat intolerance   • Microalbuminuria   • Diverticulosis   • Hyponatremia   • Frequent nosebleeds   • Chronic fatigue   • Extrinsic asthma without status asthmaticus       Past Medical History:   Past Medical History:   Diagnosis Date   • Asthma    • Banks's esophagus    • GERD (gastroesophageal reflux disease)    • Hyperlipidemia    • Migraine    • Nasal polyposis        Surgical History:   Past Surgical History:   Procedure Laterality Date   • FUNCTIONAL ENDOSCOPIC SINUS SURGERY  2006    [facility] - Dr N[facility] Selby   • [place] STRTCTC CPTR ASSTD [address] [facility] EXTRADURAL CRANIAL Bilateral 9/3/2019    Procedure: IMAGE GUIDED FUNCTIONAL ENDOSCOPIC SINUS SURGERY; Bilateral frontal sinusotomies;  Surgeon: Vinnie De Leon DO;  Location: [facility];  Service: ENT Family History:    Family History   Problem Relation Age of Onset   • Hypothyroidism Mother    • Diabetes Mother    • No Known Problems Father    • No Known Problems Sister    • No Known Problems Sister    • Crohn's disease Daughter    • No Known Problems Maternal Grandmother    • No Known Problems Maternal Grandfather    • No Known Problems Paternal Grandmother    • No Known Problems Paternal Grandfather    • No Known Problems Maternal Aunt    • No Known Problems Maternal Aunt    • No Known Problems Maternal Aunt    • No Known Problems Paternal Aunt    • No Known Problems Paternal Aunt        Cancer-related family history is not on file. Social History:   Social History     Socioeconomic History   • Marital status: /Civil Union     Spouse name: Not on file   • Number of children: Not on file   • Years of education: Not on file   • Highest education level: Not on file   Occupational History   • Not on file   Tobacco Use   • Smoking status: Never   • Smokeless tobacco: Never   Vaping Use   • Vaping Use: Never used   Substance and Sexual Activity   • Alcohol use:  Yes     Alcohol/week: 1.0 standard drink of alcohol     Types: 1 Glasses of wine per week     Comment: No Use per [employer]   • Drug use: Never     Comment: No use   • Sexual activity: Not on file   Other Topics Concern   • Not on file   Social History Narrative    Consumes 3-4 cups of coffee per day     Social Determinants of Health     Financial Resource Strain: Not on file   Food Insecurity: Not on file   Transportation Needs: Not on file   Physical Activity: Not on file   Stress: Not on file   Social Connections: Not on file   Intimate Partner Violence: Not on file   Housing Stability: Not on file       Current Medications:   Current Outpatient Medications   Medication Sig Dispense Refill   • ADVAIR DISKUS 500-50 MCG/DOSE inhaler Inhale 1 puff 2 (two) times a day   3   • albuterol (2.5 mg/3 mL) 0.083 % nebulizer solution TK 3 ML BY NEBULIZATION BID PRN FOR WHEEZING  11   • albuterol (ACCUNEB) 1.25 MG/3ML nebulizer solution Inhale     • Blood Glucose Monitoring Suppl (OneTouch Verio Reflect) w/Device KIT Check blood sugars once daily. Please substitute with appropriate alternative as covered by patient's insurance. Dx: E11.65 1 kit 0   • BREO ELLIPTA 200-25 MCG/INH inhaler Inhale 1 puff daily   6   • DEXILANT 60 MG capsule Take 60 mg by mouth daily   5   • dupilumab (DUPIXENT) subcutaneous injection Inject 300 mg under the skin once      • fexofenadine (ALLEGRA) 180 MG tablet Take 1 tablet (180 mg total) by mouth daily 90 tablet 3   • fluticasone (FLONASE) 50 mcg/act nasal spray 1 spray into each nostril daily 16 g 11   • glucose blood (OneTouch Verio) test strip Check blood sugars once daily. Please substitute with appropriate alternative as covered by patient's insurance. Dx: E11.65 100 each 3   • ipratropium (ATROVENT) 0.02 % nebulizer solution USE 1 AMPULE VIA NEBULIZER 1-2 TIMES DAILY AS NEEDED     • lisinopril (ZESTRIL) 2.5 mg tablet TAKE 1 TABLET BY MOUTH EVERY DAY 90 tablet 1   • methylprednisolone (MEDROL) 4 mg tablet Take 0.5 tablets (2 mg total) by mouth daily with breakfast     • montelukast (SINGULAIR) 10 mg tablet Take 10 mg by mouth daily at bedtime      • OneTouch Delica Lancets 09B MISC Check blood sugars once daily. Please substitute with appropriate alternative as covered by patient's insurance. Dx: E11.65 100 each 3   • SPIRIVA RESPIMAT 2.5 MCG/ACT AERS inhaler Inhale 2 puffs daily as needed   5   • tirzepatide (Mounjaro) 2.5 MG/0.5ML Inject 0.5 mL (2.5 mg total) under the skin every 7 days 2 mL 0   • VENTOLIN  (90 Base) MCG/ACT inhaler INL 2 PFS PO Q 4 H PRF WHZ  5   • EPINEPHrine (EPIPEN) 0.3 mg/0.3 mL SOAJ Inject 0.3 mL (0.3 mg total) into a muscle once for 1 dose 2 each 3     No current facility-administered medications for this visit. Allergies:    Allergies   Allergen Reactions   • Cephalexin Shortness Of Breath   • Levofloxacin Hives   • Statins      Myalgias   • Clindamycin      Unsure of reaction    • Metronidazole      Unsure of reaction   • Penicillins Other (See Comments)     Unknown was hospitalized. • Sulfa Antibiotics Other (See Comments)         Physical Exam:    Body surface area is 1.98 meters squared. Wt Readings from Last 3 Encounters:   08/18/23 95.7 kg (211 lb)   08/16/23 95.7 kg (211 lb)   08/15/23 95.5 kg (210 lb 9.6 oz)        Temp Readings from Last 3 Encounters:   08/18/23 (!) 97.4 °F (36.3 °C) (Temporal)   08/16/23 (!) 96.9 °F (36.1 °C) (Temporal)   04/10/23 99 °F (37.2 °C) (Tympanic)        BP Readings from Last 3 Encounters:   08/18/23 122/76   08/16/23 122/82   08/15/23 120/80         Pulse Readings from Last 3 Encounters:   08/18/23 90   08/16/23 84   08/15/23 87     @LASTSAO2(3)@    Physical Exam     Constitutional   General appearance: No acute distress, well appearing and well nourished. Eyes   Conjunctiva and lids: No swelling, erythema or discharge. Pupils and irises: Equal, round and reactive to light. Ears, Nose, Mouth, and Throat   External inspection of ears and nose: Normal.    Nasal mucosa, septum, and turbinates: Normal without edema or erythema. Oropharynx: Normal with no erythema, edema, exudate or lesions. Pulmonary   Respiratory effort: No increased work of breathing or signs of respiratory distress. Auscultation of lungs: Clear to auscultation. Cardiovascular   Palpation of heart: Normal PMI, no thrills. Auscultation of heart: Normal rate and rhythm, normal S1 and S2, without murmurs. Examination of extremities for edema and/or varicosities: Normal.    Carotid pulses: Normal.    Abdomen   Abdomen: Non-tender, no masses. Liver and spleen: No hepatomegaly or splenomegaly. Lymphatic   Palpation of lymph nodes in neck: No lymphadenopathy.     Musculoskeletal   Gait and station: Normal.    Digits and nails: Normal without clubbing or cyanosis. Inspection/palpation of joints, bones, and muscles: Normal.    Skin   Skin and subcutaneous tissue: Normal without rashes or lesions. Neurologic   Cranial nerves: Cranial nerves 2-12 intact. Sensation: No sensory loss. Psychiatric   Orientation to person, place, and time: Normal.    Mood and affect: Normal.          Assessment/ Plan:  Reviewed labs with patient and discussed plasma cells are the most prolific antibody-producing cells and with a plasma cell disorder/malignancy, they overproduce single antibody in the blood stream, monoclonal protein (M Peak). Reviewed when we notice the monoclonal protein, our workup would begin by ruling out plasma cell disorders/malignancies. These include:  monoclonal gammopathy (which is an asymptomatic premalignant plasma cell disorder); smoldering MM, MM, Waldenstrom's, Amyloidosis which is a condition that causes a build up of proteins in different organs and tissues. In additiona, we also look at [facility], Immunoglobulins and assess for CRAB criteria (hypercalcemia, Renal insufficiency, Anemia, lytic bone lesions) and symptoms. We reviewed the natural history of MGUS highlighting the fact that it has the potential for malignant transformation in approximately 1% of patients per year    Reviewed we will have to obtain SPEP as it has not been repeated since 2019. We will also add FLC, Immunoglobulins, CBCD, CMP. I'm concerned about the recent frequent fractures, therefore, will obtain CT myeloma scan. We will see patient back in the office a week after CT scan. Patient aware to contact us for worsening symptoms or for additional questions/concerns. I spent 60 minutes in chart review, face to face counseling, coordination of care, and documentation.

## 2023-08-21 ENCOUNTER — TELEPHONE (OUTPATIENT)
Dept: BARIATRICS | Facility: CLINIC | Age: 58
End: 2023-08-21

## 2023-08-21 NOTE — TELEPHONE ENCOUNTER
I called the patient on 2 occasions. Unfortunately there was no answer. The voicemail did identify her as "Raghu Galeano."  Left her message that the doctor's office is trying to reach her and to call back. Should the patient call back the message that I wish to relay to her is that her insurance will only cover Victoza and Trulicity for the management of type 2 diabetes. Trulicity is a once a week medication but there is far less weight loss associated with this medication then there is with Victoza. Victoza is read branded as Saxenda for weight loss. Victoza needs to be injected on a daily basis. If she okay with starting Victoza at this time? If so, I will send it to the pharmacy and she is to follow the titration schedule below:    -WEEK 1: 0.6mg daily  -if tolerated WEEK 2: 1.2mg daily  -if tolerated WEEK 3: 1.8mg daily  -if tolerated WEEK 4: 2.4mg daily  -if tolerated WEEK 5: 3mg daily    -IF NAUSEA/VOMITING DEVELOP STOP MEDICATION FOR A FEW DAYS AND DECREASE TO PREVIOUSLY TOLERATED DOSE. STAY HYDRATED. -IF YOU DEVELOP SEVERE ABDOMINAL PAIN WHICH MAY RADIATE TO THE BACK, SOMETIMES ASSOCIATED WITH FEVER, AND VOMITING, STOP MEDICATION AND SEEK URGENT CARE AS THIS MAY BE A SIGN OF PANCREATITIS.      Rosendo Moran

## 2023-08-22 DIAGNOSIS — E11.9 TYPE 2 DIABETES MELLITUS WITHOUT COMPLICATION, WITHOUT LONG-TERM CURRENT USE OF INSULIN (HCC): Primary | ICD-10-CM

## 2023-08-22 RX ORDER — PEN NEEDLE, DIABETIC 32 GX 1/6"
NEEDLE, DISPOSABLE MISCELLANEOUS DAILY
Qty: 90 EACH | Refills: 1 | Status: SHIPPED | OUTPATIENT
Start: 2023-08-22

## 2023-08-22 NOTE — TELEPHONE ENCOUNTER
Inform Pt on your note. She stated she is scared to take the Victoza because of all the side effects. She said she takes an injection for her asthma as well. And different kinds of medications for her allergies. She asked if you can call her sometime today as well.  [phone]

## 2023-08-25 ENCOUNTER — TELEPHONE (OUTPATIENT)
Age: 58
End: 2023-08-25

## 2023-08-25 DIAGNOSIS — D47.2 IGG GAMMOPATHY: ICD-10-CM

## 2023-08-25 DIAGNOSIS — D47.2 MONOCLONAL GAMMOPATHY: Primary | ICD-10-CM

## 2023-08-25 NOTE — TELEPHONE ENCOUNTER
Called patient and left a message on her voicemail to let her know we have to cancel her CT on 8/29 because her insurance did not approve this. We ordered a skeletal survey to be done instead. She can go to any [facility] facility to get this done without an appointment. I left my teams number in case she needs to give me a call back.

## 2023-08-26 DIAGNOSIS — R80.9 MICROALBUMINURIA: ICD-10-CM

## 2023-08-28 ENCOUNTER — TELEPHONE (OUTPATIENT)
Dept: BARIATRICS | Facility: CLINIC | Age: 58
End: 2023-08-28

## 2023-08-28 RX ORDER — LISINOPRIL 2.5 MG/1
TABLET ORAL
Qty: 90 TABLET | Refills: 1 | Status: SHIPPED | OUTPATIENT
Start: 2023-08-28

## 2023-08-28 NOTE — TELEPHONE ENCOUNTER
Pt is calling saying she is not feeling good in the meds, feels nausea and upset stomach. Very concerned about breathing used inhaler all night .  She sais she has really bad asthma

## 2023-08-28 NOTE — TELEPHONE ENCOUNTER
Pt was inform,,, Pt asking if she should cancel the nurse visit . She also said her next appointment to see you is in December she stated if you her to wait this long without medications.

## 2023-09-13 ENCOUNTER — TELEPHONE (OUTPATIENT)
Dept: HEMATOLOGY ONCOLOGY | Facility: CLINIC | Age: 58
End: 2023-09-13

## 2023-09-13 NOTE — TELEPHONE ENCOUNTER
Patient Call    Who are you speaking with? Patient    If it is not the patient, are they listed on an active communication consent form? N/A   What is the reason for this call? Patients insurance has denied her CT. Patient is requesting Megan Faith appeal this decision so that she may have the scan done. Does this require a call back? Yes   If a call back is required, please list best call back number [phone]   If a call back is required, advise that a message will be forwarded to their care team and someone will return their call as soon as possible. Did you relay this information to the patient?  Yes

## 2023-09-14 ENCOUNTER — CLINICAL SUPPORT (OUTPATIENT)
Dept: BARIATRICS | Facility: CLINIC | Age: 58
End: 2023-09-14

## 2023-09-14 VITALS
WEIGHT: 210 LBS | BODY MASS INDEX: 37.21 KG/M2 | HEIGHT: 63 IN | HEART RATE: 89 BPM | RESPIRATION RATE: 17 BRPM | SYSTOLIC BLOOD PRESSURE: 138 MMHG | DIASTOLIC BLOOD PRESSURE: 91 MMHG

## 2023-09-14 DIAGNOSIS — E11.9 TYPE 2 DIABETES MELLITUS WITHOUT COMPLICATION, WITHOUT LONG-TERM CURRENT USE OF INSULIN (HCC): Primary | ICD-10-CM

## 2023-09-14 DIAGNOSIS — R63.5 ABNORMAL WEIGHT GAIN: Primary | ICD-10-CM

## 2023-09-14 PROCEDURE — RECHECK

## 2023-09-14 RX ORDER — DULAGLUTIDE 0.75 MG/.5ML
0.75 INJECTION, SOLUTION SUBCUTANEOUS
Qty: 2 ML | Refills: 0 | Status: SHIPPED | OUTPATIENT
Start: 2023-09-14 | End: 2023-09-22

## 2023-09-14 NOTE — PROGRESS NOTES
Patient did not tolerate Victoza due to GI side effects. She presented today for a nurse visit and reported that this medication has been stopped. She inquired if she can be tried on a different medication. I had originally prescribed Mounjaro which was rejected as her insurance formulary requires her to be on Victoza or Trulicity. Given that she has failed Victoza I will prescribe Trulicity 6.97 mg weekly and she will come in for another nurse visit in 1 month.     [place] May

## 2023-09-14 NOTE — TELEPHONE ENCOUNTER
Spoke to patient. She will plan on getting the skeletal survey done prior to her appointment. She knows to call with questions/concerns in the meantime.

## 2023-09-14 NOTE — TELEPHONE ENCOUNTER
Attempted to call patient to let her know we are going to start with a skeletal survey and go from there. No answer and mailbox full. Will keep trying to reach her.

## 2023-09-14 NOTE — PROGRESS NOTES
Patient last visit weight:  Patient current visit weight:    If you are taking phentermine or other oral weight loss medications, are you experiencing any of the following symptoms:  Headache:   Blurred Vision:   Chest Pain:   Palpitations: Insomnia:   SPECIFY ORAL MEDICATION AND DOSAGE:     If you are taking an injectable medication,  are you experiencing any of the following symptoms:  Bloating: No  Nausea:No  Vomiting: No  Constipation: No  Diarrhea:NO  SPECIFY INJECTABLE MEDICATION AND CURRENT DOSAGE: Victoza ( Not Taking making Pt sick she stopped)      Vitals:    - Is BP less than 100/60? No  - Is BP greater than 140/90? No  - Is HR greater than 100? No  **If yes to any of the above, have patient relax and repeat in 5-10 minutes**    Repeat values:    - Is BP less than 100/60?  - Is BP greater than 140/90?  - Is HR greater than 100?   **If values remain outside of ranges above, please consult provider for next steps**

## 2023-09-15 ENCOUNTER — APPOINTMENT (OUTPATIENT)
Dept: LAB | Facility: CLINIC | Age: 58
End: 2023-09-15
Payer: COMMERCIAL

## 2023-09-15 DIAGNOSIS — D47.2 MONOCLONAL GAMMOPATHY: ICD-10-CM

## 2023-09-15 LAB
ALBUMIN SERPL BCP-MCNC: 3.7 G/DL (ref 3.5–5)
ALP SERPL-CCNC: 67 U/L (ref 34–104)
ALT SERPL W P-5'-P-CCNC: 28 U/L (ref 7–52)
ANION GAP SERPL CALCULATED.3IONS-SCNC: 7 MMOL/L
AST SERPL W P-5'-P-CCNC: 21 U/L (ref 13–39)
BASOPHILS # BLD AUTO: 0.07 THOUSANDS/ÂΜL (ref 0–0.1)
BASOPHILS NFR BLD AUTO: 1 % (ref 0–1)
BILIRUB SERPL-MCNC: 0.47 MG/DL (ref 0.2–1)
BUN SERPL-MCNC: 12 MG/DL (ref 5–25)
CALCIUM SERPL-MCNC: 8.9 MG/DL (ref 8.4–10.2)
CHLORIDE SERPL-SCNC: 101 MMOL/L (ref 96–108)
CO2 SERPL-SCNC: 26 MMOL/L (ref 21–32)
CREAT SERPL-MCNC: 0.52 MG/DL (ref 0.6–1.3)
EOSINOPHIL # BLD AUTO: 0.21 THOUSAND/ÂΜL (ref 0–0.61)
EOSINOPHIL NFR BLD AUTO: 3 % (ref 0–6)
ERYTHROCYTE [DISTWIDTH] IN BLOOD BY AUTOMATED COUNT: 14 % (ref 11.6–15.1)
GFR SERPL CREATININE-BSD FRML MDRD: 104 ML/MIN/1.73SQ M
GLUCOSE P FAST SERPL-MCNC: 94 MG/DL (ref 65–99)
HCT VFR BLD AUTO: 37.7 % (ref 34.8–46.1)
HGB BLD-MCNC: 12.6 G/DL (ref 11.5–15.4)
IGA SERPL-MCNC: 38 MG/DL (ref 66–433)
IGG SERPL-MCNC: 4829 MG/DL (ref 635–1741)
IGM SERPL-MCNC: 32 MG/DL (ref 45–281)
IMM GRANULOCYTES # BLD AUTO: 0.04 THOUSAND/UL (ref 0–0.2)
IMM GRANULOCYTES NFR BLD AUTO: 1 % (ref 0–2)
LYMPHOCYTES # BLD AUTO: 2.56 THOUSANDS/ÂΜL (ref 0.6–4.47)
LYMPHOCYTES NFR BLD AUTO: 36 % (ref 14–44)
MCH RBC QN AUTO: 30.8 PG (ref 26.8–34.3)
MCHC RBC AUTO-ENTMCNC: 33.4 G/DL (ref 31.4–37.4)
MCV RBC AUTO: 92 FL (ref 82–98)
MONOCYTES # BLD AUTO: 0.56 THOUSAND/ÂΜL (ref 0.17–1.22)
MONOCYTES NFR BLD AUTO: 8 % (ref 4–12)
NEUTROPHILS # BLD AUTO: 3.68 THOUSANDS/ÂΜL (ref 1.85–7.62)
NEUTS SEG NFR BLD AUTO: 51 % (ref 43–75)
NRBC BLD AUTO-RTO: 0 /100 WBCS
PLATELET # BLD AUTO: 199 THOUSANDS/UL (ref 149–390)
PMV BLD AUTO: 12.4 FL (ref 8.9–12.7)
POTASSIUM SERPL-SCNC: 4 MMOL/L (ref 3.5–5.3)
PROT SERPL-MCNC: 9.8 G/DL (ref 6.4–8.4)
RBC # BLD AUTO: 4.09 MILLION/UL (ref 3.81–5.12)
SODIUM SERPL-SCNC: 134 MMOL/L (ref 135–147)
WBC # BLD AUTO: 7.12 THOUSAND/UL (ref 4.31–10.16)

## 2023-09-15 PROCEDURE — 84165 PROTEIN E-PHORESIS SERUM: CPT

## 2023-09-15 PROCEDURE — 83521 IG LIGHT CHAINS FREE EACH: CPT

## 2023-09-15 PROCEDURE — 82784 ASSAY IGA/IGD/IGG/IGM EACH: CPT

## 2023-09-15 PROCEDURE — 36415 COLL VENOUS BLD VENIPUNCTURE: CPT

## 2023-09-15 PROCEDURE — 85025 COMPLETE CBC W/AUTO DIFF WBC: CPT

## 2023-09-15 PROCEDURE — 80053 COMPREHEN METABOLIC PANEL: CPT

## 2023-09-15 PROCEDURE — 86334 IMMUNOFIX E-PHORESIS SERUM: CPT

## 2023-09-16 LAB
KAPPA LC FREE SER-MCNC: 12.1 MG/L (ref 3.3–19.4)
KAPPA LC FREE/LAMBDA FREE SER: 3.18 {RATIO} (ref 0.26–1.65)
LAMBDA LC FREE SERPL-MCNC: 3.8 MG/L (ref 5.7–26.3)

## 2023-09-18 LAB
ALBUMIN SERPL ELPH-MCNC: 4.07 G/DL (ref 3.2–5.1)
ALBUMIN SERPL ELPH-MCNC: 42 % (ref 48–70)
ALPHA1 GLOB SERPL ELPH-MCNC: 0.26 G/DL (ref 0.15–0.47)
ALPHA1 GLOB SERPL ELPH-MCNC: 2.7 % (ref 1.8–7)
ALPHA2 GLOB SERPL ELPH-MCNC: 0.68 G/DL (ref 0.42–1.04)
ALPHA2 GLOB SERPL ELPH-MCNC: 7 % (ref 5.9–14.9)
BETA GLOB ABNORMAL SERPL ELPH-MCNC: 0.39 G/DL (ref 0.31–0.57)
BETA1 GLOB SERPL ELPH-MCNC: 4 % (ref 4.7–7.7)
BETA2 GLOB SERPL ELPH-MCNC: 3.7 % (ref 3.1–7.9)
BETA2+GAMMA GLOB SERPL ELPH-MCNC: 0.36 G/DL (ref 0.2–0.58)
GAMMA GLOB ABNORMAL SERPL ELPH-MCNC: 3.94 G/DL (ref 0.4–1.66)
GAMMA GLOB SERPL ELPH-MCNC: 40.6 % (ref 6.9–22.3)
IGG/ALB SER: 0.72 {RATIO} (ref 1.1–1.8)
INTERPRETATION UR IFE-IMP: NORMAL
M PROTEIN 1 MFR SERPL ELPH: 7.1 %
M PROTEIN 1 SERPL ELPH-MCNC: 0.69 G/DL
PROT PATTERN SERPL ELPH-IMP: ABNORMAL
PROT SERPL-MCNC: 9.7 G/DL (ref 6.4–8.4)

## 2023-09-18 PROCEDURE — 84165 PROTEIN E-PHORESIS SERUM: CPT | Performed by: STUDENT IN AN ORGANIZED HEALTH CARE EDUCATION/TRAINING PROGRAM

## 2023-09-18 PROCEDURE — 86334 IMMUNOFIX E-PHORESIS SERUM: CPT | Performed by: STUDENT IN AN ORGANIZED HEALTH CARE EDUCATION/TRAINING PROGRAM

## 2023-09-22 ENCOUNTER — TELEPHONE (OUTPATIENT)
Dept: BARIATRICS | Facility: CLINIC | Age: 58
End: 2023-09-22

## 2023-09-22 DIAGNOSIS — E11.9 TYPE 2 DIABETES MELLITUS WITHOUT COMPLICATION, WITHOUT LONG-TERM CURRENT USE OF INSULIN (HCC): ICD-10-CM

## 2023-09-22 RX ORDER — DULAGLUTIDE 1.5 MG/.5ML
1.5 INJECTION, SOLUTION SUBCUTANEOUS
Qty: 2 ML | Refills: 1 | Status: SHIPPED | OUTPATIENT
Start: 2023-09-22

## 2023-09-22 NOTE — TELEPHONE ENCOUNTER
Pt is asking to send her script to [facility] in file.

## 2023-09-26 ENCOUNTER — TELEPHONE (OUTPATIENT)
Dept: HEMATOLOGY ONCOLOGY | Facility: CLINIC | Age: 58
End: 2023-09-26

## 2023-09-26 ENCOUNTER — TELEPHONE (OUTPATIENT)
Dept: BARIATRICS | Facility: CLINIC | Age: 58
End: 2023-09-26

## 2023-09-26 NOTE — TELEPHONE ENCOUNTER
Patient Call    Who are you speaking with? Patient    If it is not the patient, are they listed on an active communication consent form? N/A   What is the reason for this call? Patient calling in regards to her appointment tomorrow. The patient was unable to do the skeletal survey. Patient would like to know if she should keep her appointment or not. Does this require a call back? Yes   If a call back is required, please list [facility] call back number [phone]   If a call back is required, advise that a message will be forwarded to their care team and someone will return their call as soon as possible. Did you relay this information to the patient?  Yes

## 2023-09-26 NOTE — TELEPHONE ENCOUNTER
Spoke with patient to let her know Ree Rupali would like patient to get her bone survey XR done prior to appt and we will reschedule her appt one week after scan is completed. Pt stated she will be away 10/3-10/9 and she will call to make appointment when she finishes her scan.  Pt verbalized understanding and left direct number for call back with any questions or concerns

## 2023-09-26 NOTE — TELEPHONE ENCOUNTER
Pt called saying if she can come in and be shown how to take injection, she is not sure on how to injection and wants learn at least once.

## 2023-09-29 ENCOUNTER — TELEPHONE (OUTPATIENT)
Dept: BARIATRICS | Facility: CLINIC | Age: 58
End: 2023-09-29

## 2023-09-29 NOTE — TELEPHONE ENCOUNTER
She is going to be more likely to be sick on mounjaro. The medication is stronger than victoza and trulicity and she had GI issues with both. She needs to take a break from medication and wait for her symptoms to completely resolve. If the pain does get worse or she is having uncontrolled vomiting I would go to ED. Also if having signs of dehydration such as dizziness or dark urine I would go to ED. If you can send a message back to his inbasket after she gets the message we can leave it up to his discretion when he gets back if he does want to rx the mounjaro.

## 2023-09-29 NOTE — TELEPHONE ENCOUNTER
Pt called saying she is completley sick of the injection Trulicity and been having cramps , vomiting, aches and pains and cant leave bathroom , she asking to switch med's to [facility], she is a Dr.K Post

## 2023-10-10 ENCOUNTER — TELEPHONE (OUTPATIENT)
Dept: BARIATRICS | Facility: CLINIC | Age: 58
End: 2023-10-10

## 2023-10-10 DIAGNOSIS — E11.9 TYPE 2 DIABETES MELLITUS WITHOUT COMPLICATION, WITHOUT LONG-TERM CURRENT USE OF INSULIN (HCC): Primary | ICD-10-CM

## 2023-10-10 NOTE — TELEPHONE ENCOUNTER
Patient called to discuss previous message from the provider. Patient states that she has been feeling much better for the past 2 weeks. She has contacted her PCP. Patient states that PCP advised her not to inject dupixent and any other injection after another, to space them out. Patient also mention that PCP is strongly agreeing on her to try mounjaro. Patient also mention she wants to try the [facility] and she stated that she called the insurance and was informed that because she has failed on trulicity and victoza she can get it approved. Patient mention if your okay with prescribing the mounjaro it can go to the [facility] on file already.  Please advise

## 2023-10-11 DIAGNOSIS — E78.2 MULTIPLE-TYPE HYPERLIPIDEMIA: ICD-10-CM

## 2023-10-11 DIAGNOSIS — E11.9 TYPE 2 DIABETES MELLITUS WITHOUT COMPLICATION, WITHOUT LONG-TERM CURRENT USE OF INSULIN (HCC): ICD-10-CM

## 2023-10-11 DIAGNOSIS — E11.9 TYPE 2 DIABETES MELLITUS WITHOUT COMPLICATION, WITHOUT LONG-TERM CURRENT USE OF INSULIN (HCC): Primary | ICD-10-CM

## 2023-10-11 RX ORDER — LANCETS 30 GAUGE
EACH MISCELLANEOUS
Qty: 1 KIT | Refills: 0 | Status: SHIPPED | OUTPATIENT
Start: 2023-10-11

## 2023-10-12 ENCOUNTER — TELEPHONE (OUTPATIENT)
Dept: BARIATRICS | Facility: CLINIC | Age: 58
End: 2023-10-12

## 2023-10-12 DIAGNOSIS — E11.9 TYPE 2 DIABETES MELLITUS WITHOUT COMPLICATION, WITHOUT LONG-TERM CURRENT USE OF INSULIN (HCC): Primary | ICD-10-CM

## 2023-10-12 NOTE — TELEPHONE ENCOUNTER
I need a letter for why Trulicity didn't help the patient with detail sides affects on the medication, Thank you .

## 2023-10-12 NOTE — TELEPHONE ENCOUNTER
Patient called to have her script for [facility] sent to [facility] in [place]. Pharmacy on file already.

## 2023-10-13 ENCOUNTER — TELEPHONE (OUTPATIENT)
Dept: BARIATRICS | Facility: CLINIC | Age: 58
End: 2023-10-13

## 2023-10-18 ENCOUNTER — CLINICAL SUPPORT (OUTPATIENT)
Dept: BARIATRICS | Facility: CLINIC | Age: 58
End: 2023-10-18

## 2023-10-18 VITALS
BODY MASS INDEX: 36.39 KG/M2 | HEIGHT: 63 IN | HEART RATE: 78 BPM | WEIGHT: 205.4 LBS | SYSTOLIC BLOOD PRESSURE: 125 MMHG | DIASTOLIC BLOOD PRESSURE: 78 MMHG | RESPIRATION RATE: 16 BRPM

## 2023-10-18 DIAGNOSIS — R63.5 ABNORMAL WEIGHT GAIN: Primary | ICD-10-CM

## 2023-10-18 PROCEDURE — RECHECK

## 2023-10-18 NOTE — PROGRESS NOTES
Patient last visit weight:  Patient current visit weight:    If you are taking phentermine or other oral weight loss medications, are you experiencing any of the following symptoms:  Headache:   Blurred Vision:   Chest Pain:   Palpitations: Insomnia:   SPECIFY ORAL MEDICATION AND DOSAGE:     If you are taking an injectable medication,  are you experiencing any of the following symptoms:  Bloating: No  Nausea: On and Off  Vomiting: No  Constipation: yes  Diarrhea:NO  SPECIFY INJECTABLE MEDICATION AND CURRENT DOSAGE: Mounjaro      Vitals:    Is BP less than 100/60? NO  Is BP greater than 140/90? No  Is HR greater than 100? No  **If yes to any of the above, have patient relax and repeat in 5-10 minutes**    Repeat values:    Is BP less than 100/60? Is BP greater than 140/90? Is HR greater than 100?   **If values remain outside of ranges above, please consult provider for next steps**

## 2023-10-19 ENCOUNTER — HOSPITAL ENCOUNTER (OUTPATIENT)
Dept: RADIOLOGY | Facility: HOSPITAL | Age: 58
Discharge: HOME | End: 2023-10-19
Payer: COMMERCIAL

## 2023-10-19 DIAGNOSIS — D47.2 MONOCLONAL GAMMOPATHY: ICD-10-CM

## 2023-10-19 DIAGNOSIS — D47.2 IGG GAMMOPATHY: ICD-10-CM

## 2023-10-19 PROCEDURE — 77075 RADEX OSSEOUS SURVEY COMPL: CPT

## 2023-10-20 ENCOUNTER — OFFICE VISIT (OUTPATIENT)
Dept: FAMILY MEDICINE CLINIC | Facility: CLINIC | Age: 58
End: 2023-10-20

## 2023-10-20 VITALS
DIASTOLIC BLOOD PRESSURE: 80 MMHG | WEIGHT: 207 LBS | TEMPERATURE: 98.1 F | SYSTOLIC BLOOD PRESSURE: 120 MMHG | BODY MASS INDEX: 36.68 KG/M2 | HEART RATE: 84 BPM | HEIGHT: 63 IN

## 2023-10-20 DIAGNOSIS — K59.03 DRUG-INDUCED CONSTIPATION: ICD-10-CM

## 2023-10-20 DIAGNOSIS — K12.0 CANKER SORES ORAL: Primary | ICD-10-CM

## 2023-10-20 PROBLEM — K59.00 CONSTIPATION: Status: ACTIVE | Noted: 2023-10-20

## 2023-10-20 RX ORDER — ACYCLOVIR 50 MG/G
OINTMENT TOPICAL
Qty: 5 G | Refills: 1 | Status: SHIPPED | OUTPATIENT
Start: 2023-10-20

## 2023-10-20 NOTE — ASSESSMENT & PLAN NOTE
Diagnosis explained. Patient has never had 1 before. It is self-limiting but patient is complaining of significant pain and swelling. Patient prefers prescription so did prescribe acyclovir ointment to put on up to 6 times a day. If this is not available from her pharmacy she can go with an over-the-counter Abreva as directed. She can also use Anbesol directly to the spot for pain relief.

## 2023-10-20 NOTE — ASSESSMENT & PLAN NOTE
Increase fruits and vegetables and fiber water 6 to 8 glasses a day. May also use Colace which is generic docusate sodium 100 mg twice daily.

## 2023-10-20 NOTE — PATIENT INSTRUCTIONS
Problem List Items Addressed This Visit          Digestive    Canker sores oral - Primary     Diagnosis explained. Patient has never had 1 before. It is self-limiting but patient is complaining of significant pain and swelling. Patient prefers prescription so did prescribe acyclovir ointment to put on up to 6 times a day. If this is not available from her pharmacy she can go with an over-the-counter Abreva as directed. She can also use Anbesol directly to the spot for pain relief. Relevant Medications    acyclovir (ZOVIRAX) 5 % ointment       Other    Constipation     Increase fruits and vegetables and fiber water 6 to 8 glasses a day. May also use Colace which is generic docusate sodium 100 mg twice daily.

## 2023-10-20 NOTE — PROGRESS NOTES
Daisy Short  Name: Dion Cooks      : 1965      MRN: [id number]  Encounter Provider: Vinnie Evreett PA-C  Encounter Date: 10/20/2023   Encounter department: [facility] PRIMARY CARE    Assessment & Plan     1. Canker sores oral  Assessment & Plan:  Diagnosis explained. Patient has never had 1 before. It is self-limiting but patient is complaining of significant pain and swelling. Patient prefers prescription so did prescribe acyclovir ointment to put on up to 6 times a day. If this is not available from her pharmacy she can go with an over-the-counter Abreva as directed. She can also use Anbesol directly to the spot for pain relief. Orders:  -     acyclovir (ZOVIRAX) 5 % ointment; Apply to affected areas up to 6 times a day. 2. Drug-induced constipation  Assessment & Plan:  Increase fruits and vegetables and fiber water 6 to 8 glasses a day. May also use Colace which is generic docusate sodium 100 mg twice daily. Subjective      Patient presents with:  Lip Swelling: C/o lower lip swollen, painful, white lesion. Onset yesterday. Patient has never had canker sores in her life. She states that the sore on her bottom lip is plateauing and started roughly 2 days ago she tried ice. She does not feel ill or like she is coming down with anything otherwise. Review of Systems   Constitutional: Negative. HENT: Negative. Eyes: Negative. Respiratory: Negative. Cardiovascular: Negative. Gastrointestinal: Negative. Endocrine: Negative. Genitourinary: Negative. Musculoskeletal: Negative. Skin:  Positive for wound. Allergic/Immunologic: Negative. Neurological: Negative. Hematological: Negative. Psychiatric/Behavioral: Negative.          Current Outpatient Medications on File Prior to Visit   Medication Sig   • ADVAIR DISKUS 500-50 MCG/DOSE inhaler Inhale 1 puff 2 (two) times a day    • albuterol (2.5 mg/3 mL) 0.083 % nebulizer solution TK 3 ML BY NEBULIZATION BID PRN FOR WHEEZING   • albuterol (ACCUNEB) 1.25 MG/3ML nebulizer solution Inhale   • Blood Glucose Monitoring Suppl (OneTouch Verio Reflect) w/Device KIT CHECK BLOOD SUGARS ONCE DAILY. PLEASE SUBSTITUTE WITH APPROPRIATE ALTERNATIVE AS COVERED BY PATIENT'S INSURANCE. DX: E11.65   • BREO ELLIPTA 200-25 MCG/INH inhaler Inhale 1 puff daily    • DEXILANT 60 MG capsule Take 60 mg by mouth daily    • dupilumab (DUPIXENT) subcutaneous injection Inject 300 mg under the skin once    • fexofenadine (ALLEGRA) 180 MG tablet Take 1 tablet (180 mg total) by mouth daily   • fluticasone (FLONASE) 50 mcg/act nasal spray 1 spray into each nostril daily   • glucose blood (OneTouch Verio) test strip Check blood sugars once daily. Please substitute with appropriate alternative as covered by patient's insurance. Dx: E11.65   • ipratropium (ATROVENT) 0.02 % nebulizer solution USE 1 AMPULE VIA NEBULIZER 1-2 TIMES DAILY AS NEEDED   • lisinopril (ZESTRIL) 2.5 mg tablet TAKE 1 TABLET BY MOUTH EVERY DAY   • methylprednisolone (MEDROL) 4 mg tablet Take 0.5 tablets (2 mg total) by mouth daily with breakfast   • montelukast (SINGULAIR) 10 mg tablet Take 10 mg by mouth daily at bedtime    • OneTouch Delica Lancets 50X MISC Check blood sugars once daily. Please substitute with appropriate alternative as covered by patient's insurance. Dx: E11.65   • SPIRIVA RESPIMAT 2.5 MCG/ACT AERS inhaler Inhale 2 puffs daily as needed    • tirzepatide (Mounjaro) 2.5 MG/0.5ML Inject 0.5 mL (2.5 mg total) under the skin every 7 days   • VENTOLIN  (90 Base) MCG/ACT inhaler INL 2 PFS PO Q 4 H PRF WHZ   • EPINEPHrine (EPIPEN) 0.3 mg/0.3 mL SOAJ Inject 0.3 mL (0.3 mg total) into a muscle once for 1 dose       Objective     /80   Pulse 84   Temp 98.1 °F (36.7 °C)   Ht 5' 3" (1.6 m)   Wt 93.9 kg (207 lb)   BMI 36.67 kg/m²     Physical Exam  Vitals and nursing note reviewed.    Constitutional:       General: She is not in acute distress. Appearance: She is well-developed. She is not diaphoretic. HENT:      Head: Normocephalic and atraumatic. Nose: Nose normal.      Mouth/Throat:        Comments: Middle bottom lip with a canker sore apparent roughly 3 mm with mild generalized swelling around the area. Eyes:      General:         Right eye: No discharge. Left eye: No discharge. Conjunctiva/sclera: Conjunctivae normal.   Neck:      Vascular: No carotid bruit. Cardiovascular:      Rate and Rhythm: Normal rate. Heart sounds: No murmur heard. No friction rub. No gallop. Pulmonary:      Effort: Pulmonary effort is normal. No respiratory distress. Breath sounds: No wheezing or rales. Musculoskeletal:      Cervical back: Neck supple. Skin:     General: Skin is warm and dry. Neurological:      Mental Status: She is alert and oriented to person, place, and time.    Psychiatric:         Judgment: Judgment normal.       Naveen Lee PA-C

## 2023-10-23 ENCOUNTER — TELEPHONE (OUTPATIENT)
Dept: BARIATRICS | Facility: CLINIC | Age: 58
End: 2023-10-23

## 2023-10-23 NOTE — TELEPHONE ENCOUNTER
Patient called to inform you that she had side effects with mounjaro. She stated that she didn't have no headaches, no rash, no headaches.

## 2023-10-30 ENCOUNTER — TELEPHONE (OUTPATIENT)
Dept: BARIATRICS | Facility: CLINIC | Age: 58
End: 2023-10-30

## 2023-10-30 DIAGNOSIS — E11.9 TYPE 2 DIABETES MELLITUS WITHOUT COMPLICATION, WITHOUT LONG-TERM CURRENT USE OF INSULIN (HCC): ICD-10-CM

## 2023-10-30 DIAGNOSIS — E11.9 TYPE 2 DIABETES MELLITUS WITHOUT COMPLICATION, WITHOUT LONG-TERM CURRENT USE OF INSULIN (HCC): Primary | ICD-10-CM

## 2023-10-30 NOTE — TELEPHONE ENCOUNTER
Good Day Dr. Janet Irwin,    Patient is calling requesting to have her Mounjaro dose increased. Next office visit is December 19, 2023. Thank you.

## 2023-10-30 NOTE — TELEPHONE ENCOUNTER
Good Day Dr. Garcias Early,    Patient calls and is requesting Livingston Shells be sent to [facility] in [facility]. You had sent in earlier today, but patient would like medication at an alternate pharmacy location. Pharmacy is in patient's demographics. Thank you.

## 2023-10-31 ENCOUNTER — TELEPHONE (OUTPATIENT)
Age: 58
End: 2023-10-31

## 2023-10-31 NOTE — TELEPHONE ENCOUNTER
Follow up appointment scheduled for 11/2/23 at 10am with Aleja. Left detailed message on patient's voicemail letting her know the details and asked that she call back to let us know that this appointment time is ok.

## 2023-11-01 ENCOUNTER — TELEPHONE (OUTPATIENT)
Dept: HEMATOLOGY ONCOLOGY | Facility: CLINIC | Age: 58
End: 2023-11-01

## 2023-11-01 NOTE — TELEPHONE ENCOUNTER
Appointment Change  Cancel, Reschedule, Change to Virtual      Who are you speaking with? Patient   If it is not the patient, is the caller listed on the communication consent form? Yes   Which provider is the appointment scheduled with? ALPHONSE Queen   When was the original appointment scheduled? Please list date and time 11/2   At which location is the appointment scheduled to take place? [facility]   Was the appointment rescheduled? Was the appointment changed from an in person visit to a virtual visit? If so, please list the details of the change. 11/28 8am   What is the reason for the appointment change? job       Was STAR transport scheduled? No   Does STAR transport need to be scheduled for the new visit (if applicable) No   Does the patient need an infusion appointment rescheduled? No   Does the patient have an upcoming infusion appointment scheduled? If so, when? No   Is the patient undergoing chemotherapy? No   For appointments cancelled with less than 24 hours:  Was the no-show policy reviewed?  Yes

## 2023-11-02 ENCOUNTER — TELEPHONE (OUTPATIENT)
Dept: HEMATOLOGY ONCOLOGY | Facility: CLINIC | Age: 58
End: 2023-11-02

## 2023-11-02 NOTE — TELEPHONE ENCOUNTER
Noted patient had rescheduled her appointment from today 11/2 to 11/28/2023. Spoke with patient informed her that we saw lytic lesion on her bone scan therefore, we need to discuss the plan moving forward and had like to see her sooner than the 28th. Patient agreeable to Monday at 1230. Appointment changed to 11/6/2023 at 1230.

## 2023-11-06 ENCOUNTER — OFFICE VISIT (OUTPATIENT)
Dept: HEMATOLOGY ONCOLOGY | Facility: CLINIC | Age: 58
End: 2023-11-06
Payer: COMMERCIAL

## 2023-11-06 VITALS
WEIGHT: 203 LBS | RESPIRATION RATE: 17 BRPM | DIASTOLIC BLOOD PRESSURE: 80 MMHG | OXYGEN SATURATION: 99 % | BODY MASS INDEX: 35.97 KG/M2 | TEMPERATURE: 97.9 F | SYSTOLIC BLOOD PRESSURE: 118 MMHG | HEIGHT: 63 IN | HEART RATE: 104 BPM

## 2023-11-06 DIAGNOSIS — D47.2 BICLONAL GAMMOPATHY: Primary | ICD-10-CM

## 2023-11-06 PROCEDURE — 99214 OFFICE O/P EST MOD 30 MIN: CPT

## 2023-11-07 ENCOUNTER — TELEPHONE (OUTPATIENT)
Dept: HEMATOLOGY ONCOLOGY | Facility: CLINIC | Age: 58
End: 2023-11-07

## 2023-11-07 NOTE — PROGRESS NOTES
HEMATOLOGY / [address] [place] [address] FOLLOW UP NOTE    Primary Care Provider: Shannon Jarvis PA-C  Referring Provider:    MRN: [id number]  : 1965    Reason for Encounter: Follow-up monoclonal gammopathy           Interval History: Patient presents for follow-up of her monoclonal gammopathy. She was last seen in the office 2023. Patient has a history of frequent fractures therefore, we had tried to obtain a CT myeloma scan however, her insurance had denied it. We obtained a complete bone survey which was completed 10/19/2023. The imaging revealed approximate 1 cm poorly defined lucent lesion in temporal occipital region and possible additional lucent lesion in frontal calvarium. Denies any headaches, blurry vision. She continues to have back pain, however, no new pains. She denies increased fatigue, fevers, frequent infections, drenching night sweats, unintentional weight loss or decreased appetite. No abnormal bleeding, or blurry vision. Was noted to have M peak of 0.25 g/dL in 2019. We had repeated the blood work after initial office visit. Labs:  9/15/2023: Hgb 12.4, MCV 90.3, platelets 006, WBC 5.7, serum immunofixation shows biclonal immunoglobulins identified as IgG kappa (M-Peak=0.69g/dL) and (M-Peak 2 = 3.22 g/dL), FLCR 3.18          HPI:  David Mead was seen for initial consultation 2023 regarding monoclonal gammopathy. Patient has a PMH of GERD, Asthma, DVT, DM2, Banks's esophagus. Patient reports she has been on long term steroids for her asthma, about 20 years, and currently on a taper and started dupixent per her PCP. Patient was noted to have monoclonal band in the gamma region  M- Peak=0.25g/dL in 2019. There was no further follow up. Patient presents today with c/o of frequent fractures, left metatarsal, history of right patella fracture, lower back pain.  Patient denies increased fatigue, fevers, frequent infections, drenching night sweats, unintentional weight loss, decreased appetite. Patient denies abnormal bleeding: epistaxis, gingival bleeding, hematuria, dark tarry stools. No HAs or blurry vision. Patient is a non-smoker, does not drink alcohol. Labs:   7/21/23: Hgb 12.4, MCV 90, WBC 6.4, Platelets [id number], Creatinine 0.50, Calcium 9.0, total protein 9.5, Albumin 3.7     Reports no family history of cancer. REVIEW OF SYSTEMS:  Please note that a 14-point review of systems was performed to include Constitutional, HEENT, Respiratory, CVS, GI, , Musculoskeletal, Integumentary, Neurologic, Rheumatologic, Endocrinologic, Psychiatric, Lymphatic, and Hematologic/Oncologic systems were reviewed and are negative unless otherwise stated in HPI. Positive and negative findings pertinent to this evaluation are incorporated into the history of present illness.       ECOG PS: 0    PROBLEM LIST:  Patient Active Problem List   Diagnosis    Chronic pansinusitis    Severe frontal headaches    Severe persistent asthma without complication    Atopic rhinitis    Fatty infiltration of liver    Gastroesophageal reflux disease    Herpes simplex type 1 infection    Multiple-type hyperlipidemia    Migraine with aura    Long term (current) use of systemic steroids    Insomnia    Statin intolerance    Vitamin D deficiency    Class 2 severe obesity due to excess calories with serious comorbidity and body mass index (BMI) of 37.0 to 37.9 in adult     Type 2 diabetes mellitus without complication, without long-term current use of insulin (HCC)    Banks's esophagus with esophagitis    Abnormal CBC    Weight gain due to medication    Monoclonal gammopathy    Acute deep vein thrombosis (DVT) of right lower extremity (HCC)    Age-related osteoporosis without current pathological fracture    Allergy to pineapple    Heat intolerance    Microalbuminuria    Diverticulosis    Hyponatremia    Frequent nosebleeds    Chronic fatigue    Extrinsic asthma without status asthmaticus    Canker sores oral    Constipation       Assessment / Plan: Reviewed patient's scan in detail. We spent monoclonal gammopathy and these lesions that were noted on her bone survey imaging, the next step would be to rule out multiple myeloma by obtaining a bone marrow biopsy. I reviewed the procedure with the patient and informed her it takes about 10 to 14 days for the results to come back. Therefore, I will schedule her with Dr. Edna El approximately 2 weeks after her bone marrow biopsy. At this time, she we will review the test results with her in detail and treatment options. Patient understandably very anxious, emotional support provided. Patient agreeable to proceed. Patient aware to contact us for worsening symptoms or for additional questions/concerns. I spent 30 minutes on chart review, face to face counseling time, coordination of care and documentation. Past Medical History:   has a past medical history of Asthma, Banks's esophagus, GERD (gastroesophageal reflux disease), Hyperlipidemia, Migraine, and Nasal polyposis. PAST SURGICAL HISTORY:   has a past surgical history that includes Functional endoscopic sinus surgery (2006) and pr strtctc cptr asstd px extradural cranial (Bilateral, 9/3/2019). CURRENT MEDICATIONS  Current Outpatient Medications   Medication Sig Dispense Refill    acyclovir (ZOVIRAX) 5 % ointment Apply to affected areas up to 6 times a day. 5 g 1    ADVAIR DISKUS 500-50 MCG/DOSE inhaler Inhale 1 puff 2 (two) times a day   3    albuterol (2.5 mg/3 mL) 0.083 % nebulizer solution TK 3 ML BY NEBULIZATION BID PRN FOR WHEEZING  11    albuterol (ACCUNEB) 1.25 MG/3ML nebulizer solution Inhale      Blood Glucose Monitoring Suppl (OneTouch Verio Reflect) w/Device KIT CHECK BLOOD SUGARS ONCE DAILY. PLEASE SUBSTITUTE WITH APPROPRIATE ALTERNATIVE AS COVERED BY PATIENT'S INSURANCE.  DX: E11.65 1 kit 0    BREO ELLIPTA 200-25 MCG/INH inhaler Inhale 1 puff daily   6    DEXILANT 60 MG capsule Take 60 mg by mouth daily   5    dupilumab (DUPIXENT) subcutaneous injection Inject 300 mg under the skin once       EPINEPHrine (EPIPEN) 0.3 mg/0.3 mL SOAJ Inject 0.3 mL (0.3 mg total) into a muscle once for 1 dose 2 each 3    fexofenadine (ALLEGRA) 180 MG tablet Take 1 tablet (180 mg total) by mouth daily 90 tablet 3    fluticasone (FLONASE) 50 mcg/act nasal spray 1 spray into each nostril daily 16 g 11    glucose blood (OneTouch Verio) test strip Check blood sugars once daily. Please substitute with appropriate alternative as covered by patient's insurance. Dx: E11.65 100 each 3    ipratropium (ATROVENT) 0.02 % nebulizer solution USE 1 AMPULE VIA NEBULIZER 1-2 TIMES DAILY AS NEEDED      lisinopril (ZESTRIL) 2.5 mg tablet TAKE 1 TABLET BY MOUTH EVERY DAY 90 tablet 1    methylprednisolone (MEDROL) 4 mg tablet Take 0.5 tablets (2 mg total) by mouth daily with breakfast      montelukast (SINGULAIR) 10 mg tablet Take 10 mg by mouth daily at bedtime       OneTouch Delica Lancets [address] [place], [place] [address]C Check blood sugars once daily. Please substitute with appropriate alternative as covered by patient's insurance. Dx: E11.65 100 each 3    SPIRIVA RESPIMAT 2.5 MCG/ACT AERS inhaler Inhale 2 puffs daily as needed   5    tirzepatide (Mounjaro) 5 MG/0.5ML Inject 0.5 mL (5 mg total) under the skin every 7 days 2 mL 1    VENTOLIN  (90 Base) MCG/ACT inhaler INL 2 PFS PO Q 4 H PRF WHZ  5     No current facility-administered medications for this visit. [unfilled]    SOCIAL HISTORY:   reports that she has never smoked. She has never used smokeless tobacco. She reports current alcohol use of about 1.0 standard drink of alcohol per week. She reports that she does not use drugs.      FAMILY HISTORY:  family history includes Crohn's disease in her daughter; Diabetes in her mother; Hypothyroidism in her mother; No Known Problems in her father, maternal aunt, maternal aunt, maternal aunt, maternal grandfather, maternal grandmother, paternal aunt, paternal aunt, paternal grandfather, paternal grandmother, sister, and sister. ALLERGIES:  is allergic to cephalexin, levofloxacin, statins, clindamycin, metronidazole, penicillins, and sulfa antibiotics. Physical Exam:  Vital Signs:   Visit Vitals  /80 (BP Location: Left arm, Patient Position: Sitting, Cuff Size: Adult)   Pulse 104   Temp 97.9 °F (36.6 °C)   Resp 17   Ht 5' 3" (1.6 m)   Wt 92.1 kg (203 lb)   SpO2 99%   BMI 35.96 kg/m²   OB Status Postmenopausal   Smoking Status Never   BSA 1.95 m²     Body mass index is 35.96 kg/m². Body surface area is 1.95 meters squared. GEN: Alert, awake oriented x3, in no acute distress  HEENT- No pallor, icterus, cyanosis, no oral mucosal lesions,   LAD - no palpable cervical, clavicle, axillary, inguinal LAD  Heart- normal S1 S2, regular rate and rhythm, No murmur, rubs.    Lungs- clear breathing sound bilateral.   Abdomen- soft, Non tender, bowel sounds present  Extremities- No cyanosis, clubbing, edema  Neuro- No focal neurological deficit    Labs:  Lab Results   Component Value Date    WBC 7.12 09/15/2023    HGB 12.6 09/15/2023    HCT 37.7 09/15/2023    MCV 92 09/15/2023     09/15/2023     Lab Results   Component Value Date    SODIUM 134 (L) 09/15/2023    K 4.0 09/15/2023     09/15/2023    CO2 26 09/15/2023    AGAP 7 09/15/2023    BUN 12 09/15/2023    CREATININE 0.52 (L) 09/15/2023    GLUC 118 (H) 09/12/2023    GLUF 94 09/15/2023    CALCIUM 8.9 09/15/2023    AST 21 09/15/2023    ALT 28 09/15/2023    ALKPHOS 67 09/15/2023    TP 9.7 (H) 09/15/2023    TP 9.8 (H) 09/15/2023    TBILI 0.47 09/15/2023    EGFR 104 09/15/2023

## 2023-11-08 ENCOUNTER — TELEPHONE (OUTPATIENT)
Dept: HEMATOLOGY ONCOLOGY | Facility: CLINIC | Age: 58
End: 2023-11-08

## 2023-11-08 NOTE — TELEPHONE ENCOUNTER
Patient Call    Who are you speaking with? Patient    If it is not the patient, are they listed on an active communication consent form? Yes   What is the reason for this call? Nobody called about a special test Aleja wanted   Does this require a call back? Yes   If a call back is required, please list best call back number [phone]    If a call back is required, advise that a message will be forwarded to their care team and someone will return their call as soon as possible. Did you relay this information to the patient?  Yes

## 2023-11-08 NOTE — TELEPHONE ENCOUNTER
Patient called regarding her appt with IR for BMBx. She is worried it's on 11/22. Informed her it is ok to have it at that time. She will call them in the mornings to see if there are any cancellations. Informed her we are working on a follow up for her with Dr. Dima Castillo and our office will reach out to her. Patient voiced understanding.

## 2023-11-09 ENCOUNTER — TELEPHONE (OUTPATIENT)
Dept: HEMATOLOGY ONCOLOGY | Facility: CLINIC | Age: 58
End: 2023-11-09

## 2023-11-09 NOTE — TELEPHONE ENCOUNTER
Left VM for patient to notify her of her appt date and time with Dr. Lisette Milton to review her bmbx results. My direct teams number was left for her to callback.

## 2023-11-13 ENCOUNTER — TELEPHONE (OUTPATIENT)
Dept: BARIATRICS | Facility: CLINIC | Age: 58
End: 2023-11-13

## 2023-11-13 DIAGNOSIS — R11.0 NAUSEA: Primary | ICD-10-CM

## 2023-11-13 RX ORDER — ONDANSETRON 4 MG/1
4 TABLET, FILM COATED ORAL EVERY 8 HOURS PRN
Qty: 20 TABLET | Refills: 0 | Status: SHIPPED | OUTPATIENT
Start: 2023-11-13

## 2023-11-13 NOTE — TELEPHONE ENCOUNTER
Pt askinf if she can have Zofran she been nauseas ever since she started the 5mg dose of Mounjaro , [facility] on file is okay to send medication.

## 2023-11-14 RX ORDER — SODIUM CHLORIDE 9 MG/ML
75 INJECTION, SOLUTION INTRAVENOUS CONTINUOUS
Status: CANCELLED | OUTPATIENT
Start: 2023-11-14

## 2023-11-15 ENCOUNTER — OFFICE VISIT (OUTPATIENT)
Dept: FAMILY MEDICINE CLINIC | Facility: CLINIC | Age: 58
End: 2023-11-15
Payer: COMMERCIAL

## 2023-11-15 VITALS
OXYGEN SATURATION: 98 % | SYSTOLIC BLOOD PRESSURE: 118 MMHG | BODY MASS INDEX: 35.61 KG/M2 | HEART RATE: 84 BPM | TEMPERATURE: 97.7 F | WEIGHT: 201 LBS | DIASTOLIC BLOOD PRESSURE: 74 MMHG | HEIGHT: 63 IN

## 2023-11-15 DIAGNOSIS — J45.50 SEVERE PERSISTENT ASTHMA WITHOUT COMPLICATION: ICD-10-CM

## 2023-11-15 DIAGNOSIS — J01.00 ACUTE NON-RECURRENT MAXILLARY SINUSITIS: Primary | ICD-10-CM

## 2023-11-15 DIAGNOSIS — E11.9 TYPE 2 DIABETES MELLITUS WITHOUT COMPLICATION, WITHOUT LONG-TERM CURRENT USE OF INSULIN (HCC): ICD-10-CM

## 2023-11-15 PROCEDURE — 99214 OFFICE O/P EST MOD 30 MIN: CPT | Performed by: PHYSICIAN ASSISTANT

## 2023-11-15 RX ORDER — AZITHROMYCIN 250 MG/1
TABLET, FILM COATED ORAL
Qty: 6 TABLET | Refills: 1 | Status: SHIPPED | OUTPATIENT
Start: 2023-11-15 | End: 2023-11-20

## 2023-11-15 NOTE — PROGRESS NOTES
Name: Simon Donaldson      : 1965      MRN: [id number]  Encounter Provider: Nerissa Obrien PA-C  Encounter Date: 11/15/2023   Encounter department: [facility] PRIMARY CARE    Assessment & Plan     Patient Instructions   Assessment/plan:  1. Acute sinusitis-patient had multiple home COVID test which were negative over the past 5 days. She will be started on Zithromax Z-Sudhakar as directed. Encourage over-the-counter medications and follow-up if symptoms would be persistent. 2.  Asthma-moderate persistent with mild exacerbation. Continue inhaler therapies but if symptoms worsen would recommend prednisone treatment. 3.  Type 2 diabetes-presently stable. We will try to avoid steroids as they may increase the sugar levels. 1. Acute non-recurrent maxillary sinusitis  -     azithromycin (Zithromax) 250 mg tablet; Take 2 tablets (500 mg total) by mouth daily for 1 day, THEN 1 tablet (250 mg total) daily for 4 days. 2. Type 2 diabetes mellitus without complication, without long-term current use of insulin ([address])    3. Severe persistent asthma without complication           Subjective      HPI: This is a 50-year-old female that presents to the office with concerns over sinus congestion, head pressure, postnasal drip, and cough with green mucus. She has been feeling fatigued and rundown. She did do COVID tests at home yesterday which were negative. Over the past several days her mucus has become more thick and green and she has had more productive cough. She has not had much relief with over-the-counter medications. She states that she did do 4 COVID test over the span of the last 5 days which were all negative. She does have history of asthma and continues inhaler therapy. Review of Systems   Constitutional:  Positive for activity change and fatigue. Negative for fever. HENT:  Positive for congestion, postnasal drip, rhinorrhea, sinus pressure and sore throat. Negative for ear pain. Respiratory:  Positive for cough. Negative for chest tightness, shortness of breath and wheezing. Cardiovascular:  Negative for palpitations. Gastrointestinal:  Negative for diarrhea and nausea. Musculoskeletal:  Positive for myalgias. Negative for arthralgias. Skin:  Negative for rash. Neurological:  Negative for dizziness and numbness. All other systems reviewed and are negative. Current Outpatient Medications on File Prior to Visit   Medication Sig   • ADVAIR DISKUS 500-50 MCG/DOSE inhaler Inhale 1 puff 2 (two) times a day    • albuterol (2.5 mg/3 mL) 0.083 % nebulizer solution TK 3 ML BY NEBULIZATION BID PRN FOR WHEEZING   • albuterol (ACCUNEB) 1.25 MG/3ML nebulizer solution Inhale   • BREO ELLIPTA 200-25 MCG/INH inhaler Inhale 1 puff daily    • DEXILANT 60 MG capsule Take 60 mg by mouth daily    • dupilumab (DUPIXENT) subcutaneous injection Inject 300 mg under the skin once    • fexofenadine (ALLEGRA) 180 MG tablet Take 1 tablet (180 mg total) by mouth daily   • fluticasone (FLONASE) 50 mcg/act nasal spray 1 spray into each nostril daily   • ipratropium (ATROVENT) 0.02 % nebulizer solution USE 1 AMPULE VIA NEBULIZER 1-2 TIMES DAILY AS NEEDED   • lisinopril (ZESTRIL) 2.5 mg tablet TAKE 1 TABLET BY MOUTH EVERY DAY   • methylprednisolone (MEDROL) 4 mg tablet Take 0.5 tablets (2 mg total) by mouth daily with breakfast   • montelukast (SINGULAIR) 10 mg tablet Take 10 mg by mouth daily at bedtime    • ondansetron (ZOFRAN) 4 mg tablet Take 1 tablet (4 mg total) by mouth every 8 (eight) hours as needed for nausea   • SPIRIVA RESPIMAT 2.5 MCG/ACT AERS inhaler Inhale 2 puffs daily as needed    • tirzepatide (Mounjaro) 5 MG/0.5ML Inject 0.5 mL (5 mg total) under the skin every 7 days   • VENTOLIN  (90 Base) MCG/ACT inhaler INL 2 PFS PO Q 4 H PRF WHZ   • acyclovir (ZOVIRAX) 5 % ointment Apply to affected areas up to 6 times a day.  (Patient not taking: Reported on 11/15/2023)   • Blood Glucose Monitoring Suppl (OneTouch Verio Reflect) w/Device KIT CHECK BLOOD SUGARS ONCE DAILY. PLEASE SUBSTITUTE WITH APPROPRIATE ALTERNATIVE AS COVERED BY PATIENT'S INSURANCE. DX: E11.65   • EPINEPHrine (EPIPEN) 0.3 mg/0.3 mL SOAJ Inject 0.3 mL (0.3 mg total) into a muscle once for 1 dose   • glucose blood (OneTouch Verio) test strip Check blood sugars once daily. Please substitute with appropriate alternative as covered by patient's insurance. Dx: Y51.20   • OneTouch Delica Lancets 49S MISC Check blood sugars once daily. Please substitute with appropriate alternative as covered by patient's insurance. Dx: E11.65       Objective     /74 (BP Location: Left arm, Patient Position: Sitting, Cuff Size: Large)   Pulse 84   Temp 97.7 °F (36.5 °C) (Tympanic)   Ht 5' 3" (1.6 m)   Wt 91.2 kg (201 lb)   SpO2 98%   BMI 35.61 kg/m²     Physical Exam  Vitals and nursing note reviewed. Constitutional:       General: She is not in acute distress. Appearance: She is well-developed. HENT:      Head: Normocephalic and atraumatic. Mouth/Throat:      Pharynx: No oropharyngeal exudate. Eyes:      General:         Right eye: No discharge. Left eye: No discharge. Pupils: Pupils are equal, round, and reactive to light. Cardiovascular:      Rate and Rhythm: Normal rate and regular rhythm. Heart sounds: Normal heart sounds. No murmur heard. No friction rub. Pulmonary:      Effort: Pulmonary effort is normal. No respiratory distress. Breath sounds: Normal breath sounds. No wheezing or rales. Musculoskeletal:         General: Normal range of motion. Cervical back: Neck supple. Lymphadenopathy:      Cervical: Cervical adenopathy present. Skin:     General: Skin is warm and dry. Findings: No erythema. Neurological:      Mental Status: She is alert and oriented to person, place, and time.    Psychiatric:         Behavior: Behavior normal.       Eleno Pickard PA-C

## 2023-11-15 NOTE — PATIENT INSTRUCTIONS
Assessment/plan:  1. Acute sinusitis-patient had multiple home COVID test which were negative over the past 5 days. She will be started on Zithromax Z-Sudhakar as directed. Encourage over-the-counter medications and follow-up if symptoms would be persistent. 2.  Asthma-moderate persistent with mild exacerbation. Continue inhaler therapies but if symptoms worsen would recommend prednisone treatment. 3.  Type 2 diabetes-presently stable. We will try to avoid steroids as they may increase the sugar levels.

## 2023-11-22 ENCOUNTER — HOSPITAL ENCOUNTER (OUTPATIENT)
Dept: INTERVENTIONAL RADIOLOGY/VASCULAR | Facility: HOSPITAL | Age: 58
Discharge: HOME | End: 2023-11-22
Payer: COMMERCIAL

## 2023-11-22 VITALS
TEMPERATURE: 96.8 F | DIASTOLIC BLOOD PRESSURE: 77 MMHG | SYSTOLIC BLOOD PRESSURE: 104 MMHG | HEART RATE: 80 BPM | HEIGHT: 63 IN | WEIGHT: 203 LBS | RESPIRATION RATE: 16 BRPM | BODY MASS INDEX: 35.97 KG/M2 | OXYGEN SATURATION: 98 %

## 2023-11-22 DIAGNOSIS — D47.2 BICLONAL GAMMOPATHY: ICD-10-CM

## 2023-11-22 LAB
ERYTHROCYTE [DISTWIDTH] IN BLOOD BY AUTOMATED COUNT: 13.8 % (ref 11.6–15.1)
HCT VFR BLD AUTO: 37.1 % (ref 34.8–46.1)
HGB BLD-MCNC: 12.4 G/DL (ref 11.5–15.4)
MCH RBC QN AUTO: 29.7 PG (ref 26.8–34.3)
MCHC RBC AUTO-ENTMCNC: 33.4 G/DL (ref 31.4–37.4)
MCV RBC AUTO: 89 FL (ref 82–98)
NRBC BLD AUTO-RTO: 0 /100 WBCS
PLATELET # BLD AUTO: 176 THOUSANDS/UL (ref 149–390)
PMV BLD AUTO: 12.3 FL (ref 8.9–12.7)
RBC # BLD AUTO: 4.17 MILLION/UL (ref 3.81–5.12)
WBC # BLD AUTO: 4.68 THOUSAND/UL (ref 4.31–10.16)

## 2023-11-22 PROCEDURE — 38220 DX BONE MARROW ASPIRATIONS: CPT

## 2023-11-22 PROCEDURE — 88185 FLOWCYTOMETRY/TC ADD-ON: CPT

## 2023-11-22 PROCEDURE — 88367 INSITU HYBRIDIZATION AUTO: CPT

## 2023-11-22 PROCEDURE — 38222 DX BONE MARROW BX & ASPIR: CPT

## 2023-11-22 PROCEDURE — 88262 CHROMOSOME ANALYSIS 15-20: CPT

## 2023-11-22 PROCEDURE — 88374 M/PHMTRC ALYS ISHQUANT/SEMIQ: CPT

## 2023-11-22 PROCEDURE — 88237 TISSUE CULTURE BONE MARROW: CPT

## 2023-11-22 PROCEDURE — 88373 M/PHMTRC ALYS ISHQUANT/SEMIQ: CPT

## 2023-11-22 PROCEDURE — 99153 MOD SED SAME PHYS/QHP EA: CPT

## 2023-11-22 PROCEDURE — 88184 FLOWCYTOMETRY/ TC 1 MARKER: CPT

## 2023-11-22 RX ORDER — SODIUM CHLORIDE 9 MG/ML
75 INJECTION, SOLUTION INTRAVENOUS CONTINUOUS
Status: DISCONTINUED | OUTPATIENT
Start: 2023-11-22 | End: 2023-11-23 | Stop reason: HOSPADM

## 2023-11-22 RX ORDER — FENTANYL CITRATE 50 UG/ML
INJECTION, SOLUTION INTRAMUSCULAR; INTRAVENOUS AS NEEDED
Status: COMPLETED | OUTPATIENT
Start: 2023-11-22 | End: 2023-11-22

## 2023-11-22 RX ORDER — ACETAMINOPHEN 325 MG/1
650 TABLET ORAL EVERY 4 HOURS PRN
Status: DISCONTINUED | OUTPATIENT
Start: 2023-11-22 | End: 2023-11-23 | Stop reason: HOSPADM

## 2023-11-22 RX ORDER — MIDAZOLAM HYDROCHLORIDE 2 MG/2ML
INJECTION, SOLUTION INTRAMUSCULAR; INTRAVENOUS AS NEEDED
Status: COMPLETED | OUTPATIENT
Start: 2023-11-22 | End: 2023-11-22

## 2023-11-22 RX ADMIN — FENTANYL CITRATE 50 MCG: 50 INJECTION, SOLUTION INTRAMUSCULAR; INTRAVENOUS at 09:11

## 2023-11-22 RX ADMIN — MIDAZOLAM 0.5 MG: 1 INJECTION INTRAMUSCULAR; INTRAVENOUS at 09:17

## 2023-11-22 RX ADMIN — SODIUM CHLORIDE 75 ML/HR: 0.9 INJECTION, SOLUTION INTRAVENOUS at 08:53

## 2023-11-22 RX ADMIN — MIDAZOLAM 1 MG: 1 INJECTION INTRAMUSCULAR; INTRAVENOUS at 09:10

## 2023-11-22 RX ADMIN — MIDAZOLAM 1 MG: 1 INJECTION INTRAMUSCULAR; INTRAVENOUS at 09:18

## 2023-11-22 RX ADMIN — FENTANYL CITRATE 25 MCG: 50 INJECTION, SOLUTION INTRAMUSCULAR; INTRAVENOUS at 09:17

## 2023-11-22 RX ADMIN — MIDAZOLAM 1 MG: 1 INJECTION INTRAMUSCULAR; INTRAVENOUS at 09:23

## 2023-11-22 NOTE — H&P
Interventional Radiology  History and Physical 11/22/2023     Leodan Benton   1965   [id number]    H&P reviewed. There have been no interval changes since the time the H&P was written. /60   Pulse 71   Temp (!) 96.2 °F (35.7 °C) (Temporal)   Resp (!) 10   Ht 5' 3" (1.6 m)   Wt 92.1 kg (203 lb)   SpO2 100%   BMI 35.96 kg/m²     Here for bone marrow biopsy. Procedure discussed and all questions answered. Informed written consent was obtained.     Reynaldo Contreras MD

## 2023-11-22 NOTE — BRIEF OP NOTE (RAD/CATH)
INTERVENTIONAL RADIOLOGY PROCEDURE NOTE    Date: 11/22/2023    Procedure:   Procedure Summary       Date: 11/22/23 Room / Location: [facility] Interventional Radiology    Anesthesia Start:  Anesthesia Stop:     Procedure: IR BIOPSY BONE MARROW Diagnosis:       Biclonal gammopathy      (biclonal gammopathy)    Scheduled Providers:  Responsible Provider:     Anesthesia Type: Not recorded ASA Status: Not recorded            Preoperative diagnosis:   1. Biclonal gammopathy         Postoperative diagnosis: Same. Surgeon: Netta Berry MD     Assistant: None. No qualified resident was available. Blood loss: None    Specimens: Aspirate and core    Findings: Bone marrow biopsy with image guidance    Complications: None immediate.     Anesthesia: conscious sedation

## 2023-11-22 NOTE — DISCHARGE INSTRUCTIONS
Bone Marrow Biopsy     WHAT YOU NEED TO KNOW:   A bone marrow biopsy is a procedure to remove a small amount of bone marrow from your bone. Bone marrow is the soft tissue inside your bone that helps to make blood cells. The sample is tested for disease or infection. DISCHARGE INSTRUCTIONS:     1. Limit your activities day of biopsy as directed by your doctor. 2. Use medication as ordered. 3. Return to your normal diet. Small sips of flat soda will help with nausea. 4. Remove band-aid or dressing 24 hours after procedure. Contact Interventional Radiology at [phone] Christopher PATIENTS: Contact Interventional Radiology at [phone]) Latisha Pierre PATIENTS: Contact Interventional Radiology at [phone]) if:    1. Difficulty breathing, nausea or vomiting. 2. Chills or fever above 101 F.    3. Pain at biopsy site not relieved by medication. 4. Develop any redness, swelling, heat, unusual drainage, heavy bruising or bleeding from biopsy site. Moderate Sedation   WHAT YOU NEED TO KNOW:   Moderate sedation, or conscious sedation, is medicine used during procedures to help you feel relaxed and calm. You will be awake and able to follow directions without anxiety or pain. You will remember little to none of the procedure. You may feel tired, weak, or unsteady on your feet after you get sedation. You may also have trouble concentrating or short-term memory loss. These symptoms should go away in 24 hours or less. DISCHARGE INSTRUCTIONS:   Call 911 or have someone else call for any of the following: You have sudden trouble breathing. You cannot be woken. Seek care immediately if:   You have a severe headache or dizziness. Your heart is beating faster than usual.  Contact your healthcare provider if:   You have a fever. You have nausea or are vomiting for more than 8 hours after the procedure. Your skin is itchy, swollen, or you have a rash.      You have questions or concerns about your condition or care. Self-care:   Have someone stay with you for 24 hours. This person can drive you to errands and help you do things around the house. This person can also watch for problems. Rest and do quiet activities for 24 hours. Do not exercise, ride a bike, or play sports. Stand up slowly to prevent dizziness and falls. Take short walks around the house with another person. Slowly return to your usual activities the next day. Do not drive or use dangerous machines or tools for 24 hours. You may injure yourself or others. Examples include a lawnmower, saw, or drill. Do not return to work for 24 hours if you use dangerous machines or tools for work. Do not make important decisions for 24 hours. For example, do not sign important papers or invest money. Drink liquids as directed. Liquids help flush the sedation medicine out of your body. Ask how much liquid to drink each day and which liquids are best for you. Eat small, frequent meals to prevent nausea and vomiting. Start with clear liquids such as juice or broth. If you do not vomit after clear liquids, you can eat your usual foods. Do not drink alcohol or take medicines that make you drowsy. This includes medicines that help you sleep and anxiety medicines. Ask your healthcare provider if it is safe for you to take pain medicine. Follow up with your healthcare provider as directed: Write down your questions so you remember to ask them during your visits. © 2017 5 [facility] Information is for End User's use only and may not be sold, redistributed or otherwise used for commercial purposes. All illustrations and images included in CareNotes® are the copyrighted property of A.D.A.M., Inc. or Dieudonne Hernández. The above information is an  only. It is not intended as medical advice for individual conditions or treatments.  Talk to your doctor, nurse or pharmacist before following any medical regimen to see if it is safe and effective for you.

## 2023-11-27 ENCOUNTER — TELEPHONE (OUTPATIENT)
Dept: BARIATRICS | Facility: CLINIC | Age: 58
End: 2023-11-27

## 2023-11-27 DIAGNOSIS — E11.9 TYPE 2 DIABETES MELLITUS WITHOUT COMPLICATION, WITHOUT LONG-TERM CURRENT USE OF INSULIN (HCC): Primary | ICD-10-CM

## 2023-11-27 NOTE — TELEPHONE ENCOUNTER
Patient called [facility]. Patient tolerated previous dose without any incident.

## 2023-11-28 LAB — SCAN RESULT: NORMAL

## 2023-11-29 ENCOUNTER — TELEPHONE (OUTPATIENT)
Dept: HEMATOLOGY ONCOLOGY | Facility: CLINIC | Age: 58
End: 2023-11-29

## 2023-11-29 ENCOUNTER — TELEPHONE (OUTPATIENT)
Age: 58
End: 2023-11-29

## 2023-11-29 LAB — MISCELLANEOUS LAB TEST RESULT: NORMAL

## 2023-11-29 NOTE — TELEPHONE ENCOUNTER
Patient Call    Who are you speaking with? Patient    If it is not the patient, are they listed on an active communication consent form? N/A   What is the reason for this call? Patient would like a call back to go over the results of the test she had done while in the hospital    Does this require a call back? Yes   If a call back is required, please list best call back number [phone]   If a call back is required, advise that a message will be forwarded to their care team and someone will return their call as soon as possible. Did you relay this information to the patient?  Yes

## 2023-11-29 NOTE — TELEPHONE ENCOUNTER
Spoke with patient who wanted to discuss results of bone marrow bx done on 11/22. I informed patient that we do not have the final results back, as they usually take about 2 weeks, and she has a follow up appt on 12/12 with Dr. Tere Hays who will discuss results in full detail at that appointment. I educated patient that in-person appointment is best to discuss results to have time to go over any questions or concerns at that time. Pt verbalized understanding and is in agreement with plan. Pt also stated her son is in the medical field and would like to be called at time of appt to listen on.

## 2023-11-30 LAB
MISCELLANEOUS LAB TEST RESULT: NORMAL
SCAN RESULT: NORMAL

## 2023-12-04 ENCOUNTER — TELEPHONE (OUTPATIENT)
Dept: HEMATOLOGY ONCOLOGY | Facility: CLINIC | Age: 58
End: 2023-12-04

## 2023-12-04 NOTE — TELEPHONE ENCOUNTER
Left a voicemail for patient informing her that we do not have all the test results yet. She will meet with Dr. Mayank Sanches to review all of the biopsy results.   In the meantime if she has any additional questions she can call me, direct team's number provided

## 2023-12-04 NOTE — TELEPHONE ENCOUNTER
Lab Inquiry   Who are you speaking with? Patient     If it is not the patient, are they listed on an active communication consent form? N/A   Name of ordering provider Isamar Guillaume, [address] [place] [address]   What is being requested? Lab results to be reviewed   Lab draw location [facility]   What is the best call back number?  [phone]

## 2023-12-05 ENCOUNTER — TELEPHONE (OUTPATIENT)
Dept: HEMATOLOGY ONCOLOGY | Facility: CLINIC | Age: 58
End: 2023-12-05

## 2023-12-05 NOTE — TELEPHONE ENCOUNTER
[facility] for patient to call back. Direct teams number provided. Alex Ricardo <[phone]>   Sent: Tuesday, December 5, 2023 9:22 AM  To: Sandra Brewster <[email or web address]  Subject: [EXTERNAL] Voice Mail (23 seconds)    WARNING! Based upon the critical issue with ransomware targeting Healthcare systems ALL attachments and links from this email should be heavily scrutinized. Hi, this is this message for Jaci. I'm hoping you be able to give me a call back at [phone]. I apologize, I missed your call yesterday. I know you said some of the tests didn't come back, but if you could give me a call again, I really appreciate it. Thank you so much. You received a voice mail from [address] [place], [place] [address]patti Vasquez.

## 2023-12-05 NOTE — TELEPHONE ENCOUNTER
Spoke with patient, she saw some results come back and wanted to review them. Informed her that we usually like to review when all the tests are back but I know how nervous she is therefore, I will let her know that the flow cytometry from the BMBx is consistent with plasma cell neoplasm. We still have to wait until all the results come back and Dr. Alvarez Collins will be able to review the plan for her in detail as it can become very complicated. Patient voiced understanding and was appreciative of the call.

## 2023-12-06 ENCOUNTER — TRANSCRIBE ORDERS (OUTPATIENT)
Dept: LAB | Facility: HOSPITAL | Age: 58
End: 2023-12-06

## 2023-12-06 DIAGNOSIS — R79.89 ABNORMAL CBC: Primary | ICD-10-CM

## 2023-12-06 DIAGNOSIS — D47.2 BICLONAL GAMMOPATHY: ICD-10-CM

## 2023-12-11 LAB
Lab: NORMAL
Lab: NORMAL
MISCELLANEOUS LAB TEST RESULT: NORMAL

## 2023-12-12 ENCOUNTER — TELEPHONE (OUTPATIENT)
Dept: HEMATOLOGY ONCOLOGY | Facility: CLINIC | Age: 58
End: 2023-12-12

## 2023-12-12 ENCOUNTER — OFFICE VISIT (OUTPATIENT)
Dept: HEMATOLOGY ONCOLOGY | Facility: CLINIC | Age: 58
End: 2023-12-12
Payer: COMMERCIAL

## 2023-12-12 VITALS
HEART RATE: 102 BPM | WEIGHT: 194.6 LBS | SYSTOLIC BLOOD PRESSURE: 120 MMHG | RESPIRATION RATE: 17 BRPM | DIASTOLIC BLOOD PRESSURE: 84 MMHG | OXYGEN SATURATION: 99 % | HEIGHT: 63 IN | TEMPERATURE: 97.8 F | BODY MASS INDEX: 34.48 KG/M2

## 2023-12-12 DIAGNOSIS — C90.00 MULTIPLE MYELOMA NOT HAVING ACHIEVED REMISSION (HCC): Primary | ICD-10-CM

## 2023-12-12 LAB
BASOPHILS NFR BLD MANUAL: 3 % (ref 0–1)
IMM EOSINOPHIL NFR BLD MANUAL: 6 % (ref 0–6)
LYMPHOCYTES NFR BLD: 37 % (ref 14–44)
MONOCYTES NFR BLD AUTO: 9 % (ref 4–12)
NEUTS HYPERSEG BLD QL SMEAR: PRESENT
NEUTS SEG NFR BLD AUTO: 39 % (ref 45–77)
PLATELET BLD QL SMEAR: ADEQUATE
RBC MORPH BLD: NORMAL
TOTAL CELLS COUNTED SPEC: 100
VARIANT LYMPHS BLD QL SMEAR: 6 % (ref 0–0)

## 2023-12-12 PROCEDURE — 99215 OFFICE O/P EST HI 40 MIN: CPT | Performed by: INTERNAL MEDICINE

## 2023-12-12 NOTE — TELEPHONE ENCOUNTER
Scheduled patient's 2nd opinion appointment at [address] [place] [address] with Dr. Javi Pineda on 12/28 at 11am at [facility], [facility] [address] [place] [address]

## 2023-12-13 ENCOUNTER — TELEPHONE (OUTPATIENT)
Age: 58
End: 2023-12-13

## 2023-12-13 NOTE — TELEPHONE ENCOUNTER
Patient called with a few questions/concerns. Patient would like to know what other physicians she should see at [facility]. Patient states that she would like to know if her CT needs contrast or not because she was told that she is not supposed to have contrast. She also mentioned that she read articles that stated the stage of her multiple myeloma is very important. I told her that I will speak with Dr. Madonna Barnes and call her back shortly. Patient was very thankful for the call.

## 2023-12-14 ENCOUNTER — TELEPHONE (OUTPATIENT)
Dept: HEMATOLOGY ONCOLOGY | Facility: CLINIC | Age: 58
End: 2023-12-14

## 2023-12-14 PROBLEM — C90.00 MULTIPLE MYELOMA NOT HAVING ACHIEVED REMISSION (HCC): Status: ACTIVE | Noted: 2023-12-14

## 2023-12-14 NOTE — PROGRESS NOTES
HEMATOLOGY / [address] [place] [address] FOLLOW UP NOTE    Primary Care Provider: Judd Edwards PA-C  Referring Provider:    MRN: [id number]  : 1965    Reason for Encounter: follow up new diagnosis multiple myeloma       Oncology History Overview Note   2023 - BMBx - IgG kappa multiple myeloma with 50-60% kappa restricted plasma cells                - FISH - monosomy 13q14, gain 1q21, MYC rearrangement                - 54 XX     Multiple myeloma not having achieved remission ([address])   2023 Initial Diagnosis    Multiple myeloma not having achieved remission (HCC)         Interval History: Patient presents for follow up of her newly diagnosed multiple myeloma. This was done due to an IgG kappa M spike of 3.2 g/dL. She also has an additional spike of 0.69 g/dL of an IgG kappa. Creatinine is 0.52, calcium 8.9, hemoglobin 12.4. She had a bone survey that showed lucencies in the skull. Her other medical problems include GERD, hyperlipidemia, Banks's esophagus, asthma. She has significant asthma but Dupixent has been very helpful for her. She is also on Mounjaro for weight loss. Her bone marrow biopsy showed 50 to 60% kappa restricted plasma cells. See unk history for FISH results. She has some intermittent headaches and back pain that are not new for her. She also had a DVT after a fracture in the past.  She is not on any anticoagulation right now. Her free light chain ratio was 3.1. IgG is 4829. She is here today with her son who just finished a biochemistry major and is planning on moving on to pharmacy school. He has taken a leave of absence from school to care for her. 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          REVIEW OF SYSTEMS:  Please note that a 14-point review of systems was performed to include Constitutional, HEENT, Respiratory, CVS, GI, , Musculoskeletal, Integumentary, Neurologic, Rheumatologic, Endocrinologic, Psychiatric, Lymphatic, and Hematologic/Oncologic systems were reviewed and are negative unless otherwise stated in HPI. Positive and negative findings pertinent to this evaluation are incorporated into the history of present illness. ECOG PS: 0    PROBLEM LIST:  Patient Active Problem List   Diagnosis    Chronic pansinusitis    Severe frontal headaches    Severe persistent asthma without complication    Atopic rhinitis    Fatty infiltration of liver    Gastroesophageal reflux disease    Herpes simplex type 1 infection    Multiple-type hyperlipidemia    Migraine with aura    Long term (current) use of systemic steroids    Insomnia    Statin intolerance    Vitamin D deficiency    Class 2 severe obesity due to excess calories with serious comorbidity and body mass index (BMI) of 37.0 to 37.9 in adult     Type 2 diabetes mellitus without complication, without long-term current use of insulin (HCC)    Banks's esophagus with esophagitis    Abnormal CBC    Weight gain due to medication    Biclonal gammopathy    Acute deep vein thrombosis (DVT) of right lower extremity ([facility])    Age-related osteoporosis without current pathological fracture    Allergy to pineapple    Heat intolerance    Microalbuminuria    Diverticulosis    Hyponatremia    Frequent nosebleeds    Chronic fatigue    Extrinsic asthma without status asthmaticus    Canker sores oral    Constipation    Multiple myeloma not having achieved remission ([facility])       Assessment / Plan: 58-year-old female with a new diagnosis of IgG kappa multiple myeloma. She does meet criteria for active myeloma with 60% kappa restricted plasma cells in the bone marrow. The pathophysiology and natural history of multiple myeloma were reviewed. I am getting get a CT scan for myeloma to further define the extent of any bone disease that she may have. I explained that bone survey is not a very sensitive test and I would like to get a better picture of her bones. She does have 1 port feature genetically of a 1 q. 21 on FISH.   I reassured her that it is good that she does not have a 17 P deletion as these are much more aggressive myelomatous. I recommended an induction regimen of Vicenta CONRAD. We discussed the principles with high and induction regimens and then consolidation with autologous stem cell transplant. She would be healthy enough to go through this. I consented her for [facility] but the patient would like to get a second opinion at the [address] [place] [address] before starting induction treatment. This is reasonable because there are not any threatening things occurring here such as renal failure or hypercalcemia or very destructive lytic bone lesions. I think we do have time for her to get a second opinion. She would like to go to [facility] and we will assist in helping her set that up. She will need an opinion at a transplant center anyway. I also asked her to see a dentist in the meantime as it has been over a year since she has seen one. She will need a bone modifying agent as well. After we get the second opinion at [facility] I can start ordering treatment. It is very likely she will see conduction treatment locally. I spent 50 minutes on chart review, face to face counseling time, coordination of care and documentation. Past Medical History:   has a past medical history of Asthma, Banks's esophagus, GERD (gastroesophageal reflux disease), Hyperlipidemia, Migraine, and Nasal polyposis. PAST SURGICAL HISTORY:   has a past surgical history that includes Functional endoscopic sinus surgery (2006); pr strtctc cptr asstd px extradural cranial (Bilateral, 9/3/2019); and IR biopsy bone marrow (11/22/2023).     CURRENT MEDICATIONS  Current Outpatient Medications   Medication Sig Dispense Refill    ADVAIR DISKUS 500-50 MCG/DOSE inhaler Inhale 1 puff 2 (two) times a day   3    albuterol (2.5 mg/3 mL) 0.083 % nebulizer solution TK 3 ML BY NEBULIZATION BID PRN FOR WHEEZING  11    albuterol (ACCUNEB) 1.25 MG/3ML nebulizer solution Inhale      BREO ELLIPTA 200-25 MCG/INH inhaler Inhale 1 puff daily   6    DEXILANT 60 MG capsule Take 60 mg by mouth daily   5    dupilumab (DUPIXENT) subcutaneous injection Inject 300 mg under the skin once       EPINEPHrine (EPIPEN) 0.3 mg/0.3 mL SOAJ Inject 0.3 mL (0.3 mg total) into a muscle once for 1 dose 2 each 3    fexofenadine (ALLEGRA) 180 MG tablet Take 1 tablet (180 mg total) by mouth daily 90 tablet 3    fluticasone (FLONASE) 50 mcg/act nasal spray 1 spray into each nostril daily 16 g 11    ipratropium (ATROVENT) 0.02 % nebulizer solution USE 1 AMPULE VIA NEBULIZER 1-2 TIMES DAILY AS NEEDED      montelukast (SINGULAIR) 10 mg tablet Take 10 mg by mouth daily at bedtime       ondansetron (ZOFRAN) 4 mg tablet Take 1 tablet (4 mg total) by mouth every 8 (eight) hours as needed for nausea 20 tablet 0    SPIRIVA RESPIMAT 2.5 MCG/ACT AERS inhaler Inhale 2 puffs daily as needed   5    tirzepatide (Mounjaro) 7.5 MG/0.5ML Inject 0.5 mL (7.5 mg total) under the skin every 7 days 2 mL 0    VENTOLIN  (90 Base) MCG/ACT inhaler INL 2 PFS PO Q 4 H PRF WHZ  5    acyclovir (ZOVIRAX) 5 % ointment Apply to affected areas up to 6 times a day. (Patient not taking: Reported on 11/15/2023) 5 g 1    Blood Glucose Monitoring Suppl (OneTouch Verio Reflect) w/Device KIT CHECK BLOOD SUGARS ONCE DAILY. PLEASE SUBSTITUTE WITH APPROPRIATE ALTERNATIVE AS COVERED BY PATIENT'S INSURANCE. DX: E11.65 (Patient not taking: Reported on 12/12/2023) 1 kit 0    glucose blood (OneTouch Verio) test strip Check blood sugars once daily. Please substitute with appropriate alternative as covered by patient's insurance.  Dx: E11.65 (Patient not taking: Reported on 12/12/2023) 100 each 3    lisinopril (ZESTRIL) 2.5 mg tablet TAKE 1 TABLET BY MOUTH EVERY DAY (Patient not taking: Reported on 12/12/2023) 90 tablet 1    methylprednisolone (MEDROL) 4 mg tablet Take 0.5 tablets (2 mg total) by mouth daily with breakfast (Patient not taking: Reported on 12/12/2023)      OneTouch Delica Lancets 32H MISC Check blood sugars once daily. Please substitute with appropriate alternative as covered by patient's insurance. Dx: E11.65 (Patient not taking: Reported on 12/12/2023) 100 each 3     No current facility-administered medications for this visit. @Cloudmeter@    [facility] HISTORY:   reports that she has never smoked. She has never used smokeless tobacco. She reports current alcohol use of about 1.0 standard drink of alcohol per week. She reports that she does not use drugs. FAMILY HISTORY:  family history includes Crohn's disease in her daughter; Diabetes in her mother; Hypothyroidism in her mother; No Known Problems in her father, maternal aunt, maternal aunt, maternal aunt, maternal grandfather, maternal grandmother, paternal aunt, paternal aunt, paternal grandfather, paternal grandmother, sister, and sister. ALLERGIES:  is allergic to cephalexin, levofloxacin, statins, clindamycin, metronidazole, penicillins, and sulfa antibiotics. Physical Exam:  Vital Signs:   Visit Vitals  /84 (BP Location: Left arm, Patient Position: Sitting, Cuff Size: Adult)   Pulse 102   Temp 97.8 °F (36.6 °C)   Resp 17   Ht 5' 3" (1.6 m)   Wt 88.3 kg (194 lb 9.6 oz)   SpO2 99%   BMI 34.47 kg/m²   OB Status Postmenopausal   Smoking Status Never   BSA 1.91 m²     Body mass index is 34.47 kg/m². Body surface area is 1.91 meters squared. GEN: Alert, awake oriented x3, in no acute distress  HEENT- No pallor, icterus, cyanosis, no oral mucosal lesions,   LAD - no palpable cervical, clavicle, axillary, inguinal LAD  Heart- normal S1 S2, regular rate and rhythm, No murmur, rubs.    Lungs- clear breathing sound bilateral.   Abdomen- soft, Non tender, bowel sounds present  Extremities- No cyanosis, clubbing, edema  Neuro- No focal neurological deficit    Labs:  Lab Results   Component Value Date    WBC 4.68 11/22/2023    HGB 12.4 11/22/2023    HCT 37.1 11/22/2023    MCV 89 11/22/2023     11/22/2023     Lab Results   Component Value Date    SODIUM 134 (L) 09/15/2023    K 4.0 09/15/2023     09/15/2023    CO2 26 09/15/2023    AGAP 7 09/15/2023    BUN 12 09/15/2023    CREATININE 0.52 (L) 09/15/2023    GLUC 118 (H) 09/12/2023    GLUF 94 09/15/2023    CALCIUM 8.9 09/15/2023    AST 21 09/15/2023    ALT 28 09/15/2023    ALKPHOS 67 09/15/2023    TP 9.7 (H) 09/15/2023    TP 9.8 (H) 09/15/2023    TBILI 0.47 09/15/2023    EGFR 104 09/15/2023

## 2023-12-18 ENCOUNTER — TELEPHONE (OUTPATIENT)
Age: 58
End: 2023-12-18

## 2023-12-18 NOTE — TELEPHONE ENCOUNTER
Spoke with patient to answer her questions from last week. I apologized that I was unable to get a hold of her at the time of her questions. I informed the patient that Dr. Benitez will be able to accurately stage her myeloma once she has the CT scan done. I informed her that there is no contrast in this scan. Patient also given the number for [facility] to see if there have been any cancellations for Dr. Auguste. Patient stated she will call if she gets a sooner or different appointment.

## 2023-12-19 ENCOUNTER — OFFICE VISIT (OUTPATIENT)
Dept: BARIATRICS | Facility: CLINIC | Age: 58
End: 2023-12-19
Payer: COMMERCIAL

## 2023-12-19 VITALS
HEIGHT: 60 IN | WEIGHT: 190 LBS | SYSTOLIC BLOOD PRESSURE: 122 MMHG | RESPIRATION RATE: 16 BRPM | DIASTOLIC BLOOD PRESSURE: 82 MMHG | HEART RATE: 94 BPM | BODY MASS INDEX: 37.3 KG/M2

## 2023-12-19 DIAGNOSIS — E11.9 TYPE 2 DIABETES MELLITUS WITHOUT COMPLICATION, WITHOUT LONG-TERM CURRENT USE OF INSULIN (HCC): ICD-10-CM

## 2023-12-19 DIAGNOSIS — E66.9 OBESITY, CLASS II, BMI 35-39.9: Primary | ICD-10-CM

## 2023-12-19 DIAGNOSIS — K59.03 DRUG-INDUCED CONSTIPATION: ICD-10-CM

## 2023-12-19 PROCEDURE — 99214 OFFICE O/P EST MOD 30 MIN: CPT | Performed by: INTERNAL MEDICINE

## 2023-12-19 RX ORDER — DOCUSATE SODIUM 100 MG/1
100 CAPSULE, LIQUID FILLED ORAL DAILY
Qty: 90 CAPSULE | Refills: 0 | Status: SHIPPED | OUTPATIENT
Start: 2023-12-19

## 2023-12-19 NOTE — PATIENT INSTRUCTIONS
Once you finish the current supply of mounjaro 7.5mg weekly, then increase to mounjaro 10mg weekly.    2.   May take colace 100mg daily to help with constipation.  Contact me if this remains and issue after one month of being on the 10mg dosage.      3.  Continue with regular walks.    4.  Continue with the improved diet.    5.  Have BMP and HgA1C done.    6. Notify me with an update after you get a second opinion.  If the oncologist is recommending chemotherapy that may have decreased appetite or GI side effects we may wish to stop mounjaro.  Update me regardless.      7. Follow-up in 4 months.

## 2023-12-19 NOTE — PROGRESS NOTES
Assessment/Plan:  Amada was seen today for follow-up.    Diagnoses and all orders for this visit:    Obesity, Class II, BMI 35-39.9  -     Basic metabolic panel; Future  -     HEMOGLOBIN A1C W/ EAG ESTIMATION; Future    Type 2 diabetes mellitus without complication, without long-term current use of insulin (HCC)  -     tirzepatide (Mounjaro) 10 MG/0.5ML; Inject 0.5 mL (10 mg total) under the skin every 7 days  -     Basic metabolic panel; Future  -     HEMOGLOBIN A1C W/ EAG ESTIMATION; Future    Drug-induced constipation  -     docusate sodium (COLACE) 100 mg capsule; Take 1 capsule (100 mg total) by mouth in the morning      Patient tells me that she was recently diagnosed with multiple myeloma is in the process of getting a second opinion from [facility].  She is asymptomatic and overall feels great with the weight loss.  She wishes to remain on Mounjaro at this time which has been controlling her hemoglobin A1c and weight.  I discussed with her that there is no known association between a GLP-1 receptor agonist/GIP receptor agonist and multiple myeloma.  The incidence of multiple myeloma is greatly increased in those that are overweight and obese though this does not imply causality improved outcomes with weight loss.  At this time we have decided to keep her on Mounjaro and to increase to 10 mg daily next month but we will not further escalate until she meets with me again in 4 months.  She will reach out to me when she gets a second opinion at [facility].  If there is talk of starting a chemotherapeutic agent which may cause decreased appetite or nausea she may need to stop Mounjaro at that time.  Patient is aware of this.  I encouraged a healthy diet high in vegetable intake, fruit intake, avoidance of processed foods, low in animal-based fat.  Encouraged her to continue with daily walks.  Follow-up in 4 months or sooner if needed.    Patient instructions:   Once you finish the current supply of mounjaro 7.5mg  weekly, then increase to mounjaro 10mg weekly.    2.   May take colace 100mg daily to help with constipation.  Contact me if this remains and issue after one month of being on the 10mg dosage.      3.  Continue with regular walks.    4.  Continue with the improved diet.    5.  Have BMP and HgA1C done.    6. Notify me with an update after you get a second opinion.  If the oncologist is recommending chemotherapy that may have decreased appetite or GI side effects we may wish to stop mounjaro.  Update me regardless.      7. Follow-up in 4 months.          Total time spent reviewing chart, interviewing patient, examining patient, discussing plan, answering all questions, and documentin min.       ______________________________________________________________________      Subjective:   Chief Complaint   Patient presents with    Follow-up     MWM 4m f/u; Waist-49in     Patient here to discuss weight associated problems and nutrition goals  HPI: Amada Okeefe  is a 58 y.o. female with history of type 2 diabetes, asthma, migraine headaches, GERD, fatty liver disease, and excess weight.  Weight loss plan:  Conservative Program.   Most recent notes and records were reviewed.  She establish care on 8/3/2023.  At that time she wished to explore bariatric surgery but after thinking about our conversation more and further researching GLP-1 receptor agonist on her own she read presented on 8/15/2023 wishing to be started on a GLP-1 receptor agonist or GLP-1 receptor-GIP agonist.  Given her history of type 2 diabetes she was started on Victoza (sever nausea and vomiting).  Mounjaro 2.5 mg daily.  She tolerated this well and was increased to 5 mg weekly followed by 7.5 mg weekly in 1 month increments.  Continues to tolerate this medication without any adverse reactions or side effects.    Wt Readings from Last 10 Encounters:   23 86.2 kg (190 lb)   23 88.3 kg (194 lb 9.6 oz)   23 92.1 kg (203 lb)    11/15/23 91.2 kg (201 lb)   11/06/23 92.1 kg (203 lb)   10/20/23 93.9 kg (207 lb)   10/18/23 93.2 kg (205 lb 6.4 oz)   09/14/23 95.3 kg (210 lb)   08/18/23 95.7 kg (211 lb)   08/16/23 95.7 kg (211 lb)     Initial weight: 209 (8/3/2023)  Last OV weight: 210 (8/15/2023)  Current weight: 190  Change in weight: -20 pounds (9.5%)    Elevated WBC count, Bmbx.  MM.  Followed by Heme/Onc and getting a second opinion in [place].    Review Of Systems:  Review of Systems   Constitutional:  Negative for activity change, appetite change, fatigue and fever.   Respiratory:  Negative for cough and shortness of breath.    Cardiovascular:  Negative for chest pain, palpitations and leg swelling.   Gastrointestinal:  Negative for abdominal pain, constipation, diarrhea, nausea and vomiting.   Endocrine: Negative for cold intolerance and heat intolerance.   Genitourinary:  Negative for difficulty urinating and dysuria.   Musculoskeletal:  Negative for arthralgias, back pain and gait problem.   Skin:  Negative for pallor and rash.   Neurological:  Negative for headaches.   Psychiatric/Behavioral:  Negative for dysphoric mood, sleep disturbance and suicidal ideas (or HI). The patient is not nervous/anxious.      Objective:  /82   Pulse 94   Resp 16   Ht 5' (1.524 m)   Wt 86.2 kg (190 lb)   BMI 37.11 kg/m²   Physical Exam  Vitals and nursing note reviewed.   Constitutional:       General: She is not in acute distress.     Appearance: Normal appearance. She is not ill-appearing or diaphoretic.   Eyes:      General: No scleral icterus.  Cardiovascular:      Rate and Rhythm: Normal rate and regular rhythm.      Pulses: Normal pulses.      Heart sounds: No murmur heard.  Pulmonary:      Effort: Pulmonary effort is normal. No respiratory distress.      Breath sounds: Normal breath sounds. No wheezing or rhonchi.   Musculoskeletal:      Cervical back: Neck supple.      Right lower leg: No edema.      Left lower leg: No edema.    Lymphadenopathy:      Cervical: No cervical adenopathy.   Skin:     Capillary Refill: Capillary refill takes less than 2 seconds.      Findings: No lesion or rash.   Neurological:      Mental Status: She is alert and oriented to person, place, and time.      Gait: Gait normal.   Psychiatric:         Mood and Affect: Mood normal.         Behavior: Behavior normal.       Labs and Imaging  Recent labs and imaging have been personally reviewed.  Lab Results   Component Value Date    WBC 4.68 11/22/2023    HGB 12.4 11/22/2023    HCT 37.1 11/22/2023    MCV 89 11/22/2023     11/22/2023     Lab Results   Component Value Date    SODIUM 134 (L) 09/15/2023    K 4.0 09/15/2023     09/15/2023    CO2 26 09/15/2023    AGAP 7 09/15/2023    BUN 12 09/15/2023    CREATININE 0.52 (L) 09/15/2023    GLUC 118 (H) 09/12/2023    GLUF 94 09/15/2023    CALCIUM 8.9 09/15/2023    AST 21 09/15/2023    ALT 28 09/15/2023    ALKPHOS 67 09/15/2023    TP 9.7 (H) 09/15/2023    TP 9.8 (H) 09/15/2023    TBILI 0.47 09/15/2023    EGFR 104 09/15/2023     Lab Results   Component Value Date    HGBA1C 6.6 (A) 08/16/2023     Lab Results   Component Value Date    UTB5LEABALAW 2.110 01/31/2022     Lab Results   Component Value Date    CHOLESTEROL 207 (H) 09/12/2023     Lab Results   Component Value Date    HDL 45 (L) 09/12/2023     Lab Results   Component Value Date    TRIG 230 (H) 09/12/2023     Lab Results   Component Value Date    LDLCALC 125 (H) 09/12/2023

## 2023-12-20 ENCOUNTER — TELEPHONE (OUTPATIENT)
Age: 58
End: 2023-12-20

## 2023-12-20 DIAGNOSIS — C90.00 MULTIPLE MYELOMA NOT HAVING ACHIEVED REMISSION (HCC): Primary | ICD-10-CM

## 2023-12-20 RX ORDER — LENALIDOMIDE 25 MG/1
25 CAPSULE ORAL DAILY
Qty: 14 CAPSULE | Refills: 0 | Status: SHIPPED | OUTPATIENT
Start: 2023-12-20

## 2023-12-20 NOTE — TELEPHONE ENCOUNTER
Spoke with patient and gave her the details for her appt at [place] on January 4th at 0800 with Rae Albright MD. Patient confirmed appt date/time/location.

## 2023-12-20 NOTE — TELEPHONE ENCOUNTER
Spoke with patient to see how she is doing and if she had any questions/concerns. Patient states that she is very concerned to see Dr. Aleman as she has not seen any reviews online and cannot find much information about her. I informed the patient that [facility] would make sure that the physician is qualified prior to hiring them, but I understand her concern and will work to make her another appointment at [place] with a different physician. Patient was thankful for this. I called [place] and got the patient an appointment with Rae Albright MD on Jan 4th at 0800. I attempted to call the patient to inform her of this, but left her a VM for her to call me back to discuss the appointment details.

## 2023-12-21 ENCOUNTER — TELEPHONE (OUTPATIENT)
Dept: HEMATOLOGY ONCOLOGY | Facility: CLINIC | Age: 58
End: 2023-12-21

## 2023-12-22 ENCOUNTER — HOSPITAL ENCOUNTER (OUTPATIENT)
Dept: CT IMAGING | Facility: HOSPITAL | Age: 58
Discharge: HOME | End: 2023-12-22
Attending: INTERNAL MEDICINE
Payer: COMMERCIAL

## 2023-12-22 DIAGNOSIS — C90.00 MULTIPLE MYELOMA NOT HAVING ACHIEVED REMISSION (HCC): ICD-10-CM

## 2023-12-22 PROCEDURE — 76497 UNLISTED CT PROCEDURE: CPT

## 2024-01-03 ENCOUNTER — TELEPHONE (OUTPATIENT)
Age: 59
End: 2024-01-03

## 2024-01-05 ENCOUNTER — TELEPHONE (OUTPATIENT)
Dept: HEMATOLOGY ONCOLOGY | Facility: CLINIC | Age: 59
End: 2024-01-05

## 2024-01-10 ENCOUNTER — TELEPHONE (OUTPATIENT)
Age: 59
End: 2024-01-10

## 2024-01-10 NOTE — TELEPHONE ENCOUNTER
"Rec'd call from patient \"Hi Magdalena, this is Haley. I'm sorry too. I keep missing your call, but I'm going to try to watch where I just parked it down on my phone. When you get a chance, I know you're crazy busy, but when you get a chance, please give me a call back. I really appreciate it. I just want to talk to you a little bit about my treatment. Thanks so much, [phone]. Thank you.\"    Returned call and left  for patient to call me back at my direct teams number.  "

## 2024-01-12 DIAGNOSIS — R80.9 MICROALBUMINURIA: ICD-10-CM

## 2024-01-12 RX ORDER — LISINOPRIL 2.5 MG/1
TABLET ORAL
Qty: 90 TABLET | Refills: 1 | Status: SHIPPED | OUTPATIENT
Start: 2024-01-12

## 2024-01-25 ENCOUNTER — TELEPHONE (OUTPATIENT)
Dept: HEMATOLOGY ONCOLOGY | Facility: CLINIC | Age: 59
End: 2024-01-25

## 2024-01-25 NOTE — TELEPHONE ENCOUNTER
Received a call from March the, treatment for multiple myeloma with [facility].  She had her third cycle of treatment on Tuesday and Wednesday, since Friday last week she has had a generalized rash, which is painful, itchy and very uncomfortable where she is not able to bend her arms.  Patient is taking Benadryl around-the-clock without relief.  Advised her to go to the ER for further evaluation since there is no relief with Benadryl.  Advised her to have the ER physician consult oncology when she gets there.  Patient voiced understanding.

## 2024-02-13 ENCOUNTER — TELEPHONE (OUTPATIENT)
Age: 59
End: 2024-02-13

## 2024-02-14 NOTE — TELEPHONE ENCOUNTER
Patient returning call with additional questions related to reaching out to  herself.    # [phone]

## 2024-02-19 ENCOUNTER — NURSE TRIAGE (OUTPATIENT)
Age: 59
End: 2024-02-19

## 2024-02-19 NOTE — TELEPHONE ENCOUNTER
Pt called in simply to inform that she has received the mounjaro from the pharmacy with no issues. Pt has no questions at this time.

## 2024-02-29 ENCOUNTER — NURSE TRIAGE (OUTPATIENT)
Dept: BARIATRICS | Facility: CLINIC | Age: 59
End: 2024-02-29

## 2024-02-29 DIAGNOSIS — E66.9 OBESITY, CLASS II, BMI 35-39.9: Primary | ICD-10-CM

## 2024-02-29 NOTE — TELEPHONE ENCOUNTER
Pt calling.  States she has been on Mounjaro 10 mg for the past 3 months and feels it is not working.  States she gained 2 lbs over the last 3 to 4 weeks and has not been eating differently.  Pt would like to know if the dose can be increased.   RN advised she will check with provider and call back.    Pt uses [facility] [address] [place]   [phone]    Reason for Disposition   Nursing judgment    Protocols used: Information Only Call - No Triage-ADULT-[place]

## 2024-03-03 ENCOUNTER — NURSE TRIAGE (OUTPATIENT)
Dept: OTHER | Facility: OTHER | Age: 59
End: 2024-03-03

## 2024-03-03 NOTE — TELEPHONE ENCOUNTER
"Spoke with Jon Phelps, he advised patient got to ED. Patient made aware and verbalized understanding. Placed on arrival board. Advised to call back with questions or concerns.      Reason for Disposition   [1] SEVERE pain (e.g., excruciating, unable to do any normal activities) AND [2] not improved 2 hours after pain medicine    Answer Assessment - Initial Assessment Questions  1. LOCATION: \"Which tooth is hurting?\"  (e.g., right-side/left-side, upper/lower, front/back)      Left upper front  2. ONSET: \"When did the toothache start?\"  (e.g., hours, days)       Started 2 weeks ago. Was seen by dentist on 2.19.24 and placed on ABX amoxicllin 500mg Three times a day for 7 days  3. SEVERITY: \"How bad is the toothache?\"  (Scale 1-10; mild, moderate or severe)    - MILD (1-3): doesn't interfere with chewing     - MODERATE (4-7): interferes with chewing, interferes with normal activities, awakens from sleep      - SEVERE (8-10): unable to eat, unable to do any normal activities, excruciating pain         Severe  4. SWELLING: \"Is there any visible swelling of your face?\"      Left side swelling to face  5. OTHER SYMPTOMS: \"Do you have any other symptoms?\" (e.g., fever)      Last night had chills, but did not check temp.      Still has pain and is asking if can get another ABX.  Taking Tylenol 500mg with Motrin 600mg every 5 to 6 hours for pain.    Protocols used: Toothache-ADULT-    "

## 2024-03-03 NOTE — TELEPHONE ENCOUNTER
"Regarding: tooth pain/ infection  ----- Message from Melanie Ruano sent at 3/3/2024 10:36 AM EST -----  \"I have blood cancer and I had a broken tooth. I was given an antibiotic for it and I feel that the infection on my tooth is getting worse. I feel that my face is swelling and my gums bottom and top.\"    "

## 2024-03-18 NOTE — TELEPHONE ENCOUNTER
Patient called stating since taking Tymlos she has been experiencing headaches and dizziness and wanted to know if she can be switched to something else  done

## 2024-04-03 DIAGNOSIS — E11.9 TYPE 2 DIABETES MELLITUS WITHOUT COMPLICATION, WITHOUT LONG-TERM CURRENT USE OF INSULIN (HCC): Primary | ICD-10-CM

## 2024-04-03 DIAGNOSIS — E66.9 OBESITY, CLASS II, BMI 35-39.9: ICD-10-CM

## 2024-04-03 NOTE — TELEPHONE ENCOUNTER
Reason for call:   [x] Refill   [] Prior Auth  [] Other:     Office:   [] PCP/Provider -   [x] Specialty/Provider - Weight management    Medication:   Tirzepatide (mounjaro) 12.5mg/0.5ml- inject 0.5ml under the skin every 7 days      Pharmacy: [facility]    Does the patient have enough for 3 days?   [] Yes   [x] No - Send as HP to POD

## 2024-04-08 ENCOUNTER — TELEPHONE (OUTPATIENT)
Dept: BARIATRICS | Facility: CLINIC | Age: 59
End: 2024-04-08

## 2024-04-08 ENCOUNTER — TELEPHONE (OUTPATIENT)
Age: 59
End: 2024-04-08

## 2024-04-08 DIAGNOSIS — E11.9 TYPE 2 DIABETES MELLITUS WITHOUT COMPLICATION, WITHOUT LONG-TERM CURRENT USE OF INSULIN (HCC): ICD-10-CM

## 2024-04-08 NOTE — TELEPHONE ENCOUNTER
Pt called stating has been trying to find Mounjaro 15mg and can't find it. Pt is wondering if can keep prev dose (12.5) since is the one she has found. Warm transferred to practice,

## 2024-04-08 NOTE — TELEPHONE ENCOUNTER
Pt can't find 15mg of Mounjaro and found the 12.5mg she asking if she can stay on the 12.5mg and send to [facility]

## 2024-04-08 NOTE — TELEPHONE ENCOUNTER
Patient calling in and asking for medication to be sent asap over to [facility] on [address] [address]

## 2024-04-26 ENCOUNTER — OFFICE VISIT (OUTPATIENT)
Dept: BARIATRICS | Facility: CLINIC | Age: 59
End: 2024-04-26
Payer: COMMERCIAL

## 2024-04-26 VITALS
DIASTOLIC BLOOD PRESSURE: 87 MMHG | RESPIRATION RATE: 17 BRPM | BODY MASS INDEX: 31.38 KG/M2 | SYSTOLIC BLOOD PRESSURE: 122 MMHG | WEIGHT: 166.2 LBS | HEIGHT: 61 IN | HEART RATE: 87 BPM | TEMPERATURE: 97.8 F

## 2024-04-26 DIAGNOSIS — E11.9 TYPE 2 DIABETES MELLITUS WITHOUT COMPLICATION, WITHOUT LONG-TERM CURRENT USE OF INSULIN (HCC): Primary | ICD-10-CM

## 2024-04-26 DIAGNOSIS — E66.9 OBESITY (BMI 30.0-34.9): ICD-10-CM

## 2024-04-26 PROCEDURE — 99214 OFFICE O/P EST MOD 30 MIN: CPT | Performed by: INTERNAL MEDICINE

## 2024-04-26 RX ORDER — DEXAMETHASONE 4 MG/1
40 TABLET ORAL
COMMUNITY
Start: 2024-03-21 | End: 2025-02-14

## 2024-04-26 RX ORDER — DOXYCYCLINE 100 MG/1
100 TABLET ORAL 2 TIMES DAILY
COMMUNITY
Start: 2024-04-18 | End: 2024-04-26

## 2024-04-26 RX ORDER — CLINDAMYCIN HYDROCHLORIDE 300 MG/1
CAPSULE ORAL
COMMUNITY
Start: 2024-03-03 | End: 2024-04-26

## 2024-04-26 RX ORDER — PREDNISONE 20 MG/1
TABLET ORAL
COMMUNITY
Start: 2024-01-30 | End: 2024-04-26

## 2024-04-26 RX ORDER — IBUPROFEN 600 MG/1
600 TABLET ORAL EVERY 8 HOURS PRN
COMMUNITY
Start: 2024-03-02

## 2024-04-26 RX ORDER — HYDROCODONE BITARTRATE AND ACETAMINOPHEN 5; 325 MG/1; MG/1
1 TABLET ORAL EVERY 4 HOURS PRN
COMMUNITY
Start: 2024-03-04 | End: 2024-04-26

## 2024-04-26 RX ORDER — TRIAMCINOLONE ACETONIDE 1 MG/G
1 CREAM TOPICAL 2 TIMES DAILY
COMMUNITY
Start: 2024-01-23

## 2024-04-26 RX ORDER — ERYTHROMYCIN 5 MG/G
OINTMENT OPHTHALMIC
COMMUNITY
Start: 2024-04-17

## 2024-04-26 RX ORDER — CHLORHEXIDINE GLUCONATE ORAL RINSE 1.2 MG/ML
SOLUTION DENTAL
COMMUNITY
Start: 2024-03-04

## 2024-04-26 RX ORDER — PROCHLORPERAZINE MALEATE 10 MG
10 TABLET ORAL EVERY 6 HOURS PRN
COMMUNITY
Start: 2024-03-21

## 2024-04-26 NOTE — PROGRESS NOTES
Assessment/Plan:  Amada was seen today for follow-up.    Diagnoses and all orders for this visit:    Type 2 diabetes mellitus without complication, without long-term current use of insulin (HCC)  -     Hemoglobin A1C; Future  -     tirzepatide (Mounjaro) 15 MG/0.5ML; Inject 0.5 mL (15 mg total) under the skin every 7 days    Obesity (BMI 30.0-34.9)    BMI 31.0-31.9,adult       Initial weight: 209 (8/3/2023)  Last OV weight: 190 (12/19/2023)  Current weight: 166  Change in weight: -43 pounds (-20.6%)    - Weight not at goal  - Patient is interested in Conservative Program  - Labs reviewed: As below.  -Provided with a list of pharmacies which tend to do a better job keeping supplies in stock when there are shortages.  Encouraged to look at mail-order options as well.    General Recommendations:  Nutrition:  Eat breakfast daily.  Do not skip meals.     Food log (ie.) www.TixAlert.com, sparkpeople.com, loseit.com, calorieking.com, etc.    Practice mindful eating.  Be sure to set aside time to eat, eat slowly, and savor your food.    Hydration:    At least 64oz of water daily.  No sugar sweetened beverages.  No juice (eat the fruit instead).    Exercise:  Studies have shown that the ideal exercise goal is somewhere between 150 to 300 minutes of moderate intensity exercise a week.  Start with exercising 10 minutes every other day and gradually increase physical activity with a goal of at least 150 minutes of moderate intensity exercise a week, divided over at least 3 days a week.  An example of this would be exercising 30 minutes a day, 5 days a week.  Resistance training can increase muscle mass and increase our resting metabolic rate.   FULL BODY resistance training is recommended 2-3 times a week.  Do not do this on consecutive days to allow for muscle recovery.    Aim for a bare minimum 5000 steps, even on days you do not exercise.    Monitoring:   Weigh yourself daily.  If this causes undue stress, then just  weigh yourself once a week.  Weigh yourself the same time of the day with the same amount of clothing on.  Preferably this should be done after waking up, before you eat, and with no clothing or minimal clothing on.    Specific Goals:  Food log (ie.) www.Interventional Spinenesspal.com,Adynxx.com,Spreadtrum Communicationsit.com,8villages.com,etc.     No sugary beverages. At least 64oz of water daily.    Increase physical activity by 10 minutes daily    May continue with mounjaro 15mg weekly, unless told otherwise by the specialists managing multiple myeloma.    Return visit:  3 months        Total time spent reviewing chart, interviewing patient, examining patient, discussing plan, answering all questions, and documentin min.       ______________________________________________________________________        Subjective:   Chief Complaint   Patient presents with    Follow-up     MWM-4M F/u     Patient here to discuss weight associated problems and nutrition goals  HPI: Amada Okeefe  is a 58 y.o. female with history of type 2 diabetes, asthma, migraine headaches, GERD, fatty liver disease, and multiple myeloma excess weight.  Weight loss plan:  Conservative Program.   Most recent notes and records were reviewed.    Wt Readings from Last 10 Encounters:   24 75.4 kg (166 lb 3.2 oz)   23 86.2 kg (190 lb)   23 88.3 kg (194 lb 9.6 oz)   23 92.1 kg (203 lb)   11/15/23 91.2 kg (201 lb)   23 92.1 kg (203 lb)   10/20/23 93.9 kg (207 lb)   10/18/23 93.2 kg (205 lb 6.4 oz)   23 95.3 kg (210 lb)   23 95.7 kg (211 lb)     Initial weight: 209 (8/3/2023)  Last OV weight: 190 (2023)  Current weight: 166  Change in weight: -43 pounds (-20.6%)  Discussed goal weight with hematologist who was fine with her goal of 125 lbs.    Hunger/Cravings:  Reduced  Hydration:  Water 64oz+  Occasional diet soda  Alcohol:  No  Exercise:  Walks 10 minutes a day  Sleep:  6-7 hours but disrupted.  Thinks it may be due to  "sleep.  Weight Monitoring:  No    Patient has been going to [place] and then [facility] for weekly chemotherapy treatments for MM.  Completed 3 cycles.  Bone marrow bx done. yesterday.  Patient reports no GI SE to chemo or to mounjaro.  Her oncologist were fine with her continuing mounjaro and continuing her efforts for lose weight.  She will be undergoing a bone marrow transplant next month.  She tells me that overall she feels great and is grateful that she was able to lose a significant amount of weight prior to going through this as she feels she would have lacked the mental and physical stamina to go through this process otherwise.    She is nervous about the supply chain issues with mounjaro and asks advice on this.      Review Of Systems:  Review of Systems   Constitutional:  Negative for activity change, appetite change, fatigue and fever.   Respiratory:  Negative for cough and shortness of breath.    Cardiovascular:  Negative for chest pain, palpitations and leg swelling.   Gastrointestinal:  Negative for abdominal pain, constipation, diarrhea, nausea and vomiting.   Endocrine: Negative for cold intolerance and heat intolerance.   Genitourinary:  Negative for difficulty urinating and dysuria.   Musculoskeletal:  Negative for arthralgias, back pain and gait problem.   Skin:  Negative for pallor and rash.   Neurological:  Negative for headaches.   Psychiatric/Behavioral:  Negative for dysphoric mood, sleep disturbance and suicidal ideas (or HI). The patient is not nervous/anxious.        Objective:  /87   Pulse 87   Temp 97.8 °F (36.6 °C)   Resp 17   Ht 5' 1\" (1.549 m)   Wt 75.4 kg (166 lb 3.2 oz)   BMI 31.40 kg/m²   Physical Exam  Vitals and nursing note reviewed.   Constitutional:       General: She is not in acute distress.     Appearance: Normal appearance. She is not ill-appearing or diaphoretic.   Eyes:      General: No scleral icterus.  Cardiovascular:      Rate and Rhythm: Normal rate and regular " rhythm.      Pulses: Normal pulses.      Heart sounds: No murmur heard.  Pulmonary:      Effort: Pulmonary effort is normal. No respiratory distress.      Breath sounds: Normal breath sounds. No wheezing or rhonchi.   Abdominal:      General: Bowel sounds are normal. There is no distension.      Palpations: Abdomen is soft. There is no mass.      Tenderness: There is no abdominal tenderness.   Musculoskeletal:      Cervical back: Neck supple.      Right lower leg: No edema.      Left lower leg: No edema.   Lymphadenopathy:      Cervical: No cervical adenopathy.   Skin:     Capillary Refill: Capillary refill takes less than 2 seconds.      Findings: No lesion or rash.   Neurological:      Mental Status: She is alert and oriented to person, place, and time.      Gait: Gait normal.   Psychiatric:         Mood and Affect: Mood normal.         Behavior: Behavior normal.         Labs and Imaging  Recent labs and imaging have been personally reviewed.  Lab Results   Component Value Date    WBC 4.68 11/22/2023    HGB 12.4 11/22/2023    HCT 37.1 11/22/2023    MCV 89 11/22/2023     11/22/2023     Lab Results   Component Value Date    SODIUM 141 04/05/2024    K 4.1 04/05/2024     04/05/2024    CO2 28 04/05/2024    AGAP 9 04/05/2024    BUN 16 04/05/2024    CREATININE 0.45 04/05/2024    GLUC 86 04/05/2024    GLUF 94 09/15/2023    CALCIUM 9.8 04/05/2024    AST 14 04/05/2024    ALT 14 04/05/2024    ALKPHOS 62 04/05/2024    TP 6.6 04/05/2024    TBILI 0.8 04/05/2024    EGFR 110 04/05/2024     Lab Results   Component Value Date    HGBA1C 6.6 (A) 08/16/2023     Lab Results   Component Value Date    HMQ7VGTRLCXC 2.110 01/31/2022    TSH 1.19 07/23/2021     Lab Results   Component Value Date    CHOLESTEROL 207 (H) 09/12/2023     Lab Results   Component Value Date    HDL 45 (L) 09/12/2023     Lab Results   Component Value Date    TRIG 230 (H) 09/12/2023     Lab Results   Component Value Date    LDLCALC 125 (H) 09/12/2023         COMPREHENSIVE METABOLIC PANEL  Status: Final result         suggestion  Result Information displayed in this report will not trend and may not trigger automated decision support.      Contains abnormal data COMPREHENSIVE METABOLIC PANEL  Order: [id number]  Component  Ref Range & Units 4/25/24  8:50 AM   Glucose  65 - 99 mg/dL 90   Comment:        American Diabetes Association Reference Ranges:        -------------------------------------------------         Normal Adult:             65-99 mg/dL         Impaired Fasting Glucose: 100-125 mg/dL         Diabetes:                 >126 mg/dL         Recommend repeat testing for confirmatory diagnosis of diabetes.   BUN  8 - 20 mg/dL 12   Creatinine  0.60 - 1.10 mg/dL 0.42 Low    eGFR  >60 mL/min/1.73m2 112   Sodium  136 - 145 mmol/L 137   Potassium  3.5 - 5.2 mmol/L 3.2 Low    Chloride  101 - 111 mmol/L 101   Carbon Dioxide  22 - 32 mmol/L 24   Calcium  8.6 - 10.0 mg/dL 9.2   ALBUMIN (SERUM)  3.5 - 5.0 g/dL 4.3   Protein, Total  6.0 - 8.0 g/dL 7.1   Total Bilirubin  0.2 - 1.2 mg/dL 1.1   Alkaline Phosphatase  35 - 125 U/L 65   ALANINE AMINOTRANSFERASE  0 - 33 U/L 19   ASPAR AMINOTRANSFERASE  0 - 30 U/L 16   ANION GAP  6 - 16 mmol/L 12   Osmolality, Calculation  mOsm/kg 283   Comment:        Reference Ranges:        -------------------         271-296 [0-16 years]         280-303 [17 years and older]      Osmolality Calculated based on the formula below:         -------------------        2NA + (GLU/18) + (BUN/2.8)   BUN/Creatinine Ratio  10 - 20 Ratio 29 High      Hemoglobin A1c 8 months ago was 6.6%, down from 6.7%.  Due for repeat.

## 2024-04-26 NOTE — PATIENT INSTRUCTIONS
Initial weight: 209 (8/3/2023)  Last OV weight: 190 (12/19/2023)  Current weight: 166  Change in weight: -43 pounds (-20.6%)    - Weight not at goal  - Patient is interested in Conservative Program  - Labs reviewed: As below.  -Provided with a list of pharmacies which tend to do a better job keeping supplies in stock when there are shortages.  Encouraged to look at mail-order options as well.    General Recommendations:  Nutrition:  Eat breakfast daily.  Do not skip meals.     Food log (ie.) www.myfitnesspal.com, sparkpeople.com, loseit.com, calorieking.com, etc.    Practice mindful eating.  Be sure to set aside time to eat, eat slowly, and savor your food.    Hydration:    At least 64oz of water daily.  No sugar sweetened beverages.  No juice (eat the fruit instead).    Exercise:  Studies have shown that the ideal exercise goal is somewhere between 150 to 300 minutes of moderate intensity exercise a week.  Start with exercising 10 minutes every other day and gradually increase physical activity with a goal of at least 150 minutes of moderate intensity exercise a week, divided over at least 3 days a week.  An example of this would be exercising 30 minutes a day, 5 days a week.  Resistance training can increase muscle mass and increase our resting metabolic rate.   FULL BODY resistance training is recommended 2-3 times a week.  Do not do this on consecutive days to allow for muscle recovery.    Aim for a bare minimum 5000 steps, even on days you do not exercise.    Monitoring:   Weigh yourself daily.  If this causes undue stress, then just weigh yourself once a week.  Weigh yourself the same time of the day with the same amount of clothing on.  Preferably this should be done after waking up, before you eat, and with no clothing or minimal clothing on.    Specific Goals:  Food log (ie.) www.myfitnesspal.com,sparkpeople.com,loseit.com,calorieking.com,etc.     No sugary beverages. At least 64oz of water  daily.    Increase physical activity by 10 minutes daily    May continue with mounjaro 15mg weekly, unless told otherwise by the specialists managing multiple myeloma.    Return visit:  3 months

## 2024-06-04 NOTE — TELEPHONE ENCOUNTER
LVM for patient regarding scheduling a FU appt in one of our offices. At this time I was unable to schedule the patient.

## 2024-07-11 RX ORDER — ALBUTEROL SULFATE 90 UG/1
2 AEROSOL, METERED RESPIRATORY (INHALATION) EVERY 4 HOURS PRN
Qty: 36 G | OUTPATIENT
Start: 2024-07-11

## 2024-07-12 ENCOUNTER — OFFICE VISIT (OUTPATIENT)
Dept: BARIATRICS | Facility: CLINIC | Age: 59
End: 2024-07-12
Payer: COMMERCIAL

## 2024-07-12 VITALS
HEIGHT: 61 IN | TEMPERATURE: 97.5 F | RESPIRATION RATE: 17 BRPM | WEIGHT: 156 LBS | BODY MASS INDEX: 29.45 KG/M2 | DIASTOLIC BLOOD PRESSURE: 80 MMHG | HEART RATE: 100 BPM | SYSTOLIC BLOOD PRESSURE: 100 MMHG

## 2024-07-12 DIAGNOSIS — K22.70 BARRETT'S ESOPHAGUS WITH ESOPHAGITIS: ICD-10-CM

## 2024-07-12 DIAGNOSIS — E11.9 TYPE 2 DIABETES MELLITUS WITHOUT COMPLICATION, WITHOUT LONG-TERM CURRENT USE OF INSULIN (HCC): ICD-10-CM

## 2024-07-12 DIAGNOSIS — E66.3 OVERWEIGHT: Primary | ICD-10-CM

## 2024-07-12 DIAGNOSIS — K20.90 BARRETT'S ESOPHAGUS WITH ESOPHAGITIS: ICD-10-CM

## 2024-07-12 PROCEDURE — 99214 OFFICE O/P EST MOD 30 MIN: CPT | Performed by: INTERNAL MEDICINE

## 2024-07-12 NOTE — PATIENT INSTRUCTIONS
May restart mounjaro at a reduced dose of 5mg weekly.  Side effects of Mounjaro include nausea, vomiting, diarrhea, or constipation. Keep an eye on your heart rate while on Mounjaro. If you resting heart rate is greater than 100 beats per minutes, please notify me. If you develop severe abdominal pain, stop Mounjaro and go to the emergency room, as that could be a sign of pancreatitis.   Make sure you are drinking at least 64oz of water a day.  Daily walks.  Contact me with an update in one month.  If you are doing fine we will start to increase Mounjaro again by going up to the 7.5mg dosage next month.  5.  Weight check in 2 months.    6.  Follow-up on 4 months.

## 2024-07-12 NOTE — PROGRESS NOTES
Assessment/Plan:  Amada was seen today for follow-up.    Diagnoses and all orders for this visit:    Overweight    Type 2 diabetes mellitus without complication, without long-term current use of insulin (HCC)  -     tirzepatide (Mounjaro) 5 MG/0.5ML; Inject 0.5 mL (5 mg total) under the skin every 7 days    Banks's esophagus with esophagitis    May restart mounjaro at a reduced dose of 5mg weekly.  Side effects of Mounjaro include nausea, vomiting, diarrhea, or constipation. Keep an eye on your heart rate while on Mounjaro. If you resting heart rate is greater than 100 beats per minutes, please notify me. If you develop severe abdominal pain, stop Mounjaro and go to the emergency room, as that could be a sign of pancreatitis.   Make sure you are drinking at least 64oz of water a day.  Daily walks.  Contact me with an update in one month.  If you are doing fine we will start to increase Mounjaro again by going up to the 7.5mg dosage next month.  5.  Weight check in 2 months.    6.  Follow-up on 4 months.    Total time spent reviewing chart, interviewing patient, examining patient, discussing plan, answering all questions, and documentin min.       ______________________________________________________________________    Subjective:   Chief Complaint   Patient presents with    Follow-up     MWM-3M F/u;Waist-40IN     Patient here to discuss weight associated problems and nutrition goals  HPI: Amada Okeefe  is a 58 y.o. female with history of type 2 diabetes, asthma, migraine headaches, GERD, fatty liver disease, multiple myeloma status post stem cell transplant, and excess weight.  Posttransplant course complicated by neutropenic fever, E. coli UTI, polymicrobial bacteremia, candiduria, CINV, and diarrhe.  Port removed.  Started on IV abx by ID.  Improved and discharged in stable condition on 2024 and to continue IV antibiotics for an additional 14 days.  She has completed this course.     Wt Readings  "from Last 10 Encounters:   07/12/24 70.8 kg (156 lb)   04/26/24 75.4 kg (166 lb 3.2 oz)   12/19/23 86.2 kg (190 lb)   12/12/23 88.3 kg (194 lb 9.6 oz)   11/22/23 92.1 kg (203 lb)   11/15/23 91.2 kg (201 lb)   11/06/23 92.1 kg (203 lb)   10/20/23 93.9 kg (207 lb)   10/18/23 93.2 kg (205 lb 6.4 oz)   09/14/23 95.3 kg (210 lb)     Initial weight: 209 (8/3/2023)  Last OV weight: 166 (12/19/2023)  Current weight: 156  Change in weight: -53 pounds (-25.4%)    Decreased appetite and nausea now resolve since acute illness.  Mounjaro help for 2 weeks.      Patient denies personal and family history of pancreatitis, thyroid cancer, MEN-2 tumors.      The following portions of the patient's history were reviewed and updated as appropriate: allergies, current medications, past family history, past medical history, past social history, past surgical history, and problem list.    Review Of Systems:  Review of Systems   Constitutional:  Negative for activity change, appetite change, fatigue and fever.   Respiratory:  Negative for cough and shortness of breath.    Cardiovascular:  Negative for chest pain, palpitations and leg swelling.   Gastrointestinal:  Negative for abdominal pain, constipation, diarrhea, nausea and vomiting.   Endocrine: Negative for cold intolerance and heat intolerance.   Genitourinary:  Negative for difficulty urinating and dysuria.   Musculoskeletal:  Negative for arthralgias, back pain and gait problem.   Skin:  Negative for pallor and rash.   Neurological:  Negative for headaches.   Psychiatric/Behavioral:  Negative for dysphoric mood, sleep disturbance and suicidal ideas (or HI). The patient is not nervous/anxious.        Objective:  /80   Pulse 100   Temp 97.5 °F (36.4 °C)   Resp 17   Ht 5' 1\" (1.549 m)   Wt 70.8 kg (156 lb)   BMI 29.48 kg/m²   Physical Exam  Vitals and nursing note reviewed.   Constitutional:       General: She is not in acute distress.     Appearance: Normal appearance. " She is not ill-appearing or diaphoretic.   Eyes:      General: No scleral icterus.  Cardiovascular:      Rate and Rhythm: Normal rate and regular rhythm.      Pulses: Normal pulses.      Heart sounds: No murmur heard.  Pulmonary:      Effort: Pulmonary effort is normal. No respiratory distress.      Breath sounds: Normal breath sounds. No wheezing or rhonchi.   Musculoskeletal:      Cervical back: Neck supple.      Right lower leg: No edema.      Left lower leg: No edema.   Lymphadenopathy:      Cervical: No cervical adenopathy.   Skin:     Capillary Refill: Capillary refill takes less than 2 seconds.      Findings: No lesion or rash.   Neurological:      Mental Status: She is alert and oriented to person, place, and time.      Gait: Gait normal.   Psychiatric:         Mood and Affect: Mood normal.         Behavior: Behavior normal.         Labs and Imaging  Recent labs and imaging have been personally reviewed.  Lab Results   Component Value Date    WBC 4.68 11/22/2023    HGB 12.4 11/22/2023    HCT 37.1 11/22/2023    MCV 89 11/22/2023     11/22/2023     Lab Results   Component Value Date    SODIUM 141 04/05/2024    K 4.1 04/05/2024     04/05/2024    CO2 28 04/05/2024    AGAP 9 04/05/2024    BUN 16 04/05/2024    CREATININE 0.45 04/05/2024    GLUC 86 04/05/2024    GLUF 94 09/15/2023    CALCIUM 9.8 04/05/2024    AST 14 04/05/2024    ALT 14 04/05/2024    ALKPHOS 62 04/05/2024    TP 6.6 04/05/2024    TBILI 0.8 04/05/2024    EGFR 110 04/05/2024     Lab Results   Component Value Date    HGBA1C 5.5 05/31/2024     Lab Results   Component Value Date    PAN1VVJODCNF 2.110 01/31/2022    TSH 1.19 07/23/2021     Lab Results   Component Value Date    CHOLESTEROL 207 (H) 09/12/2023     Lab Results   Component Value Date    HDL 45 (L) 09/12/2023     Lab Results   Component Value Date    TRIG 230 (H) 09/12/2023     Lab Results   Component Value Date    LDLCALC 125 (H) 09/12/2023      HEMOGLOBIN A1C  Order: [id number]    Status: Final result       Next appt: None              Component  Ref Range & Units 5/31/24  2:58 AM 4/7/23  4:10 PM 8/9/22 12:43 PM 1/31/22 11:10 AM 7/23/21 12:56 PM 4/23/20 12:34 PM 9/27/19  5:56 PM   Hgb A1c  4.7 - 6.4 % 5.5         eAG, EST AVG Glucose  mg/dL 111 146 R 140 R 148 R 151 R 146 R 140 R

## 2024-07-17 ENCOUNTER — TELEPHONE (OUTPATIENT)
Age: 59
End: 2024-07-17

## 2024-07-17 NOTE — TELEPHONE ENCOUNTER
" patient. Was seen in the office on 7/14/24. Was started on Mounjaro 0.5 ml. Prescription was sent to [facility]. Unavailable there. Given several numbers for the [facility]. When she finds availability she will call back so prescription can be sent to that pharmacy.  "

## 2024-07-26 ENCOUNTER — TELEPHONE (OUTPATIENT)
Age: 59
End: 2024-07-26

## 2024-07-26 DIAGNOSIS — E11.9 TYPE 2 DIABETES MELLITUS WITHOUT COMPLICATION, WITHOUT LONG-TERM CURRENT USE OF INSULIN (HCC): Primary | ICD-10-CM

## 2024-07-26 NOTE — TELEPHONE ENCOUNTER
Pt called in and stated took the 3rd shot of Mounjaro 5 mg, is doing good with current dose, and is ready for 7.5 mg. Pharmacy: [facility]

## 2024-08-13 ENCOUNTER — NURSE TRIAGE (OUTPATIENT)
Age: 59
End: 2024-08-13

## 2024-08-13 NOTE — TELEPHONE ENCOUNTER
"Pt called in requesting next dose of Mounjaro. Pt currently on 7.5mg. Pt denies any adverse side effects. Reports losing approx 5-6lbs. Please send script to [facility] [address] [facility].    Reason for Disposition   Nursing judgment    Answer Assessment - Initial Assessment Questions  1. REASON FOR CALL or QUESTION: \"What is your reason for calling today?\" or \"How can I best help you?\" or \"What question do you have that I can help answer?\"      Requesting next dose of Mounjaro    Protocols used: Information Only Call - No Triage-ADULT-[place]    "

## 2024-08-14 DIAGNOSIS — E11.9 TYPE 2 DIABETES MELLITUS WITHOUT COMPLICATION, WITHOUT LONG-TERM CURRENT USE OF INSULIN (HCC): ICD-10-CM

## 2024-08-27 ENCOUNTER — TELEPHONE (OUTPATIENT)
Age: 59
End: 2024-08-27

## 2024-08-27 NOTE — TELEPHONE ENCOUNTER
Pt called in stating is doing good with  Mounjaro 10mg. Pt stated is ready for the next dose (15mg). Pt requested the refill for the next dose.

## 2024-08-29 DIAGNOSIS — E11.9 TYPE 2 DIABETES MELLITUS WITHOUT COMPLICATION, WITHOUT LONG-TERM CURRENT USE OF INSULIN (HCC): Primary | ICD-10-CM

## 2024-09-26 DIAGNOSIS — E11.9 TYPE 2 DIABETES MELLITUS WITHOUT COMPLICATION, WITHOUT LONG-TERM CURRENT USE OF INSULIN (HCC): ICD-10-CM

## 2024-09-26 RX ORDER — TIRZEPATIDE 12.5 MG/.5ML
INJECTION, SOLUTION SUBCUTANEOUS
Qty: 2 ML | Refills: 1 | Status: SHIPPED | OUTPATIENT
Start: 2024-09-26

## 2024-09-30 ENCOUNTER — TELEPHONE (OUTPATIENT)
Age: 59
End: 2024-09-30

## 2024-09-30 NOTE — TELEPHONE ENCOUNTER
Patient of Sg Barron PA-C. Prescription for Mounjaro 12.5 mg sent to her pharmacy on 9/26/24. Patient is requesting to step up to 15 mg. States she has had not side effects on symptoms from previous dose. Please advise.

## 2024-09-30 NOTE — TELEPHONE ENCOUNTER
Last seen by Dr. VALLECILLO in July. Would suggest moving up her follow up visit sooner if she would like an increase and we can determine if we should increase dose or continue same dosing.

## 2024-10-01 NOTE — TELEPHONE ENCOUNTER
Genoveva for pt to call back and r/s her January appt to a sooner appt w/Alaina Lee to discuss med increase

## 2024-10-04 NOTE — TELEPHONE ENCOUNTER
Patient called in because she missed her appt this morning with Doreen Sutton, I rescheduled her for January (soonest available) but if she needs to be seen sooner, please give her a call.  Thanks.

## 2024-10-15 ENCOUNTER — OFFICE VISIT (OUTPATIENT)
Dept: BARIATRICS | Facility: CLINIC | Age: 59
End: 2024-10-15

## 2024-10-15 ENCOUNTER — TELEPHONE (OUTPATIENT)
Dept: BARIATRICS | Facility: CLINIC | Age: 59
End: 2024-10-15

## 2024-10-15 VITALS
WEIGHT: 145 LBS | HEIGHT: 61 IN | RESPIRATION RATE: 18 BRPM | HEART RATE: 80 BPM | TEMPERATURE: 97 F | SYSTOLIC BLOOD PRESSURE: 104 MMHG | BODY MASS INDEX: 27.38 KG/M2 | DIASTOLIC BLOOD PRESSURE: 72 MMHG

## 2024-10-15 DIAGNOSIS — E66.812 CLASS 2 SEVERE OBESITY DUE TO EXCESS CALORIES WITH SERIOUS COMORBIDITY AND BODY MASS INDEX (BMI) OF 37.0 TO 37.9 IN ADULT (HCC): ICD-10-CM

## 2024-10-15 DIAGNOSIS — E11.9 TYPE 2 DIABETES MELLITUS WITHOUT COMPLICATION, WITHOUT LONG-TERM CURRENT USE OF INSULIN (HCC): Primary | ICD-10-CM

## 2024-10-15 DIAGNOSIS — E66.01 CLASS 2 SEVERE OBESITY DUE TO EXCESS CALORIES WITH SERIOUS COMORBIDITY AND BODY MASS INDEX (BMI) OF 37.0 TO 37.9 IN ADULT (HCC): ICD-10-CM

## 2024-10-15 NOTE — ASSESSMENT & PLAN NOTE
Continue Lifestyle modification  She is taking Mounajaro for Dx of Diabetes and has lost ~50 pounds  Will increase dose to 15mg weekly as requested- she has tolerated this dose in the past without side effects.   Encouraged her to focus on increasing exercise-- work up to 150 minutes per week including strength training 2x per week.

## 2024-10-15 NOTE — ASSESSMENT & PLAN NOTE
Diabetes is under good control on Mounjaro which will be increase to 15mg today.   Lab Results   Component Value Date    HGBA1C 5.5 05/31/2024

## 2024-10-15 NOTE — PROGRESS NOTES
Assessment/Plan:    Problem List Items Addressed This Visit          Endocrine    Type 2 diabetes mellitus without complication, without long-term current use of insulin ([facility]) - Primary     Diabetes is under good control on Mounjaro which will be increase to 15mg today.   Lab Results   Component Value Date    HGBA1C 5.5 05/31/2024            Relevant Medications    tirzepatide (Mounjaro) 15 MG/0.5ML       Other    Class 2 severe obesity due to excess calories with serious comorbidity and body mass index (BMI) of 37.0 to 37.9 in adult (HCC)     Continue Lifestyle modification  She is taking Mounajaro for Dx of Diabetes and has lost ~50 pounds  Will increase dose to 15mg weekly as requested- she has tolerated this dose in the past without side effects.   Encouraged her to focus on increasing exercise-- work up to 150 minutes per week including strength training 2x per week.                  Subjective:   Chief Complaint   Patient presents with    Follow-up     MWM 6M F/u; Waist 36in       Patient ID: Amada Okeefe  is a 59 y.o. female with excess weight/obesity here for Weight Management Follow up Visit    HPI: Here for Medical Weight Management Follow Up Visit    Obesity/Excess Weight:  Current BMI: Body mass index is 27.4 kg/m².     Initial Weight: 209 lbs on 8/3/2023  Last visit Weight: 156 on 7/12/2024   Current Weight: 145    Current Weight Management Plan:  Current Medication: Mounjaro 12.5mg  Previously tolerated 15mg dose  She was off mediation for a few weeks due to multiple myeloma and bone marrow transplant, resumed at lower dose on 7/12/2024  Has been on steroids at times as part of her treatment, none currently, but was recently on steroids.   Medication Side effects:  none  Food Logging: None  Calorie Intake:  None  Following healthy diet/lower portions.     Food Recall:  Breakfast: Paola bread toasted with hummus or homemade falafal  Lunch: Salad with Fattoush with grilled meats  Dinner: Grilled  meats (lamb) skewers or vegetables  Rice small portion on the side and salad    Hydration: Water, some flavored water drops or lemon  No soda    Lifestyle History:  Alcohol: none  Exercise: Walks, has treadmill, needs more  Occupation: Seamstress  Sleep: not always sleeping well, sometimes sleeps great.   (Was on steroids) had bone marrow transplant in May       Wt Readings from Last 20 Encounters:   10/15/24 65.8 kg (145 lb)   07/12/24 70.8 kg (156 lb)   04/26/24 75.4 kg (166 lb 3.2 oz)   12/19/23 86.2 kg (190 lb)   12/12/23 88.3 kg (194 lb 9.6 oz)   11/22/23 92.1 kg (203 lb)   11/15/23 91.2 kg (201 lb)   11/06/23 92.1 kg (203 lb)   10/20/23 93.9 kg (207 lb)   10/18/23 93.2 kg (205 lb 6.4 oz)   09/14/23 95.3 kg (210 lb)   08/18/23 95.7 kg (211 lb)   08/16/23 95.7 kg (211 lb)   08/15/23 95.5 kg (210 lb 9.6 oz)   08/03/23 95.2 kg (209 lb 12.8 oz)   06/20/23 94.8 kg (209 lb)   04/10/23 94.8 kg (209 lb)   08/02/22 89.8 kg (198 lb)   07/08/22 89.8 kg (198 lb)   03/01/22 95.3 kg (210 lb)        Review of Systems    Past Medical History:   Diagnosis Date    Asthma     Banks's esophagus     GERD (gastroesophageal reflux disease)     Hyperlipidemia     Migraine     Nasal polyposis        Past Surgical History:   Procedure Laterality Date    FUNCTIONAL ENDOSCOPIC SINUS SURGERY  2006    [facility] - Dr Caballero    IR BIOPSY BONE MARROW  11/22/2023    UT STRTCTC CPTR ASSTD PX EXTRADURAL CRANIAL Bilateral 9/3/2019    Procedure: IMAGE GUIDED FUNCTIONAL ENDOSCOPIC SINUS SURGERY; Bilateral frontal sinusotomies;  Surgeon: Ke Meredith DO;  Location: [facility];  Service: ENT        Allergies   Allergen Reactions    Cephalexin Shortness Of Breath    Levofloxacin Hives    Statins      Myalgias    Clindamycin      Unsure of reaction     Metronidazole      Unsure of reaction    Penicillins Other (See Comments)     Unknown was hospitalized.     Sulfa Antibiotics Other (See Comments)        Objective:    /72 (BP  "Location: Left arm, Patient Position: Sitting)   Pulse 80   Temp (!) 97 °F (36.1 °C) (Tympanic)   Resp 18   Ht 5' 1\" (1.549 m)   Wt 65.8 kg (145 lb)   BMI 27.40 kg/m²     Physical Exam    LABS:    Lab Results   Component Value Date    HGBA1C 5.5 05/31/2024      Lab Results   Component Value Date    SODIUM 139 08/08/2024    K 4.3 08/08/2024     08/08/2024    CO2 25 08/08/2024    AGAP 10 08/08/2024    BUN 8 08/08/2024    CREATININE 0.44 08/08/2024    GLUC 87 08/08/2024    GLUF 94 09/15/2023    CALCIUM 9.9 08/08/2024    AST 17 08/08/2024    ALT 15 08/08/2024    ALKPHOS 69 08/08/2024    TP 6.3 08/08/2024    TBILI 0.8 08/08/2024    EGFR 111 08/08/2024 09/12/2023   Lab Results   Component Value Date    [id number] 2.110 01/31/2022    TSH 1.19 07/23/2021      "

## 2024-10-15 NOTE — TELEPHONE ENCOUNTER
PA for Mounjaro 15mg SUBMITTED     via    [x]CMM-KEY:  [id number]  []Maryjane-Case ID #    []Availity-Auth ID #  NDC #    []Faxed to plan   []Other website    []Phone call Case ID #      Office notes sent, clinical questions answered. Awaiting determination    Turnaround time for your insurance to make a decision on your Prior Authorization can take 7-21 business days.

## 2024-10-16 NOTE — TELEPHONE ENCOUNTER
PA for Mounjaro 15 mg APPROVED     Date(s) approved 10/16/24-10/16/25    Case #     Patient advised by          []Notifixioushart Message  [x]Phone call   [x]LMOM  []L/M to call office as no active Communication consent on file  []Unable to leave detailed message as VM not approved on Communication consent       Pharmacy advised by    []Fax  [x]Phone call    Approval letter scanned into Media Yes

## 2024-11-22 DIAGNOSIS — R80.9 MICROALBUMINURIA: ICD-10-CM

## 2024-11-22 RX ORDER — LISINOPRIL 2.5 MG/1
2.5 TABLET ORAL DAILY
Qty: 90 TABLET | Refills: 0 | Status: SHIPPED | OUTPATIENT
Start: 2024-11-22

## 2024-11-22 NOTE — TELEPHONE ENCOUNTER
Please call pt and let her know NO FURTHER REFILLS until she makes a f/u apt with us as she is overdue for many care gaps.

## 2025-02-17 DIAGNOSIS — E11.9 TYPE 2 DIABETES MELLITUS WITHOUT COMPLICATION, WITHOUT LONG-TERM CURRENT USE OF INSULIN (HCC): ICD-10-CM

## 2025-02-18 DIAGNOSIS — R80.9 MICROALBUMINURIA: ICD-10-CM

## 2025-02-18 RX ORDER — TIRZEPATIDE 15 MG/.5ML
INJECTION, SOLUTION SUBCUTANEOUS
Qty: 6 ML | Refills: 0 | Status: SHIPPED | OUTPATIENT
Start: 2025-02-18

## 2025-02-19 RX ORDER — LISINOPRIL 2.5 MG/1
2.5 TABLET ORAL DAILY
Qty: 90 TABLET | Refills: 0 | OUTPATIENT
Start: 2025-02-19

## 2025-02-19 NOTE — TELEPHONE ENCOUNTER
Please call patient and let them know they need to get their overdue fasting labs drawn and and then have an office visit with me to review the results before they will receive any further refills as they were also told this in November. Thank you.

## 2025-02-19 NOTE — TELEPHONE ENCOUNTER
Requested Prescriptions     Pending Prescriptions Disp Refills    lisinopril (ZESTRIL) 2.5 mg tablet [Pharmacy Med Name: LISINOPRIL 2.5 MG ORAL TABLET] 90 tablet 0     Sig: TAKE ONE TABLET BY MOUTH EVERY DAY      LOV 11/15/23 F/U Non Scheduled, Labs Completed

## 2025-02-20 DIAGNOSIS — R80.9 MICROALBUMINURIA: ICD-10-CM

## 2025-02-20 RX ORDER — LISINOPRIL 2.5 MG/1
2.5 TABLET ORAL DAILY
Qty: 90 TABLET | Refills: 0 | OUTPATIENT
Start: 2025-02-20

## 2025-02-20 NOTE — TELEPHONE ENCOUNTER
Please call patient and let them know they are overdue have an office visit with me or someone in the office before they will receive any further refills other than what was given today. Not seen in over one year  Thank you.

## 2025-02-21 DIAGNOSIS — E11.9 TYPE 2 DIABETES MELLITUS WITHOUT COMPLICATION, WITHOUT LONG-TERM CURRENT USE OF INSULIN (HCC): ICD-10-CM

## 2025-02-21 RX ORDER — TIRZEPATIDE 15 MG/.5ML
INJECTION, SOLUTION SUBCUTANEOUS
Qty: 6 ML | Refills: 0 | OUTPATIENT
Start: 2025-02-21

## 2025-02-28 DIAGNOSIS — K59.03 DRUG-INDUCED CONSTIPATION: ICD-10-CM

## 2025-02-28 RX ORDER — DOCUSATE SODIUM 100 MG/1
100 CAPSULE, LIQUID FILLED ORAL DAILY
Qty: 90 CAPSULE | Refills: 0 | Status: SHIPPED | OUTPATIENT
Start: 2025-02-28

## 2025-02-28 NOTE — TELEPHONE ENCOUNTER
Reason for call:   [x] Refill   [] Prior Auth  [] Other:     Office:   [] PCP/Provider -   [x] Specialty/Provider - Weight, Sigona    Medication:   Docusate sodium 100 mg, 1 qd, 90      Pharmacy:   [facility]    Does the patient have enough for 3 days?   [] Yes   [x] No - Send as HP to POD

## 2025-05-06 DIAGNOSIS — E11.9 TYPE 2 DIABETES MELLITUS WITHOUT COMPLICATION, WITHOUT LONG-TERM CURRENT USE OF INSULIN (HCC): ICD-10-CM

## 2025-05-08 RX ORDER — TIRZEPATIDE 15 MG/.5ML
INJECTION, SOLUTION SUBCUTANEOUS
Qty: 6 ML | Refills: 0 | Status: SHIPPED | OUTPATIENT
Start: 2025-05-08

## 2025-06-04 ENCOUNTER — TELEPHONE (OUTPATIENT)
Dept: FAMILY MEDICINE CLINIC | Facility: CLINIC | Age: 60
End: 2025-06-04

## 2025-06-10 NOTE — TELEPHONE ENCOUNTER
Patient returned call, patient scheduled OVS 7/1/2025. Please advise patient if any further questions.

## 2025-06-18 ENCOUNTER — OFFICE VISIT (OUTPATIENT)
Dept: BARIATRICS | Facility: CLINIC | Age: 60
End: 2025-06-18
Payer: COMMERCIAL

## 2025-06-18 VITALS
DIASTOLIC BLOOD PRESSURE: 84 MMHG | SYSTOLIC BLOOD PRESSURE: 121 MMHG | HEIGHT: 61 IN | WEIGHT: 118.4 LBS | TEMPERATURE: 97.3 F | BODY MASS INDEX: 22.36 KG/M2 | HEART RATE: 94 BPM | RESPIRATION RATE: 16 BRPM

## 2025-06-18 DIAGNOSIS — E78.5 HYPERLIPIDEMIA: ICD-10-CM

## 2025-06-18 DIAGNOSIS — M81.0 AGE-RELATED OSTEOPOROSIS WITHOUT CURRENT PATHOLOGICAL FRACTURE: ICD-10-CM

## 2025-06-18 DIAGNOSIS — E11.9 TYPE 2 DIABETES MELLITUS WITHOUT COMPLICATION, WITHOUT LONG-TERM CURRENT USE OF INSULIN (HCC): Primary | ICD-10-CM

## 2025-06-18 DIAGNOSIS — E66.812 OBESITY, CLASS II, BMI 35-39.9: ICD-10-CM

## 2025-06-18 PROCEDURE — 99214 OFFICE O/P EST MOD 30 MIN: CPT | Performed by: PHYSICIAN ASSISTANT

## 2025-06-18 RX ORDER — LOTILANER OPHTHALMIC SOLUTION 2.5 MG/ML
SOLUTION/ DROPS OPHTHALMIC
COMMUNITY
Start: 2025-03-29

## 2025-06-18 RX ORDER — TRAMADOL HYDROCHLORIDE 50 MG/1
TABLET ORAL
COMMUNITY
Start: 2025-06-04

## 2025-06-18 RX ORDER — TIRZEPATIDE 12.5 MG/.5ML
INJECTION, SOLUTION SUBCUTANEOUS
Qty: 2 ML | Refills: 3 | Status: SHIPPED | OUTPATIENT
Start: 2025-06-18

## 2025-06-18 RX ORDER — SODIUM CHLORIDE FOR INHALATION 0.9 %
VIAL, NEBULIZER (ML) INHALATION
COMMUNITY
Start: 2025-02-27

## 2025-06-18 RX ORDER — CYCLOSPORINE 0.5 MG/ML
EMULSION OPHTHALMIC
COMMUNITY
Start: 2025-06-12

## 2025-06-18 RX ORDER — ERGOCALCIFEROL 1.25 MG/1
CAPSULE, LIQUID FILLED ORAL
COMMUNITY
Start: 2025-03-12

## 2025-06-18 RX ORDER — LIDOCAINE, CAPSAICIN, MENTHOL, METHYL SALICYLATE .025; 4; 5; 2 G/1; G/1; G/1; G/1
1 PATCH TOPICAL DAILY PRN
COMMUNITY
Start: 2025-05-07

## 2025-06-18 NOTE — ASSESSMENT & PLAN NOTE
Encouraged her to complete DEXA ordered by her PCP  Discussed importance of increased dietary calcium intake (3-4 servings per day)  She is taking a Vitamin D supplement  Discussed importance of exercise/strength training-- referred to [facility] fitness program.  no

## 2025-06-18 NOTE — PROGRESS NOTES
Assessment & Plan  Type 2 diabetes mellitus without complication, without long-term current use of insulin (HCC)  Diabetes under good control based on glucose testing and A1C performed over 1 year ago  Ordered updated Lab testing  Continue Mounjaro-- but will reduce dose to 12.5mg weekly for now as she is no longer being treated with steroids which contributed to weight gain.   She is concerned that she will gain too much weight on lower dose-- advised her to contact office if significant change in weight between visits.   Advised her to contact office if further weight loss and if so will make further reduction to Mounjaro dosing.     Lab Results   Component Value Date    HGBA1C 5.5 05/31/2024     Age-related osteoporosis without current pathological fracture  Encouraged her to complete DEXA ordered by her PCP  Discussed importance of increased dietary calcium intake (3-4 servings per day)  She is taking a Vitamin D supplement  Discussed importance of exercise/strength training-- referred to [facility] fitness program.   Obesity, Class II, BMI 35-39.9  BMI has improved and is now normal  She will be meeting with a dietician through [place]   Goal to maintain current weight and would not recommend further weight loss at this time  Recommend the following to maintain current weight, sample meal plan/lean souces of protein into provided.   60-80g protein per day   1300 calories per day if sedentary, and increase to ~1430 calories per day if she begins to exercise 1-3x per week.          Return in about 3 months (around 9/18/2025).         Subjective:   Chief Complaint   Patient presents with   • Follow-up     MWM-6M F/u; Waist-31in       Patient ID: Amada Okeefe  is a 59 y.o. female with excess weight/obesity here for Weight Management Follow up Visit    HPI: Here for Medical Weight Management Follow Up Visit    Obesity/Excess Weight:  Current BMI: Body mass index is 22.37 kg/m².   Initial Weight: 209--  8/2/2023  (She reports in the past her weight was typically below 110 pounds, but gained weight with steroid treatment)  Last visit Weight: 145  Current Weight: 118  Weekly weights at [facility]-- Stable x 3 months.   Dupixent working better, not needing steroids any more.     Hx Multiple myeloma/bone marrow transplant in 5/2024-- now getting monthly treatments, doing well  Following at [facility]  Will be seeing dietician soon through [facility], they want her to increase protein intke.     Current Weight Management Plan:   Current Medication: Mounjaro 15mg weekly (Dx of DM2)  Medication Side effects:  None  Does feel hungry in the morning and feels appetite is OK.     Food Recall:  Breakfast: Bagel, Cream Cheese, coffee  or sometimes eggs/toast  AM Snack: Banana or protein shake  Lunch: Salad or Chicken wings  PM Snack: Protein bar  Dinner: Chicken, broccoli, rice (chinese)-- does light sauce, small portion    Lifestyle History:  Exercise: None, active within the house.   Occupation: Working at home        Wt Readings from Last 20 Encounters:   06/18/25 53.7 kg (118 lb 6.4 oz)   10/15/24 65.8 kg (145 lb)   07/12/24 70.8 kg (156 lb)   04/26/24 75.4 kg (166 lb 3.2 oz)   12/19/23 86.2 kg (190 lb)   12/12/23 88.3 kg (194 lb 9.6 oz)   11/22/23 92.1 kg (203 lb)   11/15/23 91.2 kg (201 lb)   11/06/23 92.1 kg (203 lb)   10/20/23 93.9 kg (207 lb)   10/18/23 93.2 kg (205 lb 6.4 oz)   09/14/23 95.3 kg (210 lb)   08/18/23 95.7 kg (211 lb)   08/16/23 95.7 kg (211 lb)   08/15/23 95.5 kg (210 lb 9.6 oz)   08/03/23 95.2 kg (209 lb 12.8 oz)   06/20/23 94.8 kg (209 lb)   04/10/23 94.8 kg (209 lb)   08/02/22 89.8 kg (198 lb)   07/08/22 89.8 kg (198 lb)        Review of Systems    Past Medical History[1]    Past Surgical History[2]     Allergies   Allergen Reactions   • Cephalexin Shortness Of Breath   • Levofloxacin Hives   • Statins      Myalgias   • Clindamycin      Unsure of reaction    • Metronidazole      Unsure of reaction   •  "Penicillins Other (See Comments)     Unknown was hospitalized.    • Sulfa Antibiotics Other (See Comments)        Objective:    /84   Pulse 94   Temp (!) 97.3 °F (36.3 °C)   Resp 16   Ht 5' 1\" (1.549 m)   Wt 53.7 kg (118 lb 6.4 oz)   BMI 22.37 kg/m²     Physical Exam:  Constitutional:  she  appears well-developed and well-nourished. No distress.   HENT:   Head: Normocephalic and atraumatic.   Neck: Normal range of motion.   Pulmonary/Chest: Effort normal.   Musculoskeletal: Normal range of motion.   Neurological: she  is alert and oriented to person, place, and time.   Skin: she  is not diaphoretic.   Psychiatric: she  has a normal mood and affect. her  behavior is normal.        LABS:    Lab Results   Component Value Date    HGBA1C 5.5 05/31/2024      Lab Results   Component Value Date    SODIUM 139 08/08/2024    K 4.3 08/08/2024     08/08/2024    CO2 25 08/08/2024    AGAP 10 08/08/2024    BUN 8 08/08/2024    CREATININE 0.44 08/08/2024    GLUC 87 08/08/2024    GLUF 94 09/15/2023    CALCIUM 9.9 08/08/2024    AST 17 08/08/2024    ALT 15 08/08/2024    ALKPHOS 69 08/08/2024    TP 6.3 08/08/2024    TBILI 0.8 08/08/2024    EGFR 111 08/08/2024 09/12/2023   Lab Results   Component Value Date    JIC9GFWROTPR 2.110 01/31/2022    TSH 1.19 07/23/2021             [1]  Past Medical History:  Diagnosis Date   • Asthma    • Banks's esophagus    • GERD (gastroesophageal reflux disease)    • Hyperlipidemia    • Migraine    • Nasal polyposis    [2]  Past Surgical History:  Procedure Laterality Date   • FUNCTIONAL ENDOSCOPIC SINUS SURGERY  2006    [facility] - Dr Caballero   • IR BIOPSY BONE MARROW  11/22/2023   • [place] STRTCTC CPTR ASSTD PX EXTRADURAL CRANIAL Bilateral 9/3/2019    Procedure: IMAGE GUIDED FUNCTIONAL ENDOSCOPIC SINUS SURGERY; Bilateral frontal sinusotomies;  Surgeon: Ke Meredith DO;  Location: [facility];  Service: ENT   "

## 2025-06-18 NOTE — ASSESSMENT & PLAN NOTE
Diabetes under good control based on glucose testing and A1C performed over 1 year ago  Ordered updated Lab testing  Continue Mounjaro-- but will reduce dose to 12.5mg weekly for now as she is no longer being treated with steroids which contributed to weight gain.   She is concerned that she will gain too much weight on lower dose-- advised her to contact office if significant change in weight between visits.   Advised her to contact office if further weight loss and if so will make further reduction to Mounjaro dosing.     Lab Results   Component Value Date    HGBA1C 5.5 05/31/2024

## 2025-07-14 DIAGNOSIS — R80.9 MICROALBUMINURIA: ICD-10-CM

## 2025-07-16 RX ORDER — LISINOPRIL 2.5 MG/1
2.5 TABLET ORAL DAILY
Qty: 90 TABLET | Refills: 0 | Status: SHIPPED | OUTPATIENT
Start: 2025-07-16 | End: 2025-07-29

## 2025-07-28 ENCOUNTER — OFFICE VISIT (OUTPATIENT)
Dept: FAMILY MEDICINE CLINIC | Facility: CLINIC | Age: 60
End: 2025-07-28
Payer: COMMERCIAL

## 2025-07-28 VITALS
SYSTOLIC BLOOD PRESSURE: 112 MMHG | DIASTOLIC BLOOD PRESSURE: 66 MMHG | HEART RATE: 80 BPM | OXYGEN SATURATION: 98 % | HEIGHT: 61 IN | WEIGHT: 118.2 LBS | BODY MASS INDEX: 22.31 KG/M2

## 2025-07-28 DIAGNOSIS — M81.0 AGE-RELATED OSTEOPOROSIS WITHOUT CURRENT PATHOLOGICAL FRACTURE: ICD-10-CM

## 2025-07-28 DIAGNOSIS — E55.9 VITAMIN D DEFICIENCY: ICD-10-CM

## 2025-07-28 DIAGNOSIS — G43.109 MIGRAINE WITH AURA AND WITHOUT STATUS MIGRAINOSUS, NOT INTRACTABLE: Primary | ICD-10-CM

## 2025-07-28 DIAGNOSIS — J45.50 SEVERE PERSISTENT ASTHMA WITHOUT COMPLICATION: ICD-10-CM

## 2025-07-28 DIAGNOSIS — Z12.31 ENCOUNTER FOR SCREENING MAMMOGRAM FOR BREAST CANCER: ICD-10-CM

## 2025-07-28 DIAGNOSIS — E78.2 MULTIPLE-TYPE HYPERLIPIDEMIA: ICD-10-CM

## 2025-07-28 DIAGNOSIS — K22.70 BARRETT'S ESOPHAGUS WITH ESOPHAGITIS: ICD-10-CM

## 2025-07-28 DIAGNOSIS — R80.9 MICROALBUMINURIA: ICD-10-CM

## 2025-07-28 DIAGNOSIS — C90.00 MULTIPLE MYELOMA NOT HAVING ACHIEVED REMISSION (HCC): ICD-10-CM

## 2025-07-28 DIAGNOSIS — K21.9 GASTROESOPHAGEAL REFLUX DISEASE WITHOUT ESOPHAGITIS: ICD-10-CM

## 2025-07-28 DIAGNOSIS — K59.09 OTHER CONSTIPATION: ICD-10-CM

## 2025-07-28 DIAGNOSIS — E11.9 TYPE 2 DIABETES MELLITUS WITHOUT COMPLICATION, WITHOUT LONG-TERM CURRENT USE OF INSULIN (HCC): ICD-10-CM

## 2025-07-28 DIAGNOSIS — K20.90 BARRETT'S ESOPHAGUS WITH ESOPHAGITIS: ICD-10-CM

## 2025-07-28 PROBLEM — Z76.82 STEM CELL TRANSPLANT CANDIDATE: Status: ACTIVE | Noted: 2024-04-24

## 2025-07-28 PROBLEM — D50.9 IRON DEFICIENCY ANEMIA: Status: ACTIVE | Noted: 2024-09-16

## 2025-07-28 LAB
CREAT UR-MCNC: 99.7 MG/DL
MICROALBUMIN UR-MCNC: 8.8 MG/L
MICROALBUMIN/CREAT 24H UR: 9 MG/G CREATININE (ref 0–30)
SL AMB POCT HEMOGLOBIN AIC: 5 (ref ?–6.5)

## 2025-07-28 PROCEDURE — 82570 ASSAY OF URINE CREATININE: CPT | Performed by: PHYSICIAN ASSISTANT

## 2025-07-28 PROCEDURE — 99214 OFFICE O/P EST MOD 30 MIN: CPT | Performed by: PHYSICIAN ASSISTANT

## 2025-07-28 PROCEDURE — 83036 HEMOGLOBIN GLYCOSYLATED A1C: CPT | Performed by: PHYSICIAN ASSISTANT

## 2025-07-28 PROCEDURE — 82043 UR ALBUMIN QUANTITATIVE: CPT | Performed by: PHYSICIAN ASSISTANT

## (undated) DEVICE — PATIENT TRACKER 9734887XOM NON-INVASIVE

## (undated) DEVICE — GLOVE INDICATOR PI UNDERGLOVE SZ 7.5 BLUE

## (undated) DEVICE — SHEATH 1912000 5PK 4MM/0DEG STORZ XOMED: Brand: ENDO-SCRUB®

## (undated) DEVICE — TUBING 1895522 5PK STRAIGHTSHOT TO XPS: Brand: STRAIGHTSHOT®

## (undated) DEVICE — SHEATH 1912010 5PK 4MM/30DEG STORZ XOMED: Brand: ENDO-SCRUB®

## (undated) DEVICE — GLOVE SRG BIOGEL ORTHOPEDIC 7

## (undated) DEVICE — NEURO PATTIES 1/2 X 3

## (undated) DEVICE — NEEDLE 25G X 1 1/2

## (undated) DEVICE — 2000CC GUARDIAN II: Brand: GUARDIAN

## (undated) DEVICE — STERILE NASAL PACK: Brand: CARDINAL HEALTH

## (undated) DEVICE — INSTRUMENT TRACKER 9733533XOM ENT 1PK

## (undated) DEVICE — SPECIMEN SOCK - SHORT: Brand: MEDI-VAC

## (undated) DEVICE — ASTOUND STANDARD SURGICAL GOWN, XXL: Brand: CONVERTORS

## (undated) DEVICE — BLADE 1884080EM TRICUT 4MMX13CM M4 ROHS: Brand: FUSION®

## (undated) DEVICE — GLOVE SRG BIOGEL ECLIPSE 7

## (undated) DEVICE — SPECIMEN CONTAINER STERILE PEEL PACK

## (undated) DEVICE — TUBING SUCTION 5MM X 12 FT

## (undated) DEVICE — SCD SEQUENTIAL COMPRESSION COMFORT SLEEVE MEDIUM KNEE LENGTH: Brand: KENDALL SCD

## (undated) DEVICE — SYRINGE 10ML LL CONTROL TOP